# Patient Record
Sex: MALE | Race: WHITE | NOT HISPANIC OR LATINO | Employment: OTHER
[De-identification: names, ages, dates, MRNs, and addresses within clinical notes are randomized per-mention and may not be internally consistent; named-entity substitution may affect disease eponyms.]

---

## 2018-04-09 ENCOUNTER — TRANSCRIBE ORDERS (OUTPATIENT)
Dept: SCHEDULING | Age: 63
End: 2018-04-09

## 2018-04-09 DIAGNOSIS — R06.02 SHORTNESS OF BREATH: Primary | ICD-10-CM

## 2018-04-13 ENCOUNTER — HOSPITAL ENCOUNTER (OUTPATIENT)
Dept: PULMONOLOGY | Facility: HOSPITAL | Age: 63
Discharge: HOME | End: 2018-04-13
Attending: INTERNAL MEDICINE
Payer: COMMERCIAL

## 2018-04-13 VITALS — WEIGHT: 161 LBS | BODY MASS INDEX: 24.4 KG/M2 | HEIGHT: 68 IN | OXYGEN SATURATION: 99 % | HEART RATE: 50 BPM

## 2018-04-13 PROCEDURE — 94729 DIFFUSING CAPACITY: CPT

## 2018-04-13 PROCEDURE — 94726 PLETHYSMOGRAPHY LUNG VOLUMES: CPT

## 2018-04-13 PROCEDURE — 94010 BREATHING CAPACITY TEST: CPT

## 2020-12-22 ENCOUNTER — APPOINTMENT (EMERGENCY)
Dept: RADIOLOGY | Facility: HOSPITAL | Age: 65
DRG: 177 | End: 2020-12-22
Attending: EMERGENCY MEDICINE
Payer: MEDICARE

## 2020-12-22 ENCOUNTER — HOSPITAL ENCOUNTER (EMERGENCY)
Facility: HOSPITAL | Age: 65
Discharge: HOME | DRG: 177 | End: 2020-12-22
Attending: EMERGENCY MEDICINE
Payer: MEDICARE

## 2020-12-22 VITALS
HEART RATE: 74 BPM | TEMPERATURE: 100.4 F | DIASTOLIC BLOOD PRESSURE: 74 MMHG | OXYGEN SATURATION: 97 % | RESPIRATION RATE: 18 BRPM | SYSTOLIC BLOOD PRESSURE: 131 MMHG

## 2020-12-22 DIAGNOSIS — J12.82 PNEUMONIA DUE TO COVID-19 VIRUS: Primary | ICD-10-CM

## 2020-12-22 DIAGNOSIS — U07.1 PNEUMONIA DUE TO COVID-19 VIRUS: Primary | ICD-10-CM

## 2020-12-22 LAB
ALBUMIN SERPL-MCNC: 3.9 G/DL (ref 3.4–5)
ALP SERPL-CCNC: 44 IU/L (ref 35–126)
ALT SERPL-CCNC: 24 IU/L (ref 16–63)
ANION GAP SERPL CALC-SCNC: 11 MEQ/L (ref 3–15)
APTT PPP: 32 SEC (ref 23–35)
AST SERPL-CCNC: 38 IU/L (ref 15–41)
ATRIAL RATE: 60
BASOPHILS # BLD: 0 K/UL (ref 0.01–0.1)
BASOPHILS NFR BLD: 0 %
BILIRUB SERPL-MCNC: 0.8 MG/DL (ref 0.3–1.2)
BUN SERPL-MCNC: 16 MG/DL (ref 8–20)
CALCIUM SERPL-MCNC: 8.3 MG/DL (ref 8.9–10.3)
CHLORIDE SERPL-SCNC: 97 MEQ/L (ref 98–109)
CO2 SERPL-SCNC: 24 MEQ/L (ref 22–32)
CREAT SERPL-MCNC: 0.8 MG/DL (ref 0.8–1.3)
DIFFERENTIAL METHOD BLD: ABNORMAL
EOSINOPHIL # BLD: 0 K/UL (ref 0.04–0.54)
EOSINOPHIL NFR BLD: 0 %
ERYTHROCYTE [DISTWIDTH] IN BLOOD BY AUTOMATED COUNT: 12.4 % (ref 11.6–14.4)
GFR SERPL CREATININE-BSD FRML MDRD: >60 ML/MIN/1.73M*2
GLUCOSE SERPL-MCNC: 97 MG/DL (ref 70–99)
HCT VFR BLDCO AUTO: 38.7 % (ref 40.1–51)
HGB BLD-MCNC: 13.4 G/DL (ref 13.7–17.5)
INR PPP: 1.1 INR
LYMPHOCYTES # BLD: 0.69 K/UL (ref 1.2–3.5)
LYMPHOCYTES NFR BLD: 28 %
MCH RBC QN AUTO: 30.2 PG (ref 28–33.2)
MCHC RBC AUTO-ENTMCNC: 34.6 G/DL (ref 32.2–36.5)
MCV RBC AUTO: 87.4 FL (ref 83–98)
METAMYELOCYTES # BLD MANUAL: 0.02 K/UL
METAMYELOCYTES NFR BLD MANUAL: 1 %
MONOCYTES # BLD: 0.29 K/UL (ref 0.3–1)
MONOCYTES NFR BLD: 12 %
NEUTS BAND # BLD: 0.05 K/UL (ref 0–0.53)
NEUTS BAND # BLD: 1.4 K/UL (ref 1.7–7)
NEUTS BAND NFR BLD: 2 %
NEUTS SEG NFR BLD: 57 %
P AXIS: 10
PDW BLD AUTO: 11.4 FL (ref 9.4–12.4)
PLAT MORPH BLD: NORMAL
PLATELET # BLD AUTO: 104 K/UL (ref 150–350)
PLATELET # BLD EST: ABNORMAL 10*3/UL
PLATELET CLUMP BLD QL SMEAR: PRESENT
POTASSIUM SERPL-SCNC: 3.7 MEQ/L (ref 3.6–5.1)
PR INTERVAL: 172
PROT SERPL-MCNC: 7.7 G/DL (ref 6–8.2)
PROTHROMBIN TIME: 14.3 SEC (ref 12.2–14.5)
QRS DURATION: 102
QT INTERVAL: 382
QTC CALCULATION(BAZETT): 382
R AXIS: 30
RBC # BLD AUTO: 4.43 M/UL (ref 4.5–5.8)
RBC MORPH BLD: NORMAL
SODIUM SERPL-SCNC: 132 MEQ/L (ref 136–144)
T WAVE AXIS: 28
TROPONIN I SERPL-MCNC: <0.02 NG/ML
VENTRICULAR RATE: 60
WBC # BLD AUTO: 2.45 K/UL (ref 3.8–10.5)

## 2020-12-22 PROCEDURE — 25800000 HC PHARMACY IV SOLUTIONS: Performed by: PHYSICIAN ASSISTANT

## 2020-12-22 PROCEDURE — 80053 COMPREHEN METABOLIC PANEL: CPT | Performed by: PHYSICIAN ASSISTANT

## 2020-12-22 PROCEDURE — 36415 COLL VENOUS BLD VENIPUNCTURE: CPT | Performed by: PHYSICIAN ASSISTANT

## 2020-12-22 PROCEDURE — 93005 ELECTROCARDIOGRAM TRACING: CPT | Performed by: PHYSICIAN ASSISTANT

## 2020-12-22 PROCEDURE — 85730 THROMBOPLASTIN TIME PARTIAL: CPT | Performed by: PHYSICIAN ASSISTANT

## 2020-12-22 PROCEDURE — 84484 ASSAY OF TROPONIN QUANT: CPT | Performed by: PHYSICIAN ASSISTANT

## 2020-12-22 PROCEDURE — 99284 EMERGENCY DEPT VISIT MOD MDM: CPT | Mod: 59

## 2020-12-22 PROCEDURE — 71260 CT THORAX DX C+: CPT | Mod: MG

## 2020-12-22 PROCEDURE — 85610 PROTHROMBIN TIME: CPT | Performed by: PHYSICIAN ASSISTANT

## 2020-12-22 PROCEDURE — 63700000 HC SELF-ADMINISTRABLE DRUG: Performed by: PHYSICIAN ASSISTANT

## 2020-12-22 PROCEDURE — 71045 X-RAY EXAM CHEST 1 VIEW: CPT

## 2020-12-22 PROCEDURE — 85025 COMPLETE CBC W/AUTO DIFF WBC: CPT | Performed by: PHYSICIAN ASSISTANT

## 2020-12-22 PROCEDURE — 96361 HYDRATE IV INFUSION ADD-ON: CPT | Mod: 59

## 2020-12-22 PROCEDURE — 63600105 HC IODINE BASED CONTRAST: Performed by: PHYSICIAN ASSISTANT

## 2020-12-22 PROCEDURE — 96360 HYDRATION IV INFUSION INIT: CPT

## 2020-12-22 RX ORDER — AZITHROMYCIN 250 MG/1
TABLET, FILM COATED ORAL
Qty: 6 TABLET | Refills: 0 | Status: SHIPPED | OUTPATIENT
Start: 2020-12-22 | End: 2021-01-12 | Stop reason: HOSPADM

## 2020-12-22 RX ORDER — SODIUM CHLORIDE 9 MG/ML
100 INJECTION, SOLUTION INTRAVENOUS CONTINUOUS
Status: DISCONTINUED | OUTPATIENT
Start: 2020-12-22 | End: 2020-12-22 | Stop reason: HOSPADM

## 2020-12-22 RX ORDER — ACETAMINOPHEN 325 MG/1
650 TABLET ORAL ONCE
Status: COMPLETED | OUTPATIENT
Start: 2020-12-22 | End: 2020-12-22

## 2020-12-22 RX ADMIN — IOHEXOL 100 ML: 350 INJECTION, SOLUTION INTRAVENOUS at 14:31

## 2020-12-22 RX ADMIN — SODIUM CHLORIDE 100 ML/HR: 9 INJECTION, SOLUTION INTRAVENOUS at 11:25

## 2020-12-22 RX ADMIN — ACETAMINOPHEN 650 MG: 325 TABLET ORAL at 13:21

## 2020-12-22 ASSESSMENT — ENCOUNTER SYMPTOMS
FATIGUE: 0
JOINT SWELLING: 0
ABDOMINAL PAIN: 0
DIZZINESS: 0
COUGH: 1
TREMORS: 0
VOMITING: 0
CHILLS: 0
EYE PAIN: 0
DIARRHEA: 0
EYE REDNESS: 0
WHEEZING: 0
CONSTIPATION: 0
PHOTOPHOBIA: 0
SORE THROAT: 0
CHEST TIGHTNESS: 0
VOICE CHANGE: 0
ABDOMINAL DISTENTION: 0
NAUSEA: 1
MYALGIAS: 0
CONFUSION: 0
SINUS PAIN: 0
NECK STIFFNESS: 0
APPETITE CHANGE: 0
BLOOD IN STOOL: 0
BRUISES/BLEEDS EASILY: 0
STRIDOR: 0
NECK PAIN: 0
NUMBNESS: 0
BACK PAIN: 0
SHORTNESS OF BREATH: 0
LIGHT-HEADEDNESS: 0
COLOR CHANGE: 0
RHINORRHEA: 0
PALPITATIONS: 0
DECREASED CONCENTRATION: 0
WEAKNESS: 0
HEADACHES: 0
DIAPHORESIS: 0
ARTHRALGIAS: 0
FEVER: 1
TROUBLE SWALLOWING: 0
SINUS PRESSURE: 0

## 2020-12-22 NOTE — DISCHARGE INSTRUCTIONS
Zithromax as prescribed   Increase fluids, rest  OTC tylenol /motrin as needed   Follow up with PCP in 1-2 days   Return to the ED for worsening of symptoms or any problems or concerns.  It is very important to follow up with your healthcare provider for re-evaluation.

## 2020-12-22 NOTE — ED PROVIDER NOTES
"HPI     Chief Complaint   Patient presents with   • COVID Related       Patient is a 66 y/o male with PMH HLD who presents c/o coughing blood and L lung pain x 1 day  Patient states he was dx with COVID 8 days ago  Patient states he has had dry cough, nausea, fever   Patient states this morning he noticed increased pain \"in my left lung\"   Notes pain was worse with coughing and blowing his nose   Patient states \"I also coughed up a few streaks of blood\" which prompted his arrival  Patient states \"I am worried I have pneumonia\"   Patient denies vomiting, diarrhea, SOB, DOMINGUEZ  Patient denies any other complaints or concerns at this time            Patient History     Past Medical History:   Diagnosis Date   • Lipid disorder        History reviewed. No pertinent surgical history.    History reviewed. No pertinent family history.    Social History     Tobacco Use   • Smoking status: Former Smoker   • Smokeless tobacco: Never Used   Substance Use Topics   • Alcohol use: Yes   • Drug use: Not on file       Systems Reviewed from Nursing Triage:          Review of Systems     Review of Systems   Constitutional: Positive for fever. Negative for appetite change, chills, diaphoresis and fatigue.   HENT: Negative for congestion, ear pain, postnasal drip, rhinorrhea, sinus pressure, sinus pain, sore throat, tinnitus, trouble swallowing and voice change.    Eyes: Negative for photophobia, pain, redness and visual disturbance.   Respiratory: Positive for cough. Negative for chest tightness, shortness of breath, wheezing and stridor.    Cardiovascular: Positive for chest pain. Negative for palpitations and leg swelling.   Gastrointestinal: Positive for nausea. Negative for abdominal distention, abdominal pain, blood in stool, constipation, diarrhea and vomiting.   Musculoskeletal: Negative for arthralgias, back pain, gait problem, joint swelling, myalgias, neck pain and neck stiffness.   Skin: Negative for color change and rash. "   Neurological: Negative for dizziness, tremors, syncope, weakness, light-headedness, numbness and headaches.   Hematological: Does not bruise/bleed easily.   Psychiatric/Behavioral: Negative for confusion and decreased concentration.        Physical Exam     ED Vitals    Date/Time Temp Pulse Resp BP SpO2 [facility]   12/22/20 1531 -- 74 20 -- 95 % PR[facility]   12/22/20 1322 38 °C (100.4 °F) 71 20 131/74 97 % PR[facility]   12/22/20 0946 37.9 °C (100.3 °F) 82 18 121/63 98 % MP          Pulse Ox %: 98 % (12/22/20 1147)  Pulse Ox Interpretation: Normal (12/22/20 1147)  Heart Rate: 60 (12/22/20 1147)  Rhythm Strip Interpretation: Normal Sinus Rhythm (12/22/20 1147)                                       Physical Exam  Vitals signs and nursing note reviewed.   Constitutional:       General: He is not in acute distress.     Appearance: Normal appearance. He is normal weight. He is not ill-appearing, toxic-appearing or diaphoretic.   HENT:      Head: Normocephalic and atraumatic.      Nose: Nose normal.      Mouth/Throat:      Mouth: Mucous membranes are moist.      Pharynx: Oropharynx is clear.   Eyes:      Extraocular Movements: Extraocular movements intact.      Conjunctiva/sclera: Conjunctivae normal.      Pupils: Pupils are equal, round, and reactive to light.   Neck:      Musculoskeletal: Normal range of motion. No neck rigidity or muscular tenderness.   Cardiovascular:      Rate and Rhythm: Normal rate and regular rhythm.      Pulses: Normal pulses.           Radial pulses are 2+ on the right side and 2+ on the left side.        Posterior tibial pulses are 2+ on the right side and 2+ on the left side.      Heart sounds: Normal heart sounds.   Pulmonary:      Effort: Pulmonary effort is normal. No respiratory distress.      Breath sounds: Normal breath sounds. No stridor. No wheezing, rhonchi or rales.   Chest:      Chest wall: No tenderness.   Abdominal:      General: Abdomen is flat. Bowel sounds are normal. There is no distension.       Tenderness: There is no abdominal tenderness. There is no guarding.   Musculoskeletal: Normal range of motion.      Right lower leg: No edema.      Left lower leg: No edema.   Lymphadenopathy:      Cervical: No cervical adenopathy.   Skin:     General: Skin is warm and dry.      Capillary Refill: Capillary refill takes less than 2 seconds.      Coloration: Skin is not pale.   Neurological:      General: No focal deficit present.      Mental Status: He is alert and oriented to person, place, and time.      Cranial Nerves: No cranial nerve deficit.      Motor: No weakness.      Gait: Gait normal.   Psychiatric:         Mood and Affect: Mood normal.         Thought Content: Thought content normal.         Judgment: Judgment normal.              Procedures    Results     Procedure Component Value Units Date/Time    CBC and differential [[id number]]  (Abnormal) Collected: 12/22/20 1124    Specimen: Blood, Venous Updated: 12/22/20 1209     WBC 2.45 K/uL      Comment: ALL RESULTS HAVE BEEN CHECKED. This result has been called to BAILEY MICHAEL by Thania Strauss on 12 22 20 at 11:47, and has been read back.         RBC 4.43 M/uL      Hemoglobin 13.4 g/dL      Hematocrit 38.7 %      MCV 87.4 fL      MCH 30.2 pg      MCHC 34.6 g/dL      RDW 12.4 %      Platelets 104 K/uL      Comment: RESULTS CHECKED. SPECIMEN QUALITY CHECKED        MPV 11.4 fL      Differential Type Manu     Neutrophils 57 %      Lymphocytes 28 %      Monocytes 12 %      Eosinophils 0 %      Basophils 0 %      Bands 2 %      Metamyelocytes 1 %      Neutrophils, Absolute 1.40 K/uL      Lymphocytes, Absolute 0.69 K/uL      Monocytes, Absolute 0.29 K/uL      Eosinophils, Absolute 0.00 K/uL      Basophils, Absolute 0.00 K/uL      Bands, Absolute 0.05 K/uL      Metamyelocytes, Absolute 0.02 K/uL      RBC Morphology Normal     PLT Morphology Normal     Platelet Estimate Decreased (51,000-149,000)     Clumped Platelets Present    Troponin I [[id number]]  (Normal)  Collected: 12/22/20 1124    Specimen: Blood, Venous Updated: 12/22/20 1209     Troponin I <0.02 ng/mL     Protime-INR [[id number]]  (Normal) Collected: 12/22/20 1124    Specimen: Blood, Venous Updated: 12/22/20 1208     PT 14.3 sec      INR 1.1 INR      Comment: INR has no defined significance when PT is within Reference Range.       APTT [[id number]]  (Normal) Collected: 12/22/20 1124    Specimen: Blood, Venous Updated: 12/22/20 1208     PTT 32 sec     Comprehensive metabolic panel [[id number]]  (Abnormal) Collected: 12/22/20 1124    Specimen: Blood, Venous Updated: 12/22/20 1204     Sodium 132 mEQ/L      Potassium 3.7 mEQ/L      Comment: Results obtained on plasma. Plasma Potassium values may be up to 0.4 mEQ/L less than serum values. The differences may be greater for patients with high platelet or white cell counts.        Chloride 97 mEQ/L      CO2 24 mEQ/L      BUN 16 mg/dL      Creatinine 0.8 mg/dL      Glucose 97 mg/dL      Calcium 8.3 mg/dL      AST (SGOT) 38 IU/L      ALT (SGPT) 24 IU/L      Alkaline Phosphatase 44 IU/L      Total Protein 7.7 g/dL      Comment: Test performed on plasma which typically contains approximately 0.4 g/dL more protein than serum.        Albumin 3.9 g/dL      Bilirubin, Total 0.8 mg/dL      eGFR >60.0 mL/min/1.73m*2      Anion Gap 11 mEQ/L           Imaging Results          CT CHEST PULMONARY EMBOLISM WITH IV CONTRAST (Final result)  Result time 12/22/20 15:13:40    Final result                 Impression:    IMPRESSION:    1.  No CT evidence of pulmonary embolus.  2.  Findings in the chest consistent with known history of Covid pneumonia.             Narrative:    CLINICAL HISTORY: Shortness of breath  COVID +, coughed blood, pain in L lung with cough   .    TECHNIQUE: CT chest with contrast.  Pulmonary embolus protocol.  100 mL  Omnipaque 350.    CT DOSE:  One or more dose reduction techniques (e.g. automated exposure  control, adjustment of the mA and/or kV according to patient  size, use of  iterative reconstruction technique) utilized for this examination.    COMPARISON: None.    COMMENT:    ---------------------------------------------------------------------------  Airway/Lungs:    Patency of the trachea and central airways.  There are groundglass opacities  noted predominantly in the left upper lobe peripherally and left lower lobe at  the costophrenic angle.  This is noted to a lesser degree in the right middle  lobe and right lower lobe.  Findings are consistent with known Covid pneumonia.  No pleural effusions on either side.    ---------------------------------------------------------------------------  Mediastinum:    No CT evidence of pulmonary embolus.    Heart size is normal.  Coronary artery calcifications.  Aorta and great vessels  are unremarkable.  Prominent reactive mediastinal lymph nodes are present.    ---------------------------------------------------------------------------  Neck, Chest wall, and Osseous structures:    Lower neck is unremarkable.  Chest wall soft tissues are unremarkable.  Osseous  structures are intact.    --------------------------------------------------------------------------  Upper abdomen:    Upper abdominal soft tissues are unremarkable.    ---------------------------------------------------------------------------                                 X-RAY CHEST 1 VIEW (Final result)  Result time 12/22/20 11:59:41    Final result                 Impression:    IMPRESSION:  Patchy bilateral space opacity predominantly in the lower lobes.                 Narrative:      CLINICAL HISTORY:  Hemoptysis.  COVID-19    COMMENT:    Views: Single frontal portable..    Comparison date: none.    The heart and mediastinal contours are intact with top normal heart size..  The  trachea is midline.  Patchy airspace opacity noted predominantly in the lower  lobes..  There  are no pleural effusions.  The osseous structures are intact  with degenerative  change.                                    ECG 12 lead                     ED Course & MDM     MDM         ED Course as of Dec 22 1537   Tue Dec 22, 2020   1523 Labs and CT reviewed     [CL]   1537 Ambulatory pulse ox 95% on RA    [CL]   1537 Patient given strict return precautions. Advised close f/u with PCP. Patient expressed understanding and agreement with tx plan. All questions answered. Patient ambulating independently, tolerating PO, pain improved. VS stable.     [CL]      ED Course User Index  [CL] Teri Saravia PA C         Clinical Impressions as of Dec 22 1537   Pneumonia due to COVID-19 virus        Teri Saravia PA C  12/22/20 1537

## 2020-12-22 NOTE — ED ATTESTATION NOTE
The patient was evaluated and managed by the physician assistant / nurse practitioner.     Yifan Conner MD  12/22/20 2017

## 2020-12-24 ENCOUNTER — APPOINTMENT (EMERGENCY)
Dept: RADIOLOGY | Facility: HOSPITAL | Age: 65
DRG: 177 | End: 2020-12-24
Attending: EMERGENCY MEDICINE
Payer: MEDICARE

## 2020-12-24 ENCOUNTER — HOSPITAL ENCOUNTER (INPATIENT)
Facility: HOSPITAL | Age: 65
LOS: 19 days | Discharge: HOME HEALTH | DRG: 177 | End: 2021-01-12
Attending: EMERGENCY MEDICINE | Admitting: HOSPITALIST
Payer: MEDICARE

## 2020-12-24 DIAGNOSIS — U07.1 PNEUMONIA DUE TO COVID-19 VIRUS: ICD-10-CM

## 2020-12-24 DIAGNOSIS — R09.02 HYPOXIA: ICD-10-CM

## 2020-12-24 DIAGNOSIS — J12.82 PNEUMONIA DUE TO COVID-19 VIRUS: ICD-10-CM

## 2020-12-24 DIAGNOSIS — R06.02 SHORTNESS OF BREATH: ICD-10-CM

## 2020-12-24 DIAGNOSIS — I26.94 MULTIPLE SUBSEGMENTAL PULMONARY EMBOLI WITHOUT ACUTE COR PULMONALE (CMS/HCC): Primary | ICD-10-CM

## 2020-12-24 PROBLEM — D61.818 PANCYTOPENIA (CMS/HCC): Status: ACTIVE | Noted: 2020-12-24

## 2020-12-24 PROBLEM — E87.1 ACUTE HYPONATREMIA: Status: ACTIVE | Noted: 2020-12-24

## 2020-12-24 PROBLEM — I25.10 CORONARY ARTERY DISEASE INVOLVING NATIVE CORONARY ARTERY OF NATIVE HEART WITHOUT ANGINA PECTORIS: Chronic | Status: ACTIVE | Noted: 2020-12-24

## 2020-12-24 LAB
ALBUMIN SERPL-MCNC: 3.5 G/DL (ref 3.4–5)
ALP SERPL-CCNC: 40 IU/L (ref 35–126)
ALT SERPL-CCNC: 53 IU/L (ref 16–63)
ANION GAP SERPL CALC-SCNC: 11 MEQ/L (ref 3–15)
AST SERPL-CCNC: 85 IU/L (ref 15–41)
BACTERIA URNS QL MICRO: ABNORMAL /HPF
BASOPHILS # BLD: 0 K/UL (ref 0.01–0.1)
BASOPHILS NFR BLD: 0 %
BILIRUB SERPL-MCNC: 1 MG/DL (ref 0.3–1.2)
BILIRUB UR QL STRIP.AUTO: NEGATIVE MG/DL
BUN SERPL-MCNC: 21 MG/DL (ref 8–20)
CALCIUM SERPL-MCNC: 8.3 MG/DL (ref 8.9–10.3)
CHLORIDE SERPL-SCNC: 91 MEQ/L (ref 98–109)
CLARITY UR REFRACT.AUTO: CLEAR
CO2 SERPL-SCNC: 24 MEQ/L (ref 22–32)
COLOR UR AUTO: YELLOW
CREAT SERPL-MCNC: 0.9 MG/DL (ref 0.8–1.3)
DIFFERENTIAL METHOD BLD: ABNORMAL
EOSINOPHIL # BLD: 0 K/UL (ref 0.04–0.54)
EOSINOPHIL NFR BLD: 0 %
ERYTHROCYTE [DISTWIDTH] IN BLOOD BY AUTOMATED COUNT: 12.5 % (ref 11.6–14.4)
GFR SERPL CREATININE-BSD FRML MDRD: >60 ML/MIN/1.73M*2
GLUCOSE SERPL-MCNC: 117 MG/DL (ref 70–99)
GLUCOSE UR STRIP.AUTO-MCNC: NEGATIVE MG/DL
HCT VFR BLDCO AUTO: 34.6 % (ref 40.1–51)
HGB BLD-MCNC: 12.2 G/DL (ref 13.7–17.5)
HGB UR QL STRIP.AUTO: NEGATIVE
HYALINE CASTS #/AREA URNS LPF: ABNORMAL /LPF
KETONES UR STRIP.AUTO-MCNC: 1 MG/DL
LACTATE SERPL-SCNC: 1.2 MMOL/L (ref 0.4–2)
LEUKOCYTE ESTERASE UR QL STRIP.AUTO: NEGATIVE
LYMPHOCYTES # BLD: 0.53 K/UL (ref 1.2–3.5)
LYMPHOCYTES NFR BLD: 20 %
MCH RBC QN AUTO: 30.6 PG (ref 28–33.2)
MCHC RBC AUTO-ENTMCNC: 35.3 G/DL (ref 32.2–36.5)
MCV RBC AUTO: 86.7 FL (ref 83–98)
MONOCYTES # BLD: 0.27 K/UL (ref 0.3–1)
MONOCYTES NFR BLD: 10 %
NEUTS BAND # BLD: 0.05 K/UL (ref 0–0.53)
NEUTS BAND # BLD: 1.78 K/UL (ref 1.7–7)
NEUTS BAND NFR BLD: 2 %
NEUTS SEG NFR BLD: 67 %
NITRITE UR QL STRIP.AUTO: NEGATIVE
PDW BLD AUTO: 11 FL (ref 9.4–12.4)
PH UR STRIP.AUTO: 6.5 [PH]
PLAT MORPH BLD: NORMAL
PLATELET # BLD AUTO: 121 K/UL (ref 150–350)
PLATELET # BLD EST: ABNORMAL 10*3/UL
POTASSIUM SERPL-SCNC: 3.5 MEQ/L (ref 3.6–5.1)
PROT SERPL-MCNC: 7.2 G/DL (ref 6–8.2)
PROT UR QL STRIP.AUTO: 2
RBC # BLD AUTO: 3.99 M/UL (ref 4.5–5.8)
RBC #/AREA URNS HPF: ABNORMAL /HPF
RBC MORPH BLD: NORMAL
SODIUM SERPL-SCNC: 126 MEQ/L (ref 136–144)
SP GR UR REFRACT.AUTO: 1.02
SQUAMOUS URNS QL MICRO: 1 /HPF
TROPONIN I SERPL-MCNC: 0.02 NG/ML
UROBILINOGEN UR STRIP-ACNC: 1 EU/DL
VARIANT LYMPHS # BLD MANUAL: 0.03 K/UL
VARIANT LYMPHS NFR BLD: 1 %
WBC # BLD AUTO: 2.65 K/UL (ref 3.8–10.5)
WBC #/AREA URNS HPF: ABNORMAL /HPF

## 2020-12-24 PROCEDURE — 84484 ASSAY OF TROPONIN QUANT: CPT | Performed by: EMERGENCY MEDICINE

## 2020-12-24 PROCEDURE — 81003 URINALYSIS AUTO W/O SCOPE: CPT | Performed by: EMERGENCY MEDICINE

## 2020-12-24 PROCEDURE — 63700000 HC SELF-ADMINISTRABLE DRUG: Performed by: EMERGENCY MEDICINE

## 2020-12-24 PROCEDURE — 87040 BLOOD CULTURE FOR BACTERIA: CPT | Performed by: EMERGENCY MEDICINE

## 2020-12-24 PROCEDURE — 85025 COMPLETE CBC W/AUTO DIFF WBC: CPT | Performed by: EMERGENCY MEDICINE

## 2020-12-24 PROCEDURE — 99223 1ST HOSP IP/OBS HIGH 75: CPT | Mod: CR | Performed by: HOSPITALIST

## 2020-12-24 PROCEDURE — 93005 ELECTROCARDIOGRAM TRACING: CPT | Performed by: EMERGENCY MEDICINE

## 2020-12-24 PROCEDURE — 63600000 HC DRUGS/DETAIL CODE: Performed by: EMERGENCY MEDICINE

## 2020-12-24 PROCEDURE — 99284 EMERGENCY DEPT VISIT MOD MDM: CPT | Mod: 25

## 2020-12-24 PROCEDURE — 80053 COMPREHEN METABOLIC PANEL: CPT | Performed by: EMERGENCY MEDICINE

## 2020-12-24 PROCEDURE — 71045 X-RAY EXAM CHEST 1 VIEW: CPT

## 2020-12-24 PROCEDURE — 12000000 HC ROOM AND CARE MED/SURG

## 2020-12-24 PROCEDURE — 36415 COLL VENOUS BLD VENIPUNCTURE: CPT | Performed by: EMERGENCY MEDICINE

## 2020-12-24 PROCEDURE — 1123F ACP DISCUSS/DSCN MKR DOCD: CPT | Mod: CR | Performed by: HOSPITALIST

## 2020-12-24 PROCEDURE — 83605 ASSAY OF LACTIC ACID: CPT | Performed by: EMERGENCY MEDICINE

## 2020-12-24 RX ORDER — IBUPROFEN 200 MG
16-32 TABLET ORAL AS NEEDED
Status: DISCONTINUED | OUTPATIENT
Start: 2020-12-24 | End: 2021-01-12 | Stop reason: HOSPADM

## 2020-12-24 RX ORDER — DEXTROSE 50 % IN WATER (D50W) INTRAVENOUS SYRINGE
25 AS NEEDED
Status: DISCONTINUED | OUTPATIENT
Start: 2020-12-24 | End: 2021-01-12 | Stop reason: HOSPADM

## 2020-12-24 RX ORDER — ACETAMINOPHEN 325 MG/1
650 TABLET ORAL ONCE
Status: COMPLETED | OUTPATIENT
Start: 2020-12-24 | End: 2020-12-24

## 2020-12-24 RX ORDER — ALBUTEROL SULFATE 90 UG/1
2 INHALANT RESPIRATORY (INHALATION) EVERY 6 HOURS PRN
Status: DISCONTINUED | OUTPATIENT
Start: 2020-12-24 | End: 2021-01-12 | Stop reason: HOSPADM

## 2020-12-24 RX ORDER — SODIUM CHLORIDE 9 MG/ML
INJECTION, SOLUTION INTRAVENOUS CONTINUOUS
Status: DISCONTINUED | OUTPATIENT
Start: 2020-12-25 | End: 2020-12-25

## 2020-12-24 RX ORDER — DEXAMETHASONE SODIUM PHOSPHATE 4 MG/ML
6 INJECTION, SOLUTION INTRA-ARTICULAR; INTRALESIONAL; INTRAMUSCULAR; INTRAVENOUS; SOFT TISSUE ONCE
Status: COMPLETED | OUTPATIENT
Start: 2020-12-24 | End: 2020-12-24

## 2020-12-24 RX ORDER — DEXTROSE 40 %
15-30 GEL (GRAM) ORAL AS NEEDED
Status: DISCONTINUED | OUTPATIENT
Start: 2020-12-24 | End: 2021-01-12 | Stop reason: HOSPADM

## 2020-12-24 RX ADMIN — DEXAMETHASONE SODIUM PHOSPHATE 6 MG: 4 INJECTION, SOLUTION INTRA-ARTICULAR; INTRALESIONAL; INTRAMUSCULAR; INTRAVENOUS; SOFT TISSUE at 20:51

## 2020-12-24 RX ADMIN — ACETAMINOPHEN 650 MG: 325 TABLET ORAL at 20:50

## 2020-12-24 ASSESSMENT — ENCOUNTER SYMPTOMS
EYE PAIN: 0
WHEEZING: 0
VOMITING: 0
CLAUDICATION: 0
BACK PAIN: 0
SEIZURES: 0
ABDOMINAL PAIN: 0
COLOR CHANGE: 0
DIAPHORESIS: 0
DIARRHEA: 1
FREQUENCY: 0
ARTHRALGIAS: 0
MYALGIAS: 1
SORE THROAT: 0
PALPITATIONS: 0
COUGH: 1
SPUTUM PRODUCTION: 0
CHILLS: 1
HEMATURIA: 0
FEVER: 1
SYNCOPE: 0
NAUSEA: 0
SHORTNESS OF BREATH: 1
DYSURIA: 0

## 2020-12-25 PROBLEM — J96.01 ACUTE RESPIRATORY FAILURE WITH HYPOXIA (CMS/HCC): Status: ACTIVE | Noted: 2020-12-25

## 2020-12-25 LAB
ALBUMIN SERPL-MCNC: 3 G/DL (ref 3.4–5)
ALP SERPL-CCNC: 36 IU/L (ref 35–126)
ALT SERPL-CCNC: 50 IU/L (ref 16–63)
ANION GAP SERPL CALC-SCNC: 10 MEQ/L (ref 3–15)
ANION GAP SERPL CALC-SCNC: 11 MEQ/L (ref 3–15)
ANION GAP SERPL CALC-SCNC: 11 MEQ/L (ref 3–15)
APTT PPP: 37 SEC (ref 23–35)
AST SERPL-CCNC: 80 IU/L (ref 15–41)
ATRIAL RATE: 76
BASOPHILS # BLD: 0 K/UL (ref 0.01–0.1)
BASOPHILS NFR BLD: 0 %
BILIRUB SERPL-MCNC: 1.1 MG/DL (ref 0.3–1.2)
BUN SERPL-MCNC: 17 MG/DL (ref 8–20)
CALCIUM SERPL-MCNC: 8 MG/DL (ref 8.9–10.3)
CALCIUM SERPL-MCNC: 8 MG/DL (ref 8.9–10.3)
CALCIUM SERPL-MCNC: 8.1 MG/DL (ref 8.9–10.3)
CHLORIDE SERPL-SCNC: 93 MEQ/L (ref 98–109)
CHLORIDE SERPL-SCNC: 98 MEQ/L (ref 98–109)
CHLORIDE SERPL-SCNC: 98 MEQ/L (ref 98–109)
CK SERPL-CCNC: 326 U/L (ref 16–300)
CO2 SERPL-SCNC: 21 MEQ/L (ref 22–32)
CO2 SERPL-SCNC: 21 MEQ/L (ref 22–32)
CO2 SERPL-SCNC: 23 MEQ/L (ref 22–32)
CREAT SERPL-MCNC: 0.7 MG/DL (ref 0.8–1.3)
CREAT SERPL-MCNC: 0.7 MG/DL (ref 0.8–1.3)
CREAT SERPL-MCNC: 0.8 MG/DL (ref 0.8–1.3)
CRP SERPL-MCNC: 103.9 MG/L
D DIMER PPP IA.FEU-MCNC: 2.06 UG/ML FEU (ref 0–0.5)
DIFFERENTIAL METHOD BLD: ABNORMAL
EOSINOPHIL # BLD: 0 K/UL (ref 0.04–0.54)
EOSINOPHIL NFR BLD: 0 %
ERYTHROCYTE [DISTWIDTH] IN BLOOD BY AUTOMATED COUNT: 12.5 % (ref 11.6–14.4)
FERRITIN SERPL-MCNC: 2323 NG/ML (ref 24–250)
GFR SERPL CREATININE-BSD FRML MDRD: >60 ML/MIN/1.73M*2
GIANT PLATELETS BLD QL SMEAR: ABNORMAL
GLUCOSE SERPL-MCNC: 122 MG/DL (ref 70–99)
GLUCOSE SERPL-MCNC: 126 MG/DL (ref 70–99)
GLUCOSE SERPL-MCNC: 126 MG/DL (ref 70–99)
HCT VFR BLDCO AUTO: 32.9 % (ref 40.1–51)
HGB BLD-MCNC: 11.6 G/DL (ref 13.7–17.5)
INR PPP: 1.1 INR
LDH SERPL L TO P-CCNC: 414 IU/L (ref 98–192)
LYMPHOCYTES # BLD: 0.43 K/UL (ref 1.2–3.5)
LYMPHOCYTES NFR BLD: 30 %
MAGNESIUM SERPL-MCNC: 2 MG/DL (ref 1.8–2.5)
MCH RBC QN AUTO: 30.4 PG (ref 28–33.2)
MCHC RBC AUTO-ENTMCNC: 35.3 G/DL (ref 32.2–36.5)
MCV RBC AUTO: 86.1 FL (ref 83–98)
MONOCYTES # BLD: 0.09 K/UL (ref 0.3–1)
MONOCYTES NFR BLD: 6 %
NEUTS BAND # BLD: 0.09 K/UL (ref 0–0.53)
NEUTS BAND # BLD: 0.79 K/UL (ref 1.7–7)
NEUTS BAND NFR BLD: 6 %
NEUTS SEG NFR BLD: 55 %
OVALOCYTES BLD QL SMEAR: ABNORMAL
P AXIS: -19
PATH REV BLD -IMP: NORMAL
PDW BLD AUTO: 12.1 FL (ref 9.4–12.4)
PHOSPHATE SERPL-MCNC: 2.6 MG/DL (ref 2.4–4.7)
PLATELET # BLD AUTO: 88 K/UL (ref 150–350)
PLATELET # BLD EST: ABNORMAL 10*3/UL
PLATELET CLUMP BLD QL SMEAR: PRESENT
POTASSIUM SERPL-SCNC: 3.4 MEQ/L (ref 3.6–5.1)
POTASSIUM SERPL-SCNC: 3.6 MEQ/L (ref 3.6–5.1)
POTASSIUM SERPL-SCNC: 3.6 MEQ/L (ref 3.6–5.1)
PR INTERVAL: 152
PROT SERPL-MCNC: 6.1 G/DL (ref 6–8.2)
PROTHROMBIN TIME: 14 SEC (ref 12.2–14.5)
QRS DURATION: 90
QT INTERVAL: 374
QTC CALCULATION(BAZETT): 420
R AXIS: 46
RBC # BLD AUTO: 3.82 M/UL (ref 4.5–5.8)
SODIUM SERPL-SCNC: 126 MEQ/L (ref 136–144)
SODIUM SERPL-SCNC: 130 MEQ/L (ref 136–144)
SODIUM SERPL-SCNC: 130 MEQ/L (ref 136–144)
T WAVE AXIS: 8
VARIANT LYMPHS # BLD MANUAL: 0.04 K/UL
VARIANT LYMPHS NFR BLD: 3 %
VENTRICULAR RATE: 76
WBC # BLD AUTO: 1.44 K/UL (ref 3.8–10.5)

## 2020-12-25 PROCEDURE — 80048 BASIC METABOLIC PNL TOTAL CA: CPT | Performed by: HOSPITALIST

## 2020-12-25 PROCEDURE — 85379 FIBRIN DEGRADATION QUANT: CPT | Performed by: HOSPITALIST

## 2020-12-25 PROCEDURE — 99233 SBSQ HOSP IP/OBS HIGH 50: CPT | Mod: CR | Performed by: INTERNAL MEDICINE

## 2020-12-25 PROCEDURE — 84100 ASSAY OF PHOSPHORUS: CPT | Performed by: HOSPITALIST

## 2020-12-25 PROCEDURE — 12000000 HC ROOM AND CARE MED/SURG

## 2020-12-25 PROCEDURE — 63700000 HC SELF-ADMINISTRABLE DRUG: Performed by: HOSPITALIST

## 2020-12-25 PROCEDURE — 36415 COLL VENOUS BLD VENIPUNCTURE: CPT | Performed by: HOSPITALIST

## 2020-12-25 PROCEDURE — 82728 ASSAY OF FERRITIN: CPT | Performed by: HOSPITALIST

## 2020-12-25 PROCEDURE — 86140 C-REACTIVE PROTEIN: CPT | Performed by: HOSPITALIST

## 2020-12-25 PROCEDURE — 82040 ASSAY OF SERUM ALBUMIN: CPT | Performed by: HOSPITALIST

## 2020-12-25 PROCEDURE — 85730 THROMBOPLASTIN TIME PARTIAL: CPT | Performed by: HOSPITALIST

## 2020-12-25 PROCEDURE — 82550 ASSAY OF CK (CPK): CPT | Performed by: HOSPITALIST

## 2020-12-25 PROCEDURE — 63600000 HC DRUGS/DETAIL CODE: Performed by: HOSPITALIST

## 2020-12-25 PROCEDURE — 83615 LACTATE (LD) (LDH) ENZYME: CPT | Performed by: HOSPITALIST

## 2020-12-25 PROCEDURE — 85025 COMPLETE CBC W/AUTO DIFF WBC: CPT | Performed by: HOSPITALIST

## 2020-12-25 PROCEDURE — 25800000 HC PHARMACY IV SOLUTIONS: Performed by: HOSPITALIST

## 2020-12-25 PROCEDURE — 85610 PROTHROMBIN TIME: CPT | Performed by: HOSPITALIST

## 2020-12-25 PROCEDURE — 83735 ASSAY OF MAGNESIUM: CPT | Performed by: HOSPITALIST

## 2020-12-25 RX ORDER — ASPIRIN 81 MG/1
81 TABLET ORAL DAILY
Status: DISCONTINUED | OUTPATIENT
Start: 2020-12-25 | End: 2021-01-12 | Stop reason: HOSPADM

## 2020-12-25 RX ORDER — ENOXAPARIN SODIUM 100 MG/ML
40 INJECTION SUBCUTANEOUS
Status: DISCONTINUED | OUTPATIENT
Start: 2020-12-25 | End: 2020-12-27

## 2020-12-25 RX ORDER — DEXTROSE 50 % IN WATER (D50W) INTRAVENOUS SYRINGE
25 AS NEEDED
Status: DISCONTINUED | OUTPATIENT
Start: 2020-12-25 | End: 2020-12-30 | Stop reason: SDUPTHER

## 2020-12-25 RX ORDER — ONDANSETRON HYDROCHLORIDE 2 MG/ML
4 INJECTION, SOLUTION INTRAVENOUS EVERY 8 HOURS PRN
Status: DISCONTINUED | OUTPATIENT
Start: 2020-12-25 | End: 2021-01-12 | Stop reason: HOSPADM

## 2020-12-25 RX ORDER — DEXTROSE 40 %
15-30 GEL (GRAM) ORAL AS NEEDED
Status: DISCONTINUED | OUTPATIENT
Start: 2020-12-25 | End: 2020-12-30 | Stop reason: SDUPTHER

## 2020-12-25 RX ORDER — ALBUTEROL SULFATE 90 UG/1
2 INHALANT RESPIRATORY (INHALATION) EVERY 6 HOURS PRN
Status: DISCONTINUED | OUTPATIENT
Start: 2020-12-25 | End: 2020-12-27

## 2020-12-25 RX ORDER — MELATONIN 5 MG
5 CAPSULE ORAL NIGHTLY PRN
Status: DISCONTINUED | OUTPATIENT
Start: 2020-12-25 | End: 2021-01-12 | Stop reason: HOSPADM

## 2020-12-25 RX ORDER — ATORVASTATIN CALCIUM 10 MG/1
10 TABLET, FILM COATED ORAL
Status: DISCONTINUED | OUTPATIENT
Start: 2020-12-25 | End: 2021-01-12 | Stop reason: HOSPADM

## 2020-12-25 RX ORDER — IBUPROFEN 200 MG
16-32 TABLET ORAL AS NEEDED
Status: DISCONTINUED | OUTPATIENT
Start: 2020-12-25 | End: 2020-12-30 | Stop reason: SDUPTHER

## 2020-12-25 RX ORDER — DEXAMETHASONE SODIUM PHOSPHATE 4 MG/ML
6 INJECTION, SOLUTION INTRA-ARTICULAR; INTRALESIONAL; INTRAMUSCULAR; INTRAVENOUS; SOFT TISSUE DAILY
Status: DISCONTINUED | OUTPATIENT
Start: 2020-12-25 | End: 2020-12-30

## 2020-12-25 RX ORDER — ACETAMINOPHEN 325 MG/1
650 TABLET ORAL EVERY 4 HOURS PRN
Status: DISCONTINUED | OUTPATIENT
Start: 2020-12-25 | End: 2021-01-12 | Stop reason: HOSPADM

## 2020-12-25 RX ORDER — ACETAMINOPHEN 650 MG/1
650 SUPPOSITORY RECTAL EVERY 4 HOURS PRN
Status: DISCONTINUED | OUTPATIENT
Start: 2020-12-25 | End: 2021-01-12 | Stop reason: HOSPADM

## 2020-12-25 RX ORDER — ACETAMINOPHEN 650 MG/20.3ML
650 LIQUID ORAL EVERY 4 HOURS PRN
Status: DISCONTINUED | OUTPATIENT
Start: 2020-12-25 | End: 2021-01-12 | Stop reason: HOSPADM

## 2020-12-25 RX ADMIN — SODIUM CHLORIDE: 9 INJECTION, SOLUTION INTRAVENOUS at 00:39

## 2020-12-25 RX ADMIN — ASPIRIN 81 MG: 81 TABLET, COATED ORAL at 08:54

## 2020-12-25 RX ADMIN — ENOXAPARIN SODIUM 40 MG: 40 INJECTION SUBCUTANEOUS at 05:16

## 2020-12-25 RX ADMIN — ATORVASTATIN CALCIUM 10 MG: 10 TABLET, FILM COATED ORAL at 17:06

## 2020-12-25 RX ADMIN — Medication 5 MG: at 23:19

## 2020-12-25 RX ADMIN — ACETAMINOPHEN 650 MG: 325 TABLET ORAL at 05:16

## 2020-12-25 RX ADMIN — THERA TABS 1 TABLET: TAB at 08:54

## 2020-12-25 RX ADMIN — ACETAMINOPHEN 650 MG: 325 TABLET ORAL at 21:30

## 2020-12-25 RX ADMIN — SODIUM CHLORIDE: 9 INJECTION, SOLUTION INTRAVENOUS at 05:17

## 2020-12-25 RX ADMIN — DEXAMETHASONE SODIUM PHOSPHATE 6 MG: 4 INJECTION, SOLUTION INTRA-ARTICULAR; INTRALESIONAL; INTRAMUSCULAR; INTRAVENOUS; SOFT TISSUE at 08:55

## 2020-12-25 NOTE — ED PROVIDER NOTES
HPI     Chief Complaint   Patient presents with   • Shortness of Breath   • COVID Related       64yo M w/recent history of COVID pneumonia, +COVID confirmed 12/22 in ED, discharged home, c/o worsening SOB, decreasing pulse ox at home to 80s%. Pt reports persistent fevers. Pt had CT chest during ED eval 12/22 that was negative for PE.      History provided by:  Patient and EMS personnel  Shortness of Breath  Severity:  Severe  Onset quality:  Gradual  Duration:  10 days  Timing:  Constant  Progression:  Worsening  Chronicity:  New  Relieved by:  Nothing  Worsened by:  Nothing  Ineffective treatments:  None tried  Associated symptoms: cough (Mild) and fever    Associated symptoms: no abdominal pain, no chest pain, no claudication, no diaphoresis, no ear pain, no rash, no sore throat, no sputum production, no syncope, no vomiting and no wheezing         Patient History     Past Medical History:   Diagnosis Date   • Lipid disorder        No past surgical history on file.    No family history on file.    Social History     Tobacco Use   • Smoking status: Former Smoker   • Smokeless tobacco: Never Used   Substance Use Topics   • Alcohol use: Yes   • Drug use: Not on file       Systems Reviewed from Nursing Triage:          Review of Systems     Review of Systems   Constitutional: Positive for chills and fever. Negative for diaphoresis.   HENT: Negative for ear pain and sore throat.    Eyes: Negative for pain and visual disturbance.   Respiratory: Positive for cough (Mild) and shortness of breath. Negative for sputum production and wheezing.    Cardiovascular: Negative for chest pain, palpitations, claudication, leg swelling and syncope.   Gastrointestinal: Positive for diarrhea (x 1 episode today). Negative for abdominal pain, nausea and vomiting.   Genitourinary: Negative for dysuria, frequency, hematuria and urgency.   Musculoskeletal: Positive for myalgias. Negative for arthralgias and back pain.   Skin: Negative for  color change and rash.   Neurological: Negative for seizures and syncope.   All other systems reviewed and are negative.       Physical Exam     ED Vitals    Date/Time Temp Pulse Resp BP SpO2 [facility]   12/24/20 2032 39.7 °C (103.5 °F) 78 29 113/77 95 % ALD          Pulse Ox %: 95 % (12/24/20 2108)  Pulse Ox Interpretation: Normal (12/24/20 2108)  Heart Rate: 73 (12/24/20 2108)  Rhythm Strip Interpretation: Normal Sinus Rhythm (12/24/20 2108)                                       Physical Exam  Vitals signs and nursing note reviewed.   Constitutional:       General: He is not in acute distress.     Appearance: He is well-developed. He is ill-appearing. He is not toxic-appearing.   HENT:      Head: Normocephalic and atraumatic.      Mouth/Throat:      Mouth: Mucous membranes are dry.   Eyes:      Conjunctiva/sclera: Conjunctivae normal.      Pupils: Pupils are equal, round, and reactive to light.   Neck:      Musculoskeletal: Neck supple.   Cardiovascular:      Rate and Rhythm: Normal rate and regular rhythm.      Pulses: Normal pulses.      Heart sounds: Normal heart sounds.   Pulmonary:      Effort: Pulmonary effort is normal. No respiratory distress.      Breath sounds: Rhonchi (Mild) present. No decreased breath sounds, wheezing or rales.   Abdominal:      General: Bowel sounds are normal.      Palpations: Abdomen is soft.      Tenderness: There is no abdominal tenderness.   Musculoskeletal: Normal range of motion.         General: No swelling or tenderness.      Right lower leg: No edema.      Left lower leg: No edema.   Skin:     General: Skin is warm and dry.      Coloration: Skin is not pale.      Findings: No rash.   Neurological:      General: No focal deficit present.      Mental Status: He is alert and oriented to person, place, and time.      Cranial Nerves: No cranial nerve deficit.      Sensory: No sensory deficit.      Motor: No weakness.              Procedures    Results     Procedure Component Value  Units Date/Time    CBC and differential [[id number]]  (Abnormal) Collected: 12/24/20 2042    Specimen: Blood, Venous Updated: 12/24/20 2150     WBC 2.65 K/uL      Comment: ALL RESULTS HAVE BEEN CHECKED. CONSISTENT WITH PREVIOUS RESULTS        RBC 3.99 M/uL      Hemoglobin 12.2 g/dL      Hematocrit 34.6 %      MCV 86.7 fL      MCH 30.6 pg      MCHC 35.3 g/dL      RDW 12.5 %      Platelets 121 K/uL      Comment: SPECIMEN QUALITY CHECKED        MPV 11.0 fL      Differential Type Manu     Neutrophils 67 %      Lymphocytes 20 %      Monocytes 10 %      Eosinophils 0 %      Basophils 0 %      Bands 2 %      Atypical Lymphocytes 1 %      Neutrophils, Absolute 1.78 K/uL      Lymphocytes, Absolute 0.53 K/uL      Monocytes, Absolute 0.27 K/uL      Eosinophils, Absolute 0.00 K/uL      Basophils, Absolute 0.00 K/uL      Bands, Absolute 0.05 K/uL      Atypical Lymphs, Absolute 0.03 K/uL      RBC Morphology Normal     PLT Morphology Normal     Platelet Estimate Decreased (51,000-149,000)    Troponin I [[id number]]  (Normal) Collected: 12/24/20 2042    Specimen: Blood, Venous Updated: 12/24/20 2119     Troponin I 0.02 ng/mL     Comprehensive metabolic panel [[id number]]  (Abnormal) Collected: 12/24/20 2042    Specimen: Blood, Venous Updated: 12/24/20 2119     Sodium 126 mEQ/L      Potassium 3.5 mEQ/L      Comment: Results obtained on plasma. Plasma Potassium values may be up to 0.4 mEQ/L less than serum values. The differences may be greater for patients with high platelet or white cell counts.        Chloride 91 mEQ/L      CO2 24 mEQ/L      BUN 21 mg/dL      Creatinine 0.9 mg/dL      Glucose 117 mg/dL      Calcium 8.3 mg/dL      AST (SGOT) 85 IU/L      ALT (SGPT) 53 IU/L      Alkaline Phosphatase 40 IU/L      Total Protein 7.2 g/dL      Comment: Test performed on plasma which typically contains approximately 0.4 g/dL more protein than serum.        Albumin 3.5 g/dL      Bilirubin, Total 1.0 mg/dL      eGFR >60.0 mL/min/1.73m*2       Anion Gap 11 mEQ/L     Lactate, w/ reflex repeat if > 2.0 [[id number]]  (Normal) Collected: 12/24/20 2042    Specimen: Blood, Venous Updated: 12/24/20 2057     Lactate 1.2 mmol/L     Blood Culture Blood, Venous [[id number]] Collected: 12/24/20 2042    Specimen: Blood, Venous Updated: 12/24/20 2050    Blood Culture Blood, Venous [[id number]] Collected: 12/24/20 2042    Specimen: Blood, Venous Updated: 12/24/20 2050          Imaging Results          X-RAY CHEST 1 VIEW (Final result)  Result time 12/24/20 21:33:57    Final result                 Impression:    IMPRESSION:  Bilateral groundglass densities left greater than right with interval increase  since prior study.             Narrative:      CLINICAL HISTORY: SOB    COMMENT: A single AP radiograph of the chest is obtained . Comparison is made to  prior exam of 12/22/2020.  Heart size is top normal.  There are patchy  groundglass densities noted more so in the left lung.  Patchy small right lower  lobe infiltrate.  No elba pleural effusions.  No pneumothorax.  Hilar and  mediastinal structures are normal.  Mild degenerative changes in the thoracic  spine.                                ECG 12 lead   ED Interpretation   Rhythm: [NSR]  Rate: 76  P waves: [normal interval]  QRS: [normal QRS]  Axis: [normal]  ST Segments: [no obvious ST elevation or ischemia]  Normal ECG                        ED Course & MDM     MDM         Clinical Impressions as of Dec 24 2233   Shortness of breath   Pneumonia due to COVID-19 virus   Hypoxia        Codie Flores DO  12/24/20 2233

## 2020-12-25 NOTE — CONSULTS
Consult Note    Subjective     Reason for Consult: Pancytopenia     Consulting Physician:  Graciela Narayan MD    History of Present Illness:  Nadeem Morales is a 65 y.o. male who was evaluated on consultation for pancytopenia.    Nadeem is admitted to the hospital for acute respiratory failure from COVID pneumonia. On admission, he was noted to have decreasing WBC which has now decreased to 1.44 and platelet count is 88K. He is on oxygen. No prior labs available to be as outpt     He is on supportive care and is afebrile     Outside records reviewed. Pertinent radiology results reviewed. Pertinent lab results reviewed.    Medical History:   Past Medical History:   Diagnosis Date   • Coronary artery disease     Hx if acute MI, s/p stent placement   • Lipid disorder        Surgical History:   Past Surgical History:   Procedure Laterality Date   • HERNIA REPAIR     • TONSILLECTOMY         Allergies: Patient has no known allergies.    Current Facility-Administered Medications   Medication Dose Route Frequency Provider Last Rate Last Admin   • acetaminophen (TYLENOL) tablet 650 mg  650 mg oral q4h PRN Debbie Whitfield MD   650 mg at 12/25/20 0516    Or   • acetaminophen (TYLENOL) suppository 650 mg  650 mg rectal q4h PRN Debbie Whitfield MD        Or   • acetaminophen (TYLENOL) 650 mg/20.3 mL solution 650 mg  650 mg oral q4h PRN Debbie Whitfield MD       • albuterol HFA (VENTOLIN HFA) 90 mcg/actuation inhaler 2 puff  2 puff inhalation q6h PRN Debbie Whitfield MD       • albuterol HFA (VENTOLIN HFA) 90 mcg/actuation inhaler 2 puff  2 puff inhalation q6h PRN Debbie Whitfield MD       • aspirin enteric coated tablet 81 mg  81 mg oral Daily Debbie Whitfield MD   81 mg at 12/25/20 0854   • atorvastatin (LIPITOR) tablet 10 mg  10 mg oral Daily (6p) Debbie Whitfield MD       • dexamethasone (DECADRON) injection 6 mg  6 mg intravenous Daily Debbie Whitfield MD   6 mg at 12/25/20 0855   • glucose chewable tablet 16-32 g of dextrose   16-32 g of dextrose oral PRN Debbie Whitfield MD        Or   • dextrose 40 % oral gel 15-30 g of dextrose  15-30 g of dextrose oral PRN Debbie Whitfield MD        Or   • glucagon (GLUCAGEN) injection 1 mg  1 mg intramuscular PRN Debbie Whitfield MD        Or   • dextrose in water injection 12.5 g  25 mL intravenous PRN Debbie Whitfield MD       • glucose chewable tablet 16-32 g of dextrose  16-32 g of dextrose oral PRN Debbie Whitfield MD        Or   • dextrose 40 % oral gel 15-30 g of dextrose  15-30 g of dextrose oral PRN Debbie Whitfield MD        Or   • glucagon (GLUCAGEN) injection 1 mg  1 mg intramuscular PRN Debbie Whitfield MD        Or   • dextrose in water injection 12.5 g  25 mL intravenous PRN Debbie Whitfield MD       • enoxaparin (LOVENOX) syringe 40 mg  40 mg subcutaneous Daily (6a) Debbie Whitfield MD   40 mg at 12/25/20 0516   • multivitamin tablet 1 tablet  1 tablet oral Daily Debbie Whitfield MD   1 tablet at 12/25/20 0854   • ondansetron (ZOFRAN) injection 4 mg  4 mg intravenous q8h PRN Debbie Whitfield MD            Social History:   Social History     Socioeconomic History   • Marital status:      Spouse name: None   • Number of children: None   • Years of education: None   • Highest education level: None   Occupational History   • None   Social Needs   • Financial resource strain: None   • Food insecurity     Worry: None     Inability: None   • Transportation needs     Medical: None     Non-medical: None   Tobacco Use   • Smoking status: Former Smoker   • Smokeless tobacco: Never Used   Substance and Sexual Activity   • Alcohol use: Yes   • Drug use: Defer   • Sexual activity: Yes   Lifestyle   • Physical activity     Days per week: None     Minutes per session: None   • Stress: None   Relationships   • Social connections     Talks on phone: None     Gets together: None     Attends Congregation service: None     Active member of club or organization: None     Attends meetings of clubs or  organizations: None     Relationship status: None   • Intimate partner violence     Fear of current or ex partner: None     Emotionally abused: None     Physically abused: None     Forced sexual activity: None   Other Topics Concern   • None   Social History Narrative   • None       Family History:   Family History   Problem Relation Age of Onset   • Lymphoma Biological Mother    • Colon cancer Biological Father          ROS    As above.  Other ros was deferred due to COVID       Vital signs in last 24 hours:  Temp:  [36.9 °C (98.4 °F)-39.7 °C (103.5 °F)] 36.9 °C (98.4 °F)  Heart Rate:  [59-78] 59  Resp:  [18-29] 18  BP: (110-123)/(61-77) 110/61    Physical Exam    Patient was not examined due to COVID and to prevent infection and reduce PPE use       Labs    Lab Results   Component Value Date    WBC 1.44 (LL) 12/25/2020    HGB 11.6 (L) 12/25/2020    HCT 32.9 (L) 12/25/2020    MCV 86.1 12/25/2020    PLT 88 (L) 12/25/2020     ANC - 0.79      X-ray Chest 1 View    Result Date: 12/24/2020  Narrative: CLINICAL HISTORY: SOB COMMENT: A single AP radiograph of the chest is obtained . Comparison is made to prior exam of 12/22/2020.  Heart size is top normal.  There are patchy groundglass densities noted more so in the left lung.  Patchy small right lower lobe infiltrate.  No elba pleural effusions.  No pneumothorax.  Hilar and mediastinal structures are normal.  Mild degenerative changes in the thoracic spine.     Impression: IMPRESSION: Bilateral groundglass densities left greater than right with interval increase since prior study.    X-ray Chest 1 View    Result Date: 12/22/2020  Narrative: CLINICAL HISTORY:  Hemoptysis.  COVID-19 COMMENT: Views: Single frontal portable.. Comparison date: none. The heart and mediastinal contours are intact with top normal heart size..  The trachea is midline.  Patchy airspace opacity noted predominantly in the lower lobes..  There  are no pleural effusions.  The osseous structures are  intact with degenerative change.     Impression: IMPRESSION: Patchy bilateral space opacity predominantly in the lower lobes.     Ct Chest Pulmonary Embolism With Iv Contrast    Result Date: 12/22/2020  Narrative: CLINICAL HISTORY: Shortness of breath COVID +, coughed blood, pain in L lung with cough   . TECHNIQUE: CT chest with contrast.  Pulmonary embolus protocol.  100 mL Omnipaque 350. CT DOSE:  One or more dose reduction techniques (e.g. automated exposure control, adjustment of the mA and/or kV according to patient size, use of iterative reconstruction technique) utilized for this examination. COMPARISON: None. COMMENT: --------------------------------------------------------------------------- Airway/Lungs: Patency of the trachea and central airways.  There are groundglass opacities noted predominantly in the left upper lobe peripherally and left lower lobe at the costophrenic angle.  This is noted to a lesser degree in the right middle lobe and right lower lobe.  Findings are consistent with known Covid pneumonia. No pleural effusions on either side. --------------------------------------------------------------------------- Mediastinum: No CT evidence of pulmonary embolus. Heart size is normal.  Coronary artery calcifications.  Aorta and great vessels are unremarkable.  Prominent reactive mediastinal lymph nodes are present. --------------------------------------------------------------------------- Neck, Chest wall, and Osseous structures: Lower neck is unremarkable.  Chest wall soft tissues are unremarkable.  Osseous structures are intact. -------------------------------------------------------------------------- Upper abdomen: Upper abdominal soft tissues are unremarkable. ---------------------------------------------------------------------------     Impression: IMPRESSION: 1.  No CT evidence of pulmonary embolus. 2.  Findings in the chest consistent with known history of Covid  pneumonia.      Assessment/Plan     1. Leukopenia and thrombocytopenia   Likely from acute infection from COVID     2. Anemia from acute illness     3. COVID Pneumonia     I discussed with Dr. Villalta     PLAN:  1. Monitor counts closely  2. There is no indication currently for growth factors  3. No indication for empiric antibiotics either at this time   4. Neutropenic precautions     Will continue to follow with you

## 2020-12-25 NOTE — ASSESSMENT & PLAN NOTE
Covid pneumonia with acute hypoxic respiratory failure  Chest x-ray with progressive patchy airspace opacities noted bilaterally  Not a candidate for remdesivir as symptoms started 13 days prior to admission  Decadron transitioned to the high dose protocol, completed 20 mg x5 days, currently getting 10 mg x5 days  Cultures no growth to 72 hours  Empiric antibiotics for suspected secondary bacterial infection  Rocephin transitioned to Ceftin to complete the course  Continue prone protocol  Stop trending inflammatory markers  Notified by infection control that it would be OK to remove isolation precautions on hospital day 14

## 2020-12-25 NOTE — ASSESSMENT & PLAN NOTE
Likely related to acute infection  Monitor CBC w/daily labs  Consult heme/onc, signed off now that numbers are improving  Improving daily

## 2020-12-25 NOTE — ASSESSMENT & PLAN NOTE
Due to COVID-19  Prone protocol  Chest x-ray with progressive patchy airspace opacities  Pulmonary, infectious disease following  Minimal oxygen requirements at rest but drops down to mid 80's with activity even with supplemental oxygen  Continue to supplement as needed  Continue PT/OT

## 2020-12-25 NOTE — CONSULTS
Infectious Disease Consult Note    Patient Name: Nadeem Morales  MR#: [id number]  : 1955  Admission Date: 2020  Consult Date: 20 3:01 PM   Consultant: Jean Hammond MD    Reason for Consult: COVID pneumonia  Referring Provider: dr taylor      History of Present Illness     Nadeem Morales is a 65 y.o. male who was admitted on 2020.for evaluation of dyspnea of 13 days duration, accompanied by  anosmia, dysgeusia Pt  prescribed a five day course of azithromycin IN ED pt observed to exhibit hypoxia and a positive sars cov2 pcr   Pt alert no rigors HA visual changes sore tthroat chest pain abdominal discomfort persistent diarrhea urinary symptoms joint swelling or acute skin changes      Outside records were reviewed.    Allergies: No Known Allergies    Medical History:   Past Medical History:   Diagnosis Date   • Coronary artery disease     Hx if acute MI, s/p stent placement   • Lipid disorder        Surgical History:   Past Surgical History:   Procedure Laterality Date   • HERNIA REPAIR     • TONSILLECTOMY         Social History:   Social History     Socioeconomic History   • Marital status:      Spouse name: None   • Number of children: None   • Years of education: None   • Highest education level: None   Occupational History   • None   Social Needs   • Financial resource strain: None   • Food insecurity     Worry: None     Inability: None   • Transportation needs     Medical: None     Non-medical: None   Tobacco Use   • Smoking status: Former Smoker   • Smokeless tobacco: Never Used   Substance and Sexual Activity   • Alcohol use: Yes   • Drug use: Defer   • Sexual activity: Yes   Lifestyle   • Physical activity     Days per week: None     Minutes per session: None   • Stress: None   Relationships   • Social connections     Talks on phone: None     Gets together: None     Attends [employer] service: None     Active member of club or organization: None     Attends meetings of clubs  or organizations: None     Relationship status: None   • Intimate partner violence     Fear of current or ex partner: None     Emotionally abused: None     Physically abused: None     Forced sexual activity: None   Other Topics Concern   • None   Social History Narrative   • None          Travel Exposure:   Travel and Exposure Screening      Most Recent Value   Travel Screening   Overnight hospitalization outside the [place] in the last year?  No Filed On: 12/24/2020 2039   Exposure Screening   Symptoms          Animal Exposure: none    Other Exposure: unkn    Family History:   Family History   Problem Relation Age of Onset   • Lymphoma Biological Mother    • Colon cancer Biological Father        Review of Systems    Pertinent items are noted in HPI.    Medications:    Current IP Meds (From admission, onward)        Frequency     atorvastatin (LIPITOR) tablet 10 mg      Daily (6p)     dexamethasone (DECADRON) injection 6 mg      Daily     aspirin enteric coated tablet 81 mg      Daily     multivitamin tablet 1 tablet      Daily     enoxaparin (LOVENOX) syringe 40 mg  (Non-Critically Ill COVID-19 Patients)      Daily (6a)     glucose chewable tablet 16-32 g of dextrose  (Hypoglycemia Treatment Protocol and Hyperglycemia Validation Protocol)      As needed     dextrose 40 % oral gel 15-30 g of dextrose  (Hypoglycemia Treatment Protocol and Hyperglycemia Validation Protocol)      As needed     glucagon (GLUCAGEN) injection 1 mg  (Hypoglycemia Treatment Protocol and Hyperglycemia Validation Protocol)      As needed     dextrose in water injection 12.5 g  (Hypoglycemia Treatment Protocol and Hyperglycemia Validation Protocol)      As needed     albuterol HFA (VENTOLIN HFA) 90 mcg/actuation inhaler 2 puff  (albuterol sulfate (Ventolin HFA) inhaler)      Every 6 hours PRN     acetaminophen (TYLENOL) tablet 650 mg  (Analgesics - acetaminophen pain or fever)      Every 4 hours PRN     acetaminophen (TYLENOL) suppository 650 mg   "(Analgesics - acetaminophen pain or fever)      Every 4 hours PRN     acetaminophen (TYLENOL) 650 mg/20.3 mL solution 650 mg  (Analgesics - acetaminophen pain or fever)      Every 4 hours PRN     ondansetron (ZOFRAN) injection 4 mg  (Nausea/Vomiting)      Every 8 hours PRN     sodium chloride 0.9 % infusion  Status:  Discontinued      Continuous     glucose chewable tablet 16-32 g of dextrose  (Hypoglycemia Treatment Protocol and Hyperglycemia Validation Protocol)      As needed     dextrose 40 % oral gel 15-30 g of dextrose  (Hypoglycemia Treatment Protocol and Hyperglycemia Validation Protocol)      As needed     glucagon (GLUCAGEN) injection 1 mg  (Hypoglycemia Treatment Protocol and Hyperglycemia Validation Protocol)      As needed     dextrose in water injection 12.5 g  (Hypoglycemia Treatment Protocol and Hyperglycemia Validation Protocol)      As needed     albuterol HFA (VENTOLIN HFA) 90 mcg/actuation inhaler 2 puff  (albuterol sulfate (Ventolin HFA) inhaler)      Every 6 hours PRN     acetaminophen (TYLENOL) tablet 650 mg      Once     dexamethasone (DECADRON) injection 6 mg      Once                Objective     Vital Signs:    Patient Vitals for the past 72 hrs:   BP Temp Temp src Pulse Resp SpO2 Height Weight   20 1200 107/64 37 °C (98.6 °F) Oral 65 18 94 % -- --   20 0800 110/61 36.9 °C (98.4 °F) Oral (!) 59 18 95 % -- --   20 0516 -- (!) 38.7 °C (101.6 °F) -- -- -- -- -- --   20 0500 113/62 (!) 38.7 °C (101.6 °F) Oral 65 18 94 % -- --   20 0400 -- -- -- 65 -- -- -- --   20 0100 -- -- -- 65 -- -- -- --   20 0040 123/73 37.7 °C (99.9 °F) Oral -- 20 94 % 1.753 m (5' 9.02\") 76.7 kg (169 lb 1.5 oz)   200 120/70 (!) 38.1 °C (100.6 °F) Oral 73 (!) 21 93 % -- --   20 113/77 (!) 39.7 °C (103.5 °F) Oral 78 (!) 29 95 % 1.753 m (5' 9\") 76.7 kg (169 lb 3.2 oz)       Temp (72hrs), Av.1 °C (100.6 °F), Min:36.9 °C (98.4 °F), Max:39.7 °C (103.5 " °F)      Physical Exam:    Alert no otorhinorrhea  Anicteric pupils reactive eomi  No stridor or meningismus  Lung moderate respiratory distress  Sinus rhythm  abd active bs no distension guarding or pulsation  slr intact  No joint effusions appreciated  No acute skin changes appreciated  Neuro cn intact ms 5/5 upper and lower plantars down    Lines, Drains, Airways, Wounds:       Labs:    CBC Results       12/25/20 12/24/20 12/22/20                    0501 2042 1124         WBC 1.44 2.65 2.45         RBC 3.82 3.99 4.43         HGB 11.6 12.2 13.4         HCT 32.9 34.6 38.7         MCV 86.1 86.7 87.4         MCH 30.4 30.6 30.2         MCHC 35.3 35.3 34.6         PLT 88 121 104         Comment for WBC at 0501 on 12/25/20: RESULTS CHECKED. This result has been called to KARO CHAN by Brianna Boyd on 12 25 20 at 07:09, and has been read back.     Comment for WBC at 2042 on 12/24/20: ALL RESULTS HAVE BEEN CHECKED. CONSISTENT WITH PREVIOUS RESULTS    Comment for WBC at 1124 on 12/22/20: ALL RESULTS HAVE BEEN CHECKED. This result has been called to BAILEY MICHAEL by Thania Strauss on 12 22 20 at 11:47, and has been read back.     Comment for PLT at 2042 on 12/24/20: SPECIMEN QUALITY CHECKED    Comment for PLT at 1124 on 12/22/20: RESULTS CHECKED. SPECIMEN QUALITY CHECKED      BMP Results       12/25/20 12/25/20 12/24/20                    0501 0100 2042          126 126          130           K 3.6 3.4 3.5          3.6           Cl 98 93 91          98           CO2 21 23 24          21           Glucose 126 122 117          126           BUN 17 17 21          17           Creatinine 0.7 0.8 0.9          0.7           Calcium 8.0 8.1 8.3          8.0           Anion Gap 11 10 11          11           EGFR >60.0 >60.0 >60.0          >60.0           Comment for K at 2042 on 12/24/20: Results obtained on plasma. Plasma Potassium values may be up to 0.4 mEQ/L less than serum values. The differences may be greater for  patients with high platelet or white cell counts.      PT/PTT Results       12/25/20 12/22/20                       0001 1124          PT 14.0 14.3          INR 1.1 1.1          PTT 37 32          Comment for INR at 0001 on 12/25/20: INR has no defined significance when PT is within Reference Range.    Comment for INR at 1124 on 12/22/20: INR has no defined significance when PT is within Reference Range.    Comment for PTT at 0001 on 12/25/20: The Standard Therapeutic Range for Heparin is 68 to 101 seconds.      UA Results       12/24/20                          2300           Color Yellow           Clarity Clear           Glucose Negative           Bilirubin Negative           Ketones +1           Sp Grav 1.023           Blood Negative           Ph 6.5           Protein +2           Urobilinogen 1.0           Nitrite Negative           Leuk Est Negative           WBC 0 TO 3           RBC 0 TO 4           Bacteria None Seen           Comment for Ketones at 2300 on 12/24/20: Free sulfhydryl drugs such as Mesna, Capoten, and Acetylcysteine (Mucomyst) may cause false positive ketonuria.    Comment for Blood at 2300 on 12/24/20: The sensitivity of the occult blood test is equivalent to approximately 4 intact RBC/HPF.    Comment for Leuk Est at 2300 on 12/24/20: Results can be falsely negative due to high specific gravity, some antibiotics, glucose >3 g/dl, or WBC other than neutrophils.      Lactate Results       12/24/20                          2042           Lactate 1.2                           Microbiology Results     ** No results found for the last 720 hours. **          Pathology Results     ** No results found for the last 720 hours. **          Echo:          Imaging:    Radiology Imaging   XR CHEST 1 VW    Narrative CLINICAL HISTORY: SOB    COMMENT: A single AP radiograph of the chest is obtained . Comparison is made to  prior exam of 12/22/2020.  Heart size is top normal.  There are patchy  groundglass  densities noted more so in the left lung.  Patchy small right lower  lobe infiltrate.  No elba pleural effusions.  No pneumothorax.  Hilar and  mediastinal structures are normal.  Mild degenerative changes in the thoracic  spine.      Impression IMPRESSION:  Bilateral groundglass densities left greater than right with interval increase  since prior study.       Assessment     covid-19  pneumonia     Pancytopenia  Secondary to acute viral infection  Follow response to intervention            Plan     abx mgmt  abx therapy not required    Discussed clinical presentation and therapeutic approach with pt including risks and M/M of dexamethasone    Recommend dexamethasone  Follow oxygen requirements

## 2020-12-25 NOTE — PROGRESS NOTES
Hospital Medicine Service -  Daily Progress Note       SUBJECTIVE   Interval History: Shortness of breath significantly improved since starting oxygen. He reports a remote history of smoking with mild emphysema not requiring any treatment. He feels better today.     OBJECTIVE      Vital signs in last 24 hours:  Temp:  [36.9 °C (98.4 °F)-39.7 °C (103.5 °F)] 36.9 °C (98.4 °F)  Heart Rate:  [59-78] 59  Resp:  [18-29] 18  BP: (110-123)/(61-77) 110/61  No intake or output data in the 24 hours ending 12/25/20 1102    PHYSICAL EXAMINATION      Physical Exam  Vitals signs and nursing note reviewed.   Constitutional:       Appearance: Normal appearance. He is well-developed.   HENT:      Head: Normocephalic and atraumatic.      Mouth/Throat:      Mouth: Mucous membranes are moist.      Pharynx: Oropharynx is clear.   Eyes:      Extraocular Movements: Extraocular movements intact.      Pupils: Pupils are equal, round, and reactive to light.   Neck:      Musculoskeletal: Normal range of motion and neck supple.   Cardiovascular:      Rate and Rhythm: Normal rate and regular rhythm.      Heart sounds: Normal heart sounds.   Pulmonary:      Effort: Respiratory distress present.      Breath sounds: Normal breath sounds.   Abdominal:      General: Abdomen is flat. Bowel sounds are normal.      Palpations: Abdomen is soft.   Musculoskeletal: Normal range of motion.   Skin:     General: Skin is warm and dry.   Neurological:      General: No focal deficit present.      Mental Status: He is alert and oriented to person, place, and time. Mental status is at baseline.   Psychiatric:         Mood and Affect: Mood normal.         Behavior: Behavior normal.         Thought Content: Thought content normal.         Judgment: Judgment normal.        LINES, CATHETERS, DRAINS, AIRWAYS, AND WOUNDS   Lines, Drains, Airways, Wounds:       Comments:      LABS / IMAGING / TELE      Labs  I have reviewed the patient's pertinent labs.  Significant  abnormals are Na 130.  WBC 1.44, Hb 11.6, Hb 88,     Imaging  I have independently reviewed the patient's pertinent imaging for this hospital visit. Significant abnormals are CXR shows bilateral infiltrates.    ECG/Telemetry  I have independently reviewed the telemetry. No events for the last 24 hours.    ASSESSMENT AND PLAN      Acute respiratory failure with hypoxia (CMS/HCC)  Assessment & Plan  Due to COVID-19  Prone protocol  Maintain saturations above 92% via NC      Pancytopenia (CMS/HCC)  Assessment & Plan  Likely related to acute infection  Monitor CBC w/daily labs   this AM  Consult heme/onc    Acute hyponatremia  Assessment & Plan  Likely hopovlemia  Serum sodium=126  IVF initiated in ER, will continue  Trend sodium level  Improving appropriately on IVF  Consider nephrology consult if no improvement overnight  Encourage PO intake    Coronary artery disease involving native coronary artery of native heart without angina pectoris  Assessment & Plan  Chronic, asymptomatic  Continue Lipitor, ASA    * Pneumonia due to COVID-19 virus  Assessment & Plan  Out patient COVID-19 test positive on 12/16, he had symptoms for a few days before that but he did not realize it was COVID  Continues to have limited taste/smell  Tried to stay home but O2 sats on home monitor were 84 prompting return to ER  CXR result as above  Continue contact/droplet isolation  Supplemental O2 (PRN) + Ventolin HFA (PRN)  Continue decadron for 10 days   Given how long symptoms have been going on for, unclear if he is still in the window for Remdesivir, defer to ID  ID consulted       VTE Assessment: Padua VTE Score: 1  VTE Prophylaxis Plan: Lovenox SQ  Code Status: Full Code  Estimated Discharge Date: 12/29/2020  Disposition Planning: Pending response to treatment     Salinas Villalta,   12/25/2020

## 2020-12-25 NOTE — PLAN OF CARE
Problem: Adult Inpatient Plan of Care  Goal: Readiness for Transition of Care  Intervention: Mutually Develop Transition Plan  Flowsheets (Taken 12/25/2020 1241)  Anticipated Discharge Disposition: home without services  Equipment Needed After Discharge: none  Discharge Coordination/Progress: See CM Note  Assistive Device/Animal Currently Used at Home: none  Transportation Concerns: car, none  Readmission Within the Last 30 Days: no previous admission in last 30 days  Patient/Family Anticipated Services at Transition: none  Patient/Family Anticipates Transition to: home  Transportation Anticipated: family or friend will provide  Concerns to be Addressed: no discharge needs identified    Case Management Note: Unable to meet with patient in the room due to Covid 19 precautions.  Spoke with patient via phone;  introduced self, verified ID, demographic info, and PCP.  PCP: Dr. Jeimy Pacheco  Pharmacy: [facility] [place]     Housing: Patient lives with spouse in a 2 story home with 2 steps to enter; 1/2 bath on first floor.      PLOF: Patient is independent with ADL's.  Works full time; self employed in plumbing and heating     DME: uses none.      Anticipated Disposition: Anticipate discharge to home without services. No skilled needs identified at this time.   Patient denies HC in past.       Will continue to follow    Plan of care discussed with patient; he will have a ride home and will be able to quarantine at home.  He has family close by to assist as needed.

## 2020-12-25 NOTE — H&P
Hospital Medicine Service -  History & Physical        CHIEF COMPLAINT   Shortness of breath     HISTORY OF PRESENT ILLNESS      Nadeem Morales is a 65 y.o. male with a past medical history of CAD (s/p acute MI and stent placement) who presents with SOB.  He is a former smoker (quit >20 years ago) and has no known history of asthma, COPD or other chronic lung disease. He was diagnosed with COVID 8 days ago and reports worsening SOB, fever, body aches and decreased O2 readings(SpO2 in 80s) on home pulse oximeter since that time. No associated nausea or vomiting, but there has been 1 episode of diarrhea. He endorses anosmia and ageusia, returning to the ER tonight with concerns he is not improving.       ER workup notable for hyponatremia, pancytopenia and no findings of PE on chest CT.  Today's CXR shows progression when compared to previous study done on 12/22/20.    His wife (Aneta) is his surrogate decision maker.     PAST MEDICAL AND SURGICAL HISTORY      Past Medical History:   Diagnosis Date   • Coronary artery disease     Hx if acute MI, s/p stent placement   • Lipid disorder        Past Surgical History:   Procedure Laterality Date   • HERNIA REPAIR     • TONSILLECTOMY         PCP: Stanley Valdes MD    MEDICATIONS      Prior to Admission medications    Medication Sig Start Date End Date Taking? Authorizing Provider   aspirin 81 mg enteric coated tablet Take 81 mg by mouth daily.    Milad Ahn MD   atorvastatin (LIPITOR) 10 mg tablet Take 10 mg by mouth daily.    Milad Ahn MD   azithromycin (ZITHROMAX) 250 mg tablet Take 2 tablets the first day, then 1 tablet daily for 4 days. 12/22/20 12/27/20  Yifan Conner MD   multivitamin tablet Take by mouth daily.    Milad Ahn MD       ALLERGIES      Patient has no known allergies.    FAMILY HISTORY      Family History   Problem Relation Age of Onset   • Lymphoma Biological Mother    • Colon cancer Biological Father         SOCIAL HISTORY      Social History     Socioeconomic History   • Marital status:      Spouse name: None   • Number of children: None   • Years of education: None   • Highest education level: None   Occupational History   • None   Social Needs   • Financial resource strain: None   • Food insecurity     Worry: None     Inability: None   • Transportation needs     Medical: None     Non-medical: None   Tobacco Use   • Smoking status: Former Smoker   • Smokeless tobacco: Never Used   Substance and Sexual Activity   • Alcohol use: Yes   • Drug use: Defer   • Sexual activity: Yes   Lifestyle   • Physical activity     Days per week: None     Minutes per session: None   • Stress: None   Relationships   • Social connections     Talks on phone: None     Gets together: None     Attends Anabaptism service: None     Active member of club or organization: None     Attends meetings of clubs or organizations: None     Relationship status: None   • Intimate partner violence     Fear of current or ex partner: None     Emotionally abused: None     Physically abused: None     Forced sexual activity: None   Other Topics Concern   • None   Social History Narrative   • None       REVIEW OF SYSTEMS      All other systems reviewed and negative except as noted in HPI    PHYSICAL EXAMINATION      Temp:  [38.1 °C (100.6 °F)-39.7 °C (103.5 °F)] 38.1 °C (100.6 °F)  Heart Rate:  [73-78] 73  Resp:  [21-29] 21  BP: (113-120)/(70-77) 120/70  Body mass index is 24.99 kg/m².    Physical Exam  Vitals signs reviewed.   Constitutional:       General: He is not in acute distress.     Appearance: He is not ill-appearing.   HENT:      Head: Normocephalic.      Nose: Nose normal.      Mouth/Throat:      Mouth: Mucous membranes are moist.      Pharynx: Oropharynx is clear.   Eyes:      Extraocular Movements: Extraocular movements intact.      Conjunctiva/sclera: Conjunctivae normal.      Pupils: Pupils are equal, round, and reactive to light.    Neck:      Musculoskeletal: Neck supple.   Cardiovascular:      Rate and Rhythm: Normal rate and regular rhythm.      Pulses: Normal pulses.   Pulmonary:      Effort: Tachypnea present.      Breath sounds: Decreased air movement present. No wheezing.   Abdominal:      General: Bowel sounds are normal.      Palpations: Abdomen is soft.      Tenderness: There is no abdominal tenderness. There is no guarding.   Musculoskeletal: Normal range of motion.      Right lower leg: No edema.      Left lower leg: No edema.   Skin:     General: Skin is warm and dry.      Capillary Refill: Capillary refill takes less than 2 seconds.   Neurological:      General: No focal deficit present.      Mental Status: He is alert and oriented to person, place, and time.   Psychiatric:         Mood and Affect: Mood is anxious.         LABS / IMAGING / EKG        Labs  I have reviewed the patient's pertinent labs.  Significant abnormals are sodium=126, WBC=2.65, H&H=12.2/34.6, Wen=850.    Imaging  Chest CT:  IMPRESSION:   1.  No CT evidence of pulmonary embolus.  2.  Findings in the chest consistent with known history of Covid pneumonia.    CXR:  IMPRESSION:  Bilateral groundglass densities left greater than right with interval increase  since prior study.    ECG/Telemetry  I have independently reviewed the ECG. No significant findings.    ASSESSMENT AND PLAN           * Pneumonia due to COVID-19 virus  Assessment & Plan  CXR result as above  Continue contact/droplet isolation  Supplemental O2 (PRN) + Ventolin HFA (PRN)  Decadron initiated in ER, will continue  ID consult in AM    Acute hyponatremia  Assessment & Plan  Serum sodium=126  IVF initiated in ER, will continue  Trend sodium level  Consider nephrology consult if no improvement overnight    Pancytopenia (CMS/HCC)  Assessment & Plan  Likely related to acute infection  Monitor CBC w/daily labs    Coronary artery disease involving native coronary artery of native heart without angina  pectoris  Assessment & Plan  Chronic, asymptomatic  Continue Lipitor, ASA         VTE Assessment: Padua VTE Score: 1  VTE Prophylaxis Plan: Lovenox SQ  Code Status: Full Code      Estimated Discharge Date: 12/29/2020  Disposition Planning: pending clinical response     Debbie Whitfield MD  12/24/2020

## 2020-12-26 LAB
ALBUMIN SERPL-MCNC: 2.9 G/DL (ref 3.4–5)
ALP SERPL-CCNC: 38 IU/L (ref 35–126)
ALT SERPL-CCNC: 63 IU/L (ref 16–63)
ANION GAP SERPL CALC-SCNC: 10 MEQ/L (ref 3–15)
AST SERPL-CCNC: 89 IU/L (ref 15–41)
BASOPHILS # BLD: 0 K/UL (ref 0.01–0.1)
BASOPHILS NFR BLD: 0 %
BILIRUB SERPL-MCNC: 1 MG/DL (ref 0.3–1.2)
BUN SERPL-MCNC: 21 MG/DL (ref 8–20)
CALCIUM SERPL-MCNC: 8.3 MG/DL (ref 8.9–10.3)
CHLORIDE SERPL-SCNC: 98 MEQ/L (ref 98–109)
CK SERPL-CCNC: 209 U/L (ref 16–300)
CK SERPL-CCNC: 303 U/L (ref 16–300)
CO2 SERPL-SCNC: 24 MEQ/L (ref 22–32)
CREAT SERPL-MCNC: 0.7 MG/DL (ref 0.8–1.3)
CRP SERPL-MCNC: 108.6 MG/L
CRP SERPL-MCNC: 70.2 MG/L
DIFFERENTIAL METHOD BLD: ABNORMAL
EOSINOPHIL # BLD: 0 K/UL (ref 0.04–0.54)
EOSINOPHIL NFR BLD: 0 %
ERYTHROCYTE [DISTWIDTH] IN BLOOD BY AUTOMATED COUNT: 12.9 % (ref 11.6–14.4)
FERRITIN SERPL-MCNC: >1500 NG/ML (ref 24–250)
GFR SERPL CREATININE-BSD FRML MDRD: >60 ML/MIN/1.73M*2
GLUCOSE SERPL-MCNC: 137 MG/DL (ref 70–99)
HCT VFR BLDCO AUTO: 34.4 % (ref 40.1–51)
HGB BLD-MCNC: 12 G/DL (ref 13.7–17.5)
LDH SERPL L TO P-CCNC: 473 IU/L (ref 98–192)
LDH SERPL L TO P-CCNC: 535 IU/L (ref 98–192)
LYMPHOCYTES # BLD: 0.52 K/UL (ref 1.2–3.5)
LYMPHOCYTES NFR BLD: 15 %
MCH RBC QN AUTO: 30.4 PG (ref 28–33.2)
MCHC RBC AUTO-ENTMCNC: 34.9 G/DL (ref 32.2–36.5)
MCV RBC AUTO: 87.1 FL (ref 83–98)
MONOCYTES # BLD: 0.21 K/UL (ref 0.3–1)
MONOCYTES NFR BLD: 6 %
NEUTS BAND # BLD: 0.1 K/UL (ref 0–0.53)
NEUTS BAND # BLD: 2.56 K/UL (ref 1.7–7)
NEUTS BAND NFR BLD: 3 %
NEUTS SEG NFR BLD: 74 %
OVALOCYTES BLD QL SMEAR: ABNORMAL
PDW BLD AUTO: 11.4 FL (ref 9.4–12.4)
PLAT MORPH BLD: NORMAL
PLATELET # BLD AUTO: 212 K/UL (ref 150–350)
PLATELET # BLD EST: ABNORMAL 10*3/UL
POTASSIUM SERPL-SCNC: 3.8 MEQ/L (ref 3.6–5.1)
PROT SERPL-MCNC: 6.2 G/DL (ref 6–8.2)
RBC # BLD AUTO: 3.95 M/UL (ref 4.5–5.8)
SODIUM SERPL-SCNC: 132 MEQ/L (ref 136–144)
VARIANT LYMPHS # BLD MANUAL: 0.07 K/UL
VARIANT LYMPHS NFR BLD: 2 %
WBC # BLD AUTO: 3.46 K/UL (ref 3.8–10.5)

## 2020-12-26 PROCEDURE — 99233 SBSQ HOSP IP/OBS HIGH 50: CPT | Mod: CR | Performed by: INTERNAL MEDICINE

## 2020-12-26 PROCEDURE — 86140 C-REACTIVE PROTEIN: CPT | Performed by: HOSPITALIST

## 2020-12-26 PROCEDURE — 63600000 HC DRUGS/DETAIL CODE: Performed by: HOSPITALIST

## 2020-12-26 PROCEDURE — 85025 COMPLETE CBC W/AUTO DIFF WBC: CPT | Performed by: HOSPITALIST

## 2020-12-26 PROCEDURE — 80053 COMPREHEN METABOLIC PANEL: CPT | Performed by: HOSPITALIST

## 2020-12-26 PROCEDURE — 63700000 HC SELF-ADMINISTRABLE DRUG: Performed by: HOSPITALIST

## 2020-12-26 PROCEDURE — 12000000 HC ROOM AND CARE MED/SURG

## 2020-12-26 PROCEDURE — 82728 ASSAY OF FERRITIN: CPT | Performed by: HOSPITALIST

## 2020-12-26 PROCEDURE — 83615 LACTATE (LD) (LDH) ENZYME: CPT | Performed by: HOSPITALIST

## 2020-12-26 PROCEDURE — 82550 ASSAY OF CK (CPK): CPT | Performed by: HOSPITALIST

## 2020-12-26 RX ADMIN — DEXAMETHASONE SODIUM PHOSPHATE 6 MG: 4 INJECTION, SOLUTION INTRA-ARTICULAR; INTRALESIONAL; INTRAMUSCULAR; INTRAVENOUS; SOFT TISSUE at 09:20

## 2020-12-26 RX ADMIN — ENOXAPARIN SODIUM 40 MG: 40 INJECTION SUBCUTANEOUS at 06:04

## 2020-12-26 RX ADMIN — THERA TABS 1 TABLET: TAB at 09:20

## 2020-12-26 RX ADMIN — ASPIRIN 81 MG: 81 TABLET, COATED ORAL at 09:20

## 2020-12-26 RX ADMIN — ATORVASTATIN CALCIUM 10 MG: 10 TABLET, FILM COATED ORAL at 17:24

## 2020-12-26 NOTE — PROGRESS NOTES
Daily Progress Note    Subjective    Interval History: Events of the last day noted     Objective    Review of Systems   As above     Vital signs in last 24 hours:  Temp:  [36.7 °C (98.1 °F)-38.3 °C (101 °F)] 36.9 °C (98.5 °F)  Heart Rate:  [65-73] 70  Resp:  [18-20] 18  BP: (101-113)/(61-68) 109/63    Physical Exam  Deferred     Labs  Lab Results   Component Value Date    WBC 3.46 (L) 12/26/2020    HGB 12.0 (L) 12/26/2020    HCT 34.4 (L) 12/26/2020    MCV 87.1 12/26/2020     12/26/2020     ANC - 2.56    Assessment & Plan    1. Leukopenia and thrombocytopenia   Likely from acute infection from COVID   Improved today      2. Anemia from acute illness improved      3. COVID Pneumonia         PLAN:  1. Monitor counts closely  2. There is no indication currently for growth factors  3. No indication for empiric antibiotics either at this time   4. Will sign off as counts have improved. Reconsult if needed            Graciela Narayan MD  12/26/20

## 2020-12-26 NOTE — PROGRESS NOTES
Hospital Medicine Service -  Daily Progress Note       SUBJECTIVE   Interval History: Pleasant, understands ongoing need for supplemental oxygen and is agreeable to stay. Shortness of breath about the same as yesterday.     OBJECTIVE      Vital signs in last 24 hours:  Temp:  [36.7 °C (98.1 °F)-38.3 °C (101 °F)] 36.9 °C (98.5 °F)  Heart Rate:  [70-73] 70  Resp:  [18-20] 18  BP: (101-113)/(61-68) 109/63    Intake/Output Summary (Last 24 hours) at 12/26/2020 1246  Last data filed at 12/26/2020 0500  Gross per 24 hour   Intake 200 ml   Output --   Net 200 ml       PHYSICAL EXAMINATION      Physical Exam  Vitals signs and nursing note reviewed.   Constitutional:       Appearance: Normal appearance. He is well-developed.   HENT:      Head: Normocephalic and atraumatic.      Mouth/Throat:      Mouth: Mucous membranes are moist.      Pharynx: Oropharynx is clear.   Eyes:      Extraocular Movements: Extraocular movements intact.      Pupils: Pupils are equal, round, and reactive to light.   Neck:      Musculoskeletal: Normal range of motion and neck supple.   Cardiovascular:      Rate and Rhythm: Normal rate and regular rhythm.      Heart sounds: Normal heart sounds.   Pulmonary:      Effort: Pulmonary effort is normal.      Breath sounds: Normal breath sounds.   Abdominal:      General: Abdomen is flat. Bowel sounds are normal.      Palpations: Abdomen is soft.   Musculoskeletal: Normal range of motion.   Skin:     General: Skin is warm and dry.   Neurological:      General: No focal deficit present.      Mental Status: He is alert and oriented to person, place, and time. Mental status is at baseline.   Psychiatric:         Mood and Affect: Mood normal.         Behavior: Behavior normal.         Thought Content: Thought content normal.         Judgment: Judgment normal.        LINES, CATHETERS, DRAINS, AIRWAYS, AND WOUNDS   Lines, Drains, Airways, Wounds:       Comments:      LABS / IMAGING / TELE      Labs  I have  reviewed the patient's pertinent labs.  Significant abnormals are Na 132, AST 89, ALT 63. WBC 3.46, Hb 12, plt 212    Imaging  Not applicable    ECG/Telemetry  I have independently reviewed the telemetry. No events for the last 24 hours.    ASSESSMENT AND PLAN      Acute respiratory failure with hypoxia (CMS/HCC)  Assessment & Plan  Due to COVID-19  Prone protocol  Maintain saturations above 92% via NC  Sats holding with 4-5 L NC    Pancytopenia (CMS/HCC)  Assessment & Plan  Likely related to acute infection  Monitor CBC w/daily labs  Consult heme/onc, signed off now that numbers are improving  Improving       Acute hyponatremia  Assessment & Plan  Likely hopovlemia  Serum sodium=126  IVF initiated in ER, will continue  Trend sodium level  Improving appropriately on IVF  Consider nephrology consult if no improvement overnight  Encourage PO intake  Continues to slowly improve    Coronary artery disease involving native coronary artery of native heart without angina pectoris  Assessment & Plan  Chronic, asymptomatic  Continue Lipitor, ASA    * Pneumonia due to COVID-19 virus  Assessment & Plan  Out patient COVID-19 test positive on 12/16, he had symptoms for a few days before that but he did not realize it was COVID  Continues to have limited taste/smell  Tried to stay home but O2 sats on home monitor were 84 prompting return to ER  CXR result as above  Continue contact/droplet isolation  Supplemental O2 (PRN) + Ventolin HFA (PRN)  Continue decadron 6 mg for 10 days   Not a candidate for Remdesivir  ID consulted       VTE Assessment: Padua VTE Score: 1  VTE Prophylaxis Plan: Lovenox SQ  Code Status: Full Code  Estimated Discharge Date: 12/29/2020  Disposition Planning: Pending response to treatment     Salinas Villalta,   12/26/2020

## 2020-12-26 NOTE — PROGRESS NOTES
"Infectious Disease Progress Note    Patient Name: Nadeem Morales  MR#: [id number]  : 1955  Admission Date: 2020  Date: 20   Time: 4:12 PM   Author: Jean Hammond MD    Major Events: Pt alert, dyspnea appreciated, no fever rigors no chest pain no increased stool formation    Antibiotics:        Subjective     Review of Systems    Pertinent items are noted in HPI.    Objective     Vital Signs:    Patient Vitals for the past 72 hrs:   BP Temp Temp src Pulse Resp SpO2 Height Weight   20 1200 110/61 37.8 °C (100 °F) Oral 69 17 92 % -- --   20 0800 109/63 36.9 °C (98.5 °F) Oral 70 18 92 % -- --   20 0500 -- -- -- -- -- 93 % -- --   20 0400 104/63 37.2 °C (99 °F) Oral 70 20 -- -- --   20 0000 101/61 37.1 °C (98.7 °F) Oral 71 20 94 % -- --   20 2129 -- -- -- -- -- 93 % -- --   20 2100 113/62 (!) 38.3 °C (101 °F) Oral 73 20 (!) 89 % -- --   20 1600 113/68 36.7 °C (98.1 °F) Oral 72 18 94 % -- --   20 1200 107/64 37 °C (98.6 °F) Oral 65 18 94 % -- --   20 0800 110/61 36.9 °C (98.4 °F) Oral (!) 59 18 95 % -- --   20 0516 -- (!) 38.7 °C (101.6 °F) -- -- -- -- -- --   20 0500 113/62 (!) 38.7 °C (101.6 °F) Oral 65 18 94 % -- --   20 0400 -- -- -- 65 -- -- -- --   20 0100 -- -- -- 65 -- -- -- --   20 0040 123/73 37.7 °C (99.9 °F) Oral -- 20 94 % 1.753 m (5' 9.02\") 76.7 kg (169 lb 1.5 oz)   20 120/70 (!) 38.1 °C (100.6 °F) Oral 73 (!) 21 93 % -- --   20 113/77 (!) 39.7 °C (103.5 °F) Oral 78 (!) 29 95 % 1.753 m (5' 9\") 76.7 kg (169 lb 3.2 oz)       Temp (72hrs), Av.8 °C (100 °F), Min:36.7 °C (98.1 °F), Max:39.7 °C (103.5 °F)      Physical Exam:    Anicteric  No stridor or meningismus  Lung moderate respiratory distress  Iv access intact  abd no distension  No joint effusions  Neuro cn intact ms 5/5 upper and lower    Lines, Drains, Airways, Wounds:       Labs:    CBC Results       20 " 12/25/20 12/24/20                    0410 0501 2042         WBC 3.46 1.44 2.65         RBC 3.95 3.82 3.99         HGB 12.0 11.6 12.2         HCT 34.4 32.9 34.6         MCV 87.1 86.1 86.7         MCH 30.4 30.4 30.6         MCHC 34.9 35.3 35.3          88 121         Comment for WBC at 0410 on 12/26/20: RESULTS CHECKED    Comment for WBC at 0501 on 12/25/20: RESULTS CHECKED. This result has been called to KARO CHAN by Brianna Boyd on 12 25 20 at 07:09, and has been read back.     Comment for WBC at 2042 on 12/24/20: ALL RESULTS HAVE BEEN CHECKED. CONSISTENT WITH PREVIOUS RESULTS    Comment for PLT at 0410 on 12/26/20: RESULTS CHECKED    Comment for PLT at 2042 on 12/24/20: SPECIMEN QUALITY CHECKED      BMP Results       12/26/20 12/25/20 12/25/20                    0411 0501 0100          130 126           130          K 3.8 3.6 3.4           3.6          Cl 98 98 93           98          CO2 24 21 23           21          Glucose 137 126 122           126          BUN 21 17 17           17          Creatinine 0.7 0.7 0.8           0.7          Calcium 8.3 8.0 8.1           8.0          Anion Gap 10 11 10           11          EGFR >60.0 >60.0 >60.0           >60.0                    PT/PTT Results       12/25/20 12/22/20                       0001 1124          PT 14.0 14.3          INR 1.1 1.1          PTT 37 32          Comment for INR at 0001 on 12/25/20: INR has no defined significance when PT is within Reference Range.    Comment for INR at 1124 on 12/22/20: INR has no defined significance when PT is within Reference Range.    Comment for PTT at 0001 on 12/25/20: The Standard Therapeutic Range for Heparin is 68 to 101 seconds.      UA Results       12/24/20                          2300           Color Yellow           Clarity Clear           Glucose Negative           Bilirubin Negative           Ketones +1           Sp Grav 1.023           Blood Negative           Ph 6.5           Protein  +2           Urobilinogen 1.0           Nitrite Negative           Leuk Est Negative           WBC 0 TO 3           RBC 0 TO 4           Bacteria None Seen           Comment for Ketones at 2300 on 12/24/20: Free sulfhydryl drugs such as Mesna, Capoten, and Acetylcysteine (Mucomyst) may cause false positive ketonuria.    Comment for Blood at 2300 on 12/24/20: The sensitivity of the occult blood test is equivalent to approximately 4 intact RBC/HPF.    Comment for Leuk Est at 2300 on 12/24/20: Results can be falsely negative due to high specific gravity, some antibiotics, glucose >3 g/dl, or WBC other than neutrophils.      Lactate Results       12/24/20 2042           Lactate 1.2                           Microbiology Results     Procedure Component Value Units Date/Time    Blood Culture Blood, Venous [[id number]] Collected: 12/24/20 2042    Specimen: Blood, Venous Updated: 12/25/20 2301     Culture No growth at 18-24 hours    Blood Culture Blood, Venous [[id number]] Collected: 12/24/20 2042    Specimen: Blood, Venous Updated: 12/25/20 2301     Culture No growth at 18-24 hours          Pathology Results     ** No results found for the last 720 hours. **          Echo:          Imaging:    Radiology Imaging   XR CHEST 1 VW    Narrative CLINICAL HISTORY: SOB    COMMENT: A single AP radiograph of the chest is obtained . Comparison is made to  prior exam of 12/22/2020.  Heart size is top normal.  There are patchy  groundglass densities noted more so in the left lung.  Patchy small right lower  lobe infiltrate.  No elba pleural effusions.  No pneumothorax.  Hilar and  mediastinal structures are normal.  Mild degenerative changes in the thoracic  spine.      Impression IMPRESSION:  Bilateral groundglass densities left greater than right with interval increase  since prior study.       Assessment         covid-19  pneumonia      Pancytopenia  Secondary to acute viral infection  Follow response to  intervention        Plan     Recommend dexamethasone    Discussed clinical presentation with pt

## 2020-12-27 ENCOUNTER — APPOINTMENT (INPATIENT)
Dept: RADIOLOGY | Facility: HOSPITAL | Age: 65
DRG: 177 | End: 2020-12-27
Attending: INTERNAL MEDICINE
Payer: MEDICARE

## 2020-12-27 LAB
ALBUMIN SERPL-MCNC: 2.8 G/DL (ref 3.4–5)
ALP SERPL-CCNC: 37 IU/L (ref 35–126)
ALT SERPL-CCNC: 132 IU/L (ref 16–63)
ANION GAP SERPL CALC-SCNC: 8 MEQ/L (ref 3–15)
AST SERPL-CCNC: 141 IU/L (ref 15–41)
BASOPHILS # BLD: 0 K/UL (ref 0.01–0.1)
BASOPHILS NFR BLD: 0 %
BILIRUB SERPL-MCNC: 1 MG/DL (ref 0.3–1.2)
BUN SERPL-MCNC: 23 MG/DL (ref 8–20)
CALCIUM SERPL-MCNC: 8.1 MG/DL (ref 8.9–10.3)
CHLORIDE SERPL-SCNC: 99 MEQ/L (ref 98–109)
CO2 SERPL-SCNC: 25 MEQ/L (ref 22–32)
CREAT SERPL-MCNC: 0.7 MG/DL (ref 0.8–1.3)
DIFFERENTIAL METHOD BLD: ABNORMAL
EOSINOPHIL # BLD: 0 K/UL (ref 0.04–0.54)
EOSINOPHIL NFR BLD: 0 %
ERYTHROCYTE [DISTWIDTH] IN BLOOD BY AUTOMATED COUNT: 12.9 % (ref 11.6–14.4)
GFR SERPL CREATININE-BSD FRML MDRD: >60 ML/MIN/1.73M*2
GLUCOSE SERPL-MCNC: 136 MG/DL (ref 70–99)
HCT VFR BLDCO AUTO: 31.9 % (ref 40.1–51)
HGB BLD-MCNC: 11.3 G/DL (ref 13.7–17.5)
LYMPHOCYTES # BLD: 0.19 K/UL (ref 1.2–3.5)
LYMPHOCYTES NFR BLD: 5 %
MCH RBC QN AUTO: 30.6 PG (ref 28–33.2)
MCHC RBC AUTO-ENTMCNC: 35.4 G/DL (ref 32.2–36.5)
MCV RBC AUTO: 86.4 FL (ref 83–98)
MONOCYTES # BLD: 0.27 K/UL (ref 0.3–1)
MONOCYTES NFR BLD: 7 %
NEUTS BAND # BLD: 0.12 K/UL (ref 0–0.53)
NEUTS BAND # BLD: 3.3 K/UL (ref 1.7–7)
NEUTS BAND NFR BLD: 3 %
NEUTS SEG NFR BLD: 85 %
PDW BLD AUTO: 12.3 FL (ref 9.4–12.4)
PLATELET # BLD AUTO: ABNORMAL 10*3/UL
PLATELET # BLD EST: ABNORMAL 10*3/UL
POTASSIUM SERPL-SCNC: 4 MEQ/L (ref 3.6–5.1)
PROT SERPL-MCNC: 6.1 G/DL (ref 6–8.2)
RBC # BLD AUTO: 3.69 M/UL (ref 4.5–5.8)
RBC MORPH BLD: NORMAL
SODIUM SERPL-SCNC: 132 MEQ/L (ref 136–144)
WBC # BLD AUTO: 3.88 K/UL (ref 3.8–10.5)

## 2020-12-27 PROCEDURE — 85025 COMPLETE CBC W/AUTO DIFF WBC: CPT | Performed by: HOSPITALIST

## 2020-12-27 PROCEDURE — 99233 SBSQ HOSP IP/OBS HIGH 50: CPT | Mod: CR | Performed by: INTERNAL MEDICINE

## 2020-12-27 PROCEDURE — 27900059 OXYGEN DAILY

## 2020-12-27 PROCEDURE — 63600000 HC DRUGS/DETAIL CODE: Performed by: HOSPITALIST

## 2020-12-27 PROCEDURE — 63700000 HC SELF-ADMINISTRABLE DRUG: Performed by: HOSPITALIST

## 2020-12-27 PROCEDURE — 12000000 HC ROOM AND CARE MED/SURG

## 2020-12-27 PROCEDURE — 63600000 HC DRUGS/DETAIL CODE: Performed by: INTERNAL MEDICINE

## 2020-12-27 PROCEDURE — 71045 X-RAY EXAM CHEST 1 VIEW: CPT

## 2020-12-27 PROCEDURE — 80053 COMPREHEN METABOLIC PANEL: CPT | Performed by: HOSPITALIST

## 2020-12-27 RX ORDER — ENOXAPARIN SODIUM 100 MG/ML
0.5 INJECTION SUBCUTANEOUS
Status: DISCONTINUED | OUTPATIENT
Start: 2020-12-27 | End: 2021-01-08

## 2020-12-27 RX ADMIN — ATORVASTATIN CALCIUM 10 MG: 10 TABLET, FILM COATED ORAL at 16:58

## 2020-12-27 RX ADMIN — THERA TABS 1 TABLET: TAB at 08:43

## 2020-12-27 RX ADMIN — ENOXAPARIN SODIUM 40 MG: 40 INJECTION SUBCUTANEOUS at 17:00

## 2020-12-27 RX ADMIN — ASPIRIN 81 MG: 81 TABLET, COATED ORAL at 08:42

## 2020-12-27 RX ADMIN — DEXAMETHASONE SODIUM PHOSPHATE 6 MG: 4 INJECTION, SOLUTION INTRA-ARTICULAR; INTRALESIONAL; INTRAMUSCULAR; INTRAVENOUS; SOFT TISSUE at 08:43

## 2020-12-27 RX ADMIN — ALBUTEROL SULFATE 2 PUFF: 90 AEROSOL, METERED RESPIRATORY (INHALATION) at 05:21

## 2020-12-27 RX ADMIN — ENOXAPARIN SODIUM 40 MG: 40 INJECTION SUBCUTANEOUS at 05:21

## 2020-12-27 NOTE — CONSULTS
Pulmonology Consult Note    Reason For Consult: COVID pneumonia    HPI:   65yoM w/ pmh of CAD and HLD who was admitted 12/24 with COVID pneumonia with escalating oxygen requirements, for which we were consulted. CXR on admission w/ bilateral infiltrates, CT chest negative for PE. Seen by ID and treated w/ IV steroids. Patient reports he is a former 30-pack year smoker, quit 26 years ago. Works as  and with known asbestosis exposure. Never seen pulmonologist. He initially required low flow O2 on arrival, he was transitioned to HHFNC this AM given presistent oxygen saturations in the 80's. Patient denies chest pain, sob, cough or sputum production. Adequate saturation currently on 40L w/ 70% FiO2.    Past Medical History:   Diagnosis Date   • Coronary artery disease     Hx if acute MI, s/p stent placement   • Lipid disorder      Past Surgical History:   Procedure Laterality Date   • HERNIA REPAIR     • TONSILLECTOMY       Social History     Socioeconomic History   • Marital status:      Spouse name: None   • Number of children: None   • Years of education: None   • Highest education level: None   Occupational History   • None   Social Needs   • Financial resource strain: None   • Food insecurity     Worry: None     Inability: None   • Transportation needs     Medical: None     Non-medical: None   Tobacco Use   • Smoking status: Former Smoker   • Smokeless tobacco: Never Used   Substance and Sexual Activity   • Alcohol use: Yes   • Drug use: Defer   • Sexual activity: Yes   Lifestyle   • Physical activity     Days per week: None     Minutes per session: None   • Stress: None   Relationships   • Social connections     Talks on phone: None     Gets together: None     Attends Hinduism service: None     Active member of club or organization: None     Attends meetings of clubs or organizations: None     Relationship status: None   • Intimate partner violence     Fear of current or ex partner: None      Emotionally abused: None     Physically abused: None     Forced sexual activity: None   Other Topics Concern   • None   Social History Narrative   • None     Family History   Problem Relation Age of Onset   • Lymphoma Biological Mother    • Colon cancer Biological Father      Patient has no known allergies.  Current Facility-Administered Medications   Medication Dose Route Frequency Provider Last Rate Last Admin   • acetaminophen (TYLENOL) tablet 650 mg  650 mg oral q4h PRN Debbie Whitfield MD   650 mg at 12/25/20 2130    Or   • acetaminophen (TYLENOL) suppository 650 mg  650 mg rectal q4h PRN Debbie Whitfield MD        Or   • acetaminophen (TYLENOL) 650 mg/20.3 mL solution 650 mg  650 mg oral q4h PRN Debbie Whitfield MD       • albuterol HFA (VENTOLIN HFA) 90 mcg/actuation inhaler 2 puff  2 puff inhalation q6h PRN Debbie Whitfield MD       • albuterol HFA (VENTOLIN HFA) 90 mcg/actuation inhaler 2 puff  2 puff inhalation q6h PRN Debbie Whitfield MD   2 puff at 12/27/20 0521   • aspirin enteric coated tablet 81 mg  81 mg oral Daily Debbie Whitfield MD   81 mg at 12/27/20 0842   • atorvastatin (LIPITOR) tablet 10 mg  10 mg oral Daily (6p) Debbie Whitfield MD   10 mg at 12/26/20 1724   • dexamethasone (DECADRON) injection 6 mg  6 mg intravenous Daily Debbie Whitfield MD   6 mg at 12/27/20 0843   • glucose chewable tablet 16-32 g of dextrose  16-32 g of dextrose oral PRN Debbie Whitifeld MD        Or   • dextrose 40 % oral gel 15-30 g of dextrose  15-30 g of dextrose oral PRN Debbie Whitfield MD        Or   • glucagon (GLUCAGEN) injection 1 mg  1 mg intramuscular PRN Debbie Whitfield MD        Or   • dextrose in water injection 12.5 g  25 mL intravenous PRN Debbie Whitfield MD       • glucose chewable tablet 16-32 g of dextrose  16-32 g of dextrose oral PRN Debbie Whitfield MD        Or   • dextrose 40 % oral gel 15-30 g of dextrose  15-30 g of dextrose oral PRN Debbie Whitfield MD        Or   • glucagon (GLUCAGEN) injection 1 mg  1  mg intramuscular PRN Debbie Whitfield MD        Or   • dextrose in water injection 12.5 g  25 mL intravenous PRN Debbie Whitfield MD       • enoxaparin (LOVENOX) syringe 40 mg  0.5 mg/kg subcutaneous q12h (6a, 6p) Salinas Villalta DO       • melatonin capsule 5 mg  5 mg oral Nightly PRN Debbie Whitfield MD   5 mg at 12/25/20 2319   • multivitamin tablet 1 tablet  1 tablet oral Daily Debbie Whitfield MD   1 tablet at 12/27/20 0843   • ondansetron (ZOFRAN) injection 4 mg  4 mg intravenous q8h PRN Debbie Whitfield MD           Review of Systems  Negative unless discussed above.    OBJECTIVE:    Patient Vitals for the past 24 hrs:   BP Temp Temp src Pulse Resp SpO2   12/27/20 1200 118/67 36.6 °C (97.9 °F) Oral (!) 56 18 97 %   12/27/20 1125 -- -- -- -- 20 99 %   12/27/20 0834 -- -- -- -- -- 95 %   12/27/20 0800 (!) 109/59 -- -- 61 19 (!) 89 %   12/27/20 0427 102/63 36.8 °C (98.2 °F) Oral 61 18 95 %   12/27/20 0401 -- -- -- -- -- 95 %   12/27/20 0356 -- -- -- (!) 55 -- --   12/27/20 0352 -- -- -- -- -- 93 %   12/27/20 0300 -- -- -- -- -- 92 %   12/27/20 0255 -- -- -- -- -- (!) 91 %   12/27/20 0249 -- -- -- -- -- (!) 90 %   12/27/20 0247 -- -- -- -- -- (!) 88 %   12/27/20 0245 -- -- -- -- -- (!) 88 %   12/27/20 0057 -- -- -- -- -- 94 %   12/27/20 0000 -- -- -- (!) 58 -- --   12/26/20 2313 (!) 99/50 36.8 °C (98.2 °F) Oral (!) 57 16 93 %   12/26/20 2242 -- -- -- -- -- 93 %   12/26/20 2230 -- -- -- -- -- (!) 90 %   12/26/20 2120 -- -- -- -- -- 93 %   12/26/20 2003 113/67 37.1 °C (98.7 °F) Oral 65 18 (!) 91 %   12/26/20 1938 -- -- -- 63 -- --   12/26/20 1630 -- -- -- -- -- 92 %   12/26/20 1600 106/67 37.4 °C (99.3 °F) Oral 67 19 (!) 89 %     Oxygen Requirement: HHFNC 70% w/ 40L    Physical Exam:  Nursing note and vitals reviewed.   General: NAD, comfortable on HHFNC  HEENT: Moist membranes, PERRL   Neck: Supple, no JVD, no tug or stridor, trach midline  Cardiac: RRR, +S1/S2, no murmur appreciated  Lungs: Clear lungs bilaterally  with BLL rales. Respirations not labored  Abdomen: Soft, NT/ND, +BS, no rebound/guarding   : Deferred  Extremities: No edema, clubbing or cyanosis  Musculoskeletal: No joint deformity  Vascular: No ischemia noted  Neuro: AAOx3, nonfocal  Skin: Warm, limited exam, defer to attending/nursing  Psych: Cooperative, appropriate      Tele/EKG:   SR at 60bpm    Labs:  CMP Results       12/27/20 12/26/20 12/25/20                    0432 0411 0501          132 130            130         K 4.0 3.8 3.6            3.6         Cl 99 98 98            98         CO2 25 24 21            21         Glucose 136 137 126            126         BUN 23 21 17            17         Creatinine 0.7 0.7 0.7            0.7         Calcium 8.1 8.3 8.0            8.0         Anion Gap 8 10 11            11          89 80          63 50         Albumin 2.8 2.9 3.0         EGFR >60.0 >60.0 >60.0            >60.0                     CBC Results       12/27/20 12/26/20 12/25/20                    0432 0410 0501         WBC 3.88 3.46 1.44         RBC 3.69 3.95 3.82         HGB 11.3 12.0 11.6         HCT 31.9 34.4 32.9         MCV 86.4 87.1 86.1         MCH 30.6 30.4 30.4         MCHC 35.4 34.9 35.3         PLT  212 88         Comment for WBC at 0410 on 12/26/20: RESULTS CHECKED    Comment for WBC at 0501 on 12/25/20: RESULTS CHECKED. This result has been called to KARO CHAN by Brianna Boyd on 12 25 20 at 07:09, and has been read back.     Comment for PLT at 0432 on 12/27/20: Platelet clumps still present after vortexing to eliminate platelet clumps. Unable to accurately estimate. Order PLATELET(CITRATE TUBE) and redraw if needed.    Comment for PLT at 0410 on 12/26/20: RESULTS CHECKED        PT PTT Results       12/25/20 12/22/20                       0001 1124          PT 14.0 14.3          INR 1.1 1.1          PTT 37 32          Comment for INR at 0001 on 12/25/20: INR has no defined significance when PT is within  Reference Range.    Comment for INR at 1124 on 12/22/20: INR has no defined significance when PT is within Reference Range.    Comment for PTT at 0001 on 12/25/20: The Standard Therapeutic Range for Heparin is 68 to 101 seconds.        Lab Results   Component Value Date    CKTOTAL 209 12/26/2020    TROPONINI 0.02 12/24/2020     Lab Results   Component Value Date    MG 2.0 12/25/2020    CALCIUM 8.1 (L) 12/27/2020    PHOS 2.6 12/25/2020       Imaging/Radiology:  I have reviewed pertinent radiological studies.     Ct Chest w/ PE protocol (12/26):   1.  No CT evidence of pulmonary embolus.  2.  Findings in the chest consistent with known history of Covid pneumonia.    Chest X-Ray (12/27):   Progressive patchy airspace opacities are noted bilaterally.       IMPRESSION AND PLAN :    Acute Hypoxic Respiratory Failure  - Not O2 dependent at baseline.  Oxygen requirements since secondary to COVID infection   - Continue supplemental oxygen w/ HHFNC to maintain SpO2 90-96%  - Titrate FiO2 to maintain SpO2 90-96% and/or PaO2 >=60mmHg.  Will tolerate sats in the mid-80s or better  -- Mortality of intubation incredibly high. Therefore high threshold for intubation.   - Repeat ABG and CXR prn.   - Encourage modified proning and incentive spirometry encouraged    Coronavirus (COVID-19) Infection  - PCR positive on 12/14.   - Maintain contact and droplet precautions  - Monitor inflammatory markers intermittently  -  Given oxygen requirements, continue 6mg IV decadron daily for 10 days total or until discharge.   - ID following. Supportive care  - DVT ppx w/ LMWH 0.5mg/kg BID   - Albuterol HFA PRN   - Outpatient follow up imaging in 6-8 weeks to ensure resolution of infiltrates.    Former Smoker  - 30 pack year history. Quit 26 years ago    -- Additional history per EMR/attending      Code status: Full code  Case d/w: patient, RN, Dr. Villalta, Dr. Anthony

## 2020-12-27 NOTE — PROGRESS NOTES
Infectious Disease Progress Note    Patient Name: Nadeem Morales  MR#: [id number]  : 1955  Admission Date: 2020  Date: 20   Time: 12:51 PM   Author: QI Srinivasan    Major Events:     Antibiotics:        Subjective   Patient states he is not having any pain, states he is feeling a less shortness of breath with the high flow NC, admits to mild cough, tolerating diet and reports his appetite is good    Review of Systems    Pertinent items are noted in HPI.    Objective     Vital Signs:    Patient Vitals for the past 72 hrs:   BP Temp Temp src Pulse Resp SpO2 Height Weight   20 1200 118/67 36.6 °C (97.9 °F) Oral (!) 56 18 97 % -- --   20 1125 -- -- -- -- 20 99 % -- --   20 0834 -- -- -- -- -- 95 % -- --   20 0800 (!) 109/59 -- -- 61 19 (!) 89 % -- --   20 0427 102/63 36.8 °C (98.2 °F) Oral 61 18 95 % -- --   20 0401 -- -- -- -- -- 95 % -- --   20 0356 -- -- -- (!) 55 -- -- -- --   20 0352 -- -- -- -- -- 93 % -- --   20 0300 -- -- -- -- -- 92 % -- --   20 0255 -- -- -- -- -- (!) 91 % -- --   20 0249 -- -- -- -- -- (!) 90 % -- --   20 0247 -- -- -- -- -- (!) 88 % -- --   20 0245 -- -- -- -- -- (!) 88 % -- --   20 0057 -- -- -- -- -- 94 % -- --   20 0000 -- -- -- (!) 58 -- -- -- --   20 2313 (!) 99/50 36.8 °C (98.2 °F) Oral (!) 57 16 93 % -- --   20 -- -- -- -- -- 93 % -- --   20 -- -- -- -- -- (!) 90 % -- --   20 -- -- -- -- -- 93 % -- --   20 113/67 37.1 °C (98.7 °F) Oral 65 18 (!) 91 % -- --   20 1938 -- -- -- 63 -- -- -- --   20 1630 -- -- -- -- -- 92 % -- --   20 106/67 37.4 °C (99.3 °F) Oral 67 19 (!) 89 % -- --   20 1200 110/61 37.8 °C (100 °F) Oral 69 17 92 % -- --   20 0800 109/63 36.9 °C (98.5 °F) Oral 70 18 92 % -- --   20 0500 -- -- -- -- -- 93 % -- --   20 0400 104/63 37.2 °C (99 °F) Oral 70  "20 -- -- --   20 0000 101/61 37.1 °C (98.7 °F) Oral 71 20 94 % -- --   209 -- -- -- -- -- 93 % -- --   20 2100 113/62 (!) 38.3 °C (101 °F) Oral 73 20 (!) 89 % -- --   20 1600 113/68 36.7 °C (98.1 °F) Oral 72 18 94 % -- --   20 1200 107/64 37 °C (98.6 °F) Oral 65 18 94 % -- --   20 0800 110/61 36.9 °C (98.4 °F) Oral (!) 59 18 95 % -- --   20 0516 -- (!) 38.7 °C (101.6 °F) -- -- -- -- -- --   20 0500 113/62 (!) 38.7 °C (101.6 °F) Oral 65 18 94 % -- --   20 0400 -- -- -- 65 -- -- -- --   20 0100 -- -- -- 65 -- -- -- --   20 0040 123/73 37.7 °C (99.9 °F) Oral -- 20 94 % 1.753 m (5' 9.02\") 76.7 kg (169 lb 1.5 oz)   20 120/70 (!) 38.1 °C (100.6 °F) Oral 73 (!) 21 93 % -- --   20 113/77 (!) 39.7 °C (103.5 °F) Oral 78 (!) 29 95 % 1.753 m (5' 9\") 76.7 kg (169 lb 3.2 oz)       Temp (72hrs), Av.5 °C (99.5 °F), Min:36.6 °C (97.9 °F), Max:39.7 °C (103.5 °F)      Physical Exam:    Visit Vitals  /67 (BP Location: Right upper arm, Patient Position: Lying)   Pulse (!) 56   Temp 36.6 °C (97.9 °F) (Oral)   Resp 18   Ht 1.753 m (5' 9.02\")   Wt 76.7 kg (169 lb 1.5 oz)   SpO2 97%   BMI 24.96 kg/m²     General appearance: alert, appears stated age, cooperative and no distress  Eyes: anicteric  Lungs: diminished breath sounds bilateral, nonlabored, no cough observed  Abdomen: soft, ND, NT  Neurologic: Mental status: Alert, oriented, thought content appropriate    Lines, Drains, Airways, Wounds:  Peripheral IV 20 Left Antecubital (Active)   Number of days: 3       Labs:    CBC Results       20                    0432 0410 0501         WBC 3.88 3.46 1.44         RBC 3.69 3.95 3.82         HGB 11.3 12.0 11.6         HCT 31.9 34.4 32.9         MCV 86.4 87.1 86.1         MCH 30.6 30.4 30.4         MCHC 35.4 34.9 35.3         PLT  212 88         Comment for WBC at 0410 on 20: RESULTS CHECKED    Comment for WBC " at 0501 on 12/25/20: RESULTS CHECKED. This result has been called to KARO CHAN by Brianna Boyd on 12 25 20 at 07:09, and has been read back.     Comment for PLT at 0432 on 12/27/20: Platelet clumps still present after vortexing to eliminate platelet clumps. Unable to accurately estimate. Order PLATELET(CITRATE TUBE) and redraw if needed.    Comment for PLT at 0410 on 12/26/20: RESULTS CHECKED      BMP Results       12/27/20 12/26/20 12/25/20                    0432 0411 0501          132 130            130         K 4.0 3.8 3.6            3.6         Cl 99 98 98            98         CO2 25 24 21            21         Glucose 136 137 126            126         BUN 23 21 17            17         Creatinine 0.7 0.7 0.7            0.7         Calcium 8.1 8.3 8.0            8.0         Anion Gap 8 10 11            11         EGFR >60.0 >60.0 >60.0            >60.0                   PT/PTT Results       12/25/20 12/22/20                       0001 1124          PT 14.0 14.3          INR 1.1 1.1          PTT 37 32          Comment for INR at 0001 on 12/25/20: INR has no defined significance when PT is within Reference Range.    Comment for INR at 1124 on 12/22/20: INR has no defined significance when PT is within Reference Range.    Comment for PTT at 0001 on 12/25/20: The Standard Therapeutic Range for Heparin is 68 to 101 seconds.      UA Results       12/24/20                          2300           Color Yellow           Clarity Clear           Glucose Negative           Bilirubin Negative           Ketones +1           Sp Grav 1.023           Blood Negative           Ph 6.5           Protein +2           Urobilinogen 1.0           Nitrite Negative           Leuk Est Negative           WBC 0 TO 3           RBC 0 TO 4           Bacteria None Seen           Comment for Ketones at 2300 on 12/24/20: Free sulfhydryl drugs such as Mesna, Capoten, and Acetylcysteine (Mucomyst) may cause false positive  ketonuria.    Comment for Blood at 2300 on 12/24/20: The sensitivity of the occult blood test is equivalent to approximately 4 intact RBC/HPF.    Comment for Leuk Est at 2300 on 12/24/20: Results can be falsely negative due to high specific gravity, some antibiotics, glucose >3 g/dl, or WBC other than neutrophils.      Lactate Results       12/24/20 2042           Lactate 1.2                           Microbiology Results     Procedure Component Value Units Date/Time    Blood Culture Blood, Venous [[id number]] Collected: 12/24/20 2042    Specimen: Blood, Venous Updated: 12/26/20 2301     Culture No growth at 48 hours    Blood Culture Blood, Venous [[id number]] Collected: 12/24/20 2042    Specimen: Blood, Venous Updated: 12/26/20 2301     Culture No growth at 48 hours          Pathology Results     ** No results found for the last 720 hours. **          Echo:          Imaging:    Radiology Imaging   XR CHEST 1 VW    Narrative CLINICAL HISTORY: SOB    COMMENT: A single AP radiograph of the chest is obtained . Comparison is made to  prior exam of 12/22/2020.  Heart size is top normal.  There are patchy  groundglass densities noted more so in the left lung.  Patchy small right lower  lobe infiltrate.  No elba pleural effusions.  No pneumothorax.  Hilar and  mediastinal structures are normal.  Mild degenerative changes in the thoracic  spine.      Impression IMPRESSION:  Bilateral groundglass densities left greater than right with interval increase  since prior study.       Assessment     COVID 19 PNA with acute hypoxic respiratory failure  - on HHF NC  - afebrile  - no leukocytosis, does have leukopenia which is improving, no neutropenia  - on steroids  - symptoms began > 14 days ago so no perceived benefit from remdesivir at this time  - blood sterile    - new CXR today is pending           Plan     Continue steroids and supportive care, I reviewed awake modified proning with the patient and he  verbalizes understanding, will discuss with Dr Hammond

## 2020-12-27 NOTE — PROGRESS NOTES
Hospital Medicine Service -  Daily Progress Note       SUBJECTIVE   Interval History: he was doing poorly this morning so a HHFNC was placed, by the time I saw him he reports that he was breathing more easily with the new oxygen. Continues to have shortness of breath. No fevers, chills, nausea vomiting.     OBJECTIVE      Vital signs in last 24 hours:  Temp:  [36.8 °C (98.2 °F)-37.8 °C (100 °F)] 36.8 °C (98.2 °F)  Heart Rate:  [55-69] 61  Resp:  [16-19] 19  BP: ()/(50-67) 109/59  FiO2 (%) (Set):  [90 %] 90 %    Intake/Output Summary (Last 24 hours) at 12/27/2020 1150  Last data filed at 12/26/2020 2200  Gross per 24 hour   Intake --   Output 650 ml   Net -650 ml       PHYSICAL EXAMINATION      Physical Exam  Vitals signs and nursing note reviewed.   Constitutional:       Appearance: Normal appearance. He is well-developed.   HENT:      Head: Normocephalic and atraumatic.      Mouth/Throat:      Mouth: Mucous membranes are moist.      Pharynx: Oropharynx is clear.   Eyes:      Extraocular Movements: Extraocular movements intact.      Pupils: Pupils are equal, round, and reactive to light.   Neck:      Musculoskeletal: Normal range of motion and neck supple.   Cardiovascular:      Rate and Rhythm: Normal rate and regular rhythm.      Heart sounds: Normal heart sounds.   Pulmonary:      Effort: Respiratory distress present.      Breath sounds: Normal breath sounds.   Abdominal:      General: Abdomen is flat. Bowel sounds are normal.      Palpations: Abdomen is soft.   Musculoskeletal: Normal range of motion.   Skin:     General: Skin is warm and dry.   Neurological:      General: No focal deficit present.      Mental Status: He is alert and oriented to person, place, and time. Mental status is at baseline.   Psychiatric:         Mood and Affect: Mood normal.         Behavior: Behavior normal.         Thought Content: Thought content normal.         Judgment: Judgment normal.        LINES, CATHETERS, DRAINS,  AIRWAYS, AND WOUNDS   Lines, Drains, Airways, Wounds:  Peripheral IV 12/24/20 Left Antecubital (Active)   Number of days: 3       Comments:      LABS / IMAGING / TELE      Labs  I have reviewed the patient's pertinent labs.  Significant abnormals are Na 132, AST//132.    Imaging  Not applicable    ECG/Telemetry  I have independently reviewed the telemetry. No events for the last 24 hours.    ASSESSMENT AND PLAN      Acute respiratory failure with hypoxia (CMS/HCC)  Assessment & Plan  Due to COVID-19  Prone protocol  Acutely worsening over the last 24h, from 5 L NC to HHFNC  Pulm consulted  Will check CXR    Pancytopenia (CMS/HCC)  Assessment & Plan  Likely related to acute infection  Monitor CBC w/daily labs  Consult heme/onc, signed off now that numbers are improving  Improving daily    Acute hyponatremia  Assessment & Plan  Likely hopovlemia  Serum sodium=126  IVF initiated in ER, will continue  Trend sodium level  Improving appropriately on IVF  Consider nephrology consult if no improvement overnight  Encourage PO intake  Continues to slowly improve    Coronary artery disease involving native coronary artery of native heart without angina pectoris  Assessment & Plan  Chronic, asymptomatic  Continue Lipitor, ASA    * Pneumonia due to COVID-19 virus  Assessment & Plan  Out patient COVID-19 test positive on 12/16, he had symptoms for a few days before that but he did not realize it was COVID  Continues to have limited taste/smell  Tried to stay home but O2 sats on home monitor were 84 prompting return to ER  CXR result as above  Continue contact/droplet isolation  Supplemental O2 (PRN) + Ventolin HFA (PRN)  Continue decadron 6 mg for 10 days   Not a candidate for Remdesivir due to how long ago symptoms started  ID consulted  Increase lovenox to BID dosing  Defer to ID regarding other potential treatment options  AST/ALT getting worse       VTE Assessment: Padua VTE Score: 1  VTE Prophylaxis Plan: Lovenox 0.5  mg/kg sq BID  Code Status: Full Code  Estimated Discharge Date: 12/29/2020  Disposition Planning: Pending response to treatment     Salinas Villalta DO  12/27/2020

## 2020-12-28 LAB
ALBUMIN SERPL-MCNC: 2.8 G/DL (ref 3.4–5)
ALP SERPL-CCNC: 42 IU/L (ref 35–126)
ALT SERPL-CCNC: 120 IU/L (ref 16–63)
ANION GAP SERPL CALC-SCNC: 9 MEQ/L (ref 3–15)
AST SERPL-CCNC: 87 IU/L (ref 15–41)
BASOPHILS # BLD: 0 K/UL (ref 0.01–0.1)
BASOPHILS NFR BLD: 0 %
BILIRUB SERPL-MCNC: 0.9 MG/DL (ref 0.3–1.2)
BUN SERPL-MCNC: 21 MG/DL (ref 8–20)
CALCIUM SERPL-MCNC: 8.1 MG/DL (ref 8.9–10.3)
CHLORIDE SERPL-SCNC: 100 MEQ/L (ref 98–109)
CK SERPL-CCNC: 83 U/L (ref 16–300)
CO2 SERPL-SCNC: 25 MEQ/L (ref 22–32)
CREAT SERPL-MCNC: 0.7 MG/DL (ref 0.8–1.3)
CRP SERPL-MCNC: 28.1 MG/L
DIFFERENTIAL METHOD BLD: ABNORMAL
EOSINOPHIL # BLD: 0 K/UL (ref 0.04–0.54)
EOSINOPHIL NFR BLD: 0 %
ERYTHROCYTE [DISTWIDTH] IN BLOOD BY AUTOMATED COUNT: 13 % (ref 11.6–14.4)
FERRITIN SERPL-MCNC: 2127 NG/ML (ref 24–250)
GFR SERPL CREATININE-BSD FRML MDRD: >60 ML/MIN/1.73M*2
GLUCOSE SERPL-MCNC: 128 MG/DL (ref 70–99)
HCT VFR BLDCO AUTO: 33.3 % (ref 40.1–51)
HGB BLD-MCNC: 11.1 G/DL (ref 13.7–17.5)
LDH SERPL L TO P-CCNC: 541 IU/L (ref 98–192)
LYMPHOCYTES # BLD: 0.89 K/UL (ref 1.2–3.5)
LYMPHOCYTES NFR BLD: 23 %
MCH RBC QN AUTO: 29.5 PG (ref 28–33.2)
MCHC RBC AUTO-ENTMCNC: 33.3 G/DL (ref 32.2–36.5)
MCV RBC AUTO: 88.6 FL (ref 83–98)
MONOCYTES # BLD: 0.19 K/UL (ref 0.3–1)
MONOCYTES NFR BLD: 5 %
NEUTS BAND # BLD: 0.04 K/UL (ref 0–0.53)
NEUTS BAND # BLD: 2.73 K/UL (ref 1.7–7)
NEUTS BAND NFR BLD: 1 %
NEUTS SEG NFR BLD: 71 %
PDW BLD AUTO: 12.2 FL (ref 9.4–12.4)
PLATELET # BLD AUTO: 129 K/UL (ref 150–350)
PLATELET # BLD EST: ABNORMAL 10*3/UL
POTASSIUM SERPL-SCNC: 4.2 MEQ/L (ref 3.6–5.1)
PROT SERPL-MCNC: 5.9 G/DL (ref 6–8.2)
RBC # BLD AUTO: 3.76 M/UL (ref 4.5–5.8)
RBC MORPH BLD: NORMAL
SODIUM SERPL-SCNC: 134 MEQ/L (ref 136–144)
WBC # BLD AUTO: 3.85 K/UL (ref 3.8–10.5)

## 2020-12-28 PROCEDURE — 82550 ASSAY OF CK (CPK): CPT | Performed by: HOSPITALIST

## 2020-12-28 PROCEDURE — 85025 COMPLETE CBC W/AUTO DIFF WBC: CPT | Performed by: HOSPITALIST

## 2020-12-28 PROCEDURE — 83615 LACTATE (LD) (LDH) ENZYME: CPT | Performed by: HOSPITALIST

## 2020-12-28 PROCEDURE — 27900059 OXYGEN DAILY

## 2020-12-28 PROCEDURE — 36415 COLL VENOUS BLD VENIPUNCTURE: CPT | Performed by: HOSPITALIST

## 2020-12-28 PROCEDURE — 80053 COMPREHEN METABOLIC PANEL: CPT | Performed by: HOSPITALIST

## 2020-12-28 PROCEDURE — 63600000 HC DRUGS/DETAIL CODE: Performed by: INTERNAL MEDICINE

## 2020-12-28 PROCEDURE — 63700000 HC SELF-ADMINISTRABLE DRUG: Performed by: HOSPITALIST

## 2020-12-28 PROCEDURE — 82728 ASSAY OF FERRITIN: CPT | Performed by: HOSPITALIST

## 2020-12-28 PROCEDURE — 99233 SBSQ HOSP IP/OBS HIGH 50: CPT | Mod: CR | Performed by: INTERNAL MEDICINE

## 2020-12-28 PROCEDURE — 63600000 HC DRUGS/DETAIL CODE: Performed by: HOSPITALIST

## 2020-12-28 PROCEDURE — 12000000 HC ROOM AND CARE MED/SURG

## 2020-12-28 PROCEDURE — 86140 C-REACTIVE PROTEIN: CPT | Performed by: HOSPITALIST

## 2020-12-28 RX ADMIN — THERA TABS 1 TABLET: TAB at 08:13

## 2020-12-28 RX ADMIN — ENOXAPARIN SODIUM 40 MG: 40 INJECTION SUBCUTANEOUS at 17:04

## 2020-12-28 RX ADMIN — DEXAMETHASONE SODIUM PHOSPHATE: 4 INJECTION, SOLUTION INTRA-ARTICULAR; INTRALESIONAL; INTRAMUSCULAR; INTRAVENOUS; SOFT TISSUE at 08:13

## 2020-12-28 RX ADMIN — ASPIRIN 81 MG: 81 TABLET, COATED ORAL at 08:13

## 2020-12-28 RX ADMIN — ENOXAPARIN SODIUM 40 MG: 40 INJECTION SUBCUTANEOUS at 05:03

## 2020-12-28 RX ADMIN — ATORVASTATIN CALCIUM 10 MG: 10 TABLET, FILM COATED ORAL at 17:04

## 2020-12-28 ASSESSMENT — ENCOUNTER SYMPTOMS
FATIGUE: 1
COUGH: 1
SHORTNESS OF BREATH: 1

## 2020-12-28 NOTE — PATIENT CARE CONFERENCE
Care Progression Rounds Note  Date: 12/28/2020  Time: 10:18 AM     Patient Name: Nadeem Morales     Medical Record Number: [id number]   YOB: 1955  Sex: Male      Room/Bed: 4507W    Admitting Diagnosis: Shortness of breath [R06.02]  Hypoxia [R09.02]  COVID-19 virus infection [U07.1]  Pneumonia due to COVID-19 virus [U07.1, J12.89]   Admit Date/Time: 12/24/2020  8:28 PM    Primary Diagnosis: Pneumonia due to COVID-19 virus  Principal Problem: Pneumonia due to COVID-19 virus    GMLOS: 5.4  Anticipated Discharge Date: 12/29/2020    AM-PAC  Mobility Score:      Discharge Planning:  Living Arrangements: house  Anticipated Discharge Disposition: home without services    Barriers to Discharge:  Barriers to Discharge: Medical issues not resolved  [facility]    Participants:  nursing, , social work/services

## 2020-12-28 NOTE — PROGRESS NOTES
Hospital Medicine Service -  Daily Progress Note       SUBJECTIVE   Interval History: Patient complains of shortness of breath.  States that he has improved significantly over the last 4 days since admission but still has dyspnea on exertion     OBJECTIVE      Vital signs in last 24 hours:  Temp:  [36.6 °C (97.8 °F)-36.9 °C (98.4 °F)] 36.6 °C (97.8 °F)  Heart Rate:  [44-73] 73  Resp:  [18-22] 18  BP: (105-118)/(58-67) 112/60  FiO2 (%) (Set):  [65 %-70 %] 65 %  No intake or output data in the 24 hours ending 12/28/20 1000    PHYSICAL EXAMINATION      Constitutional : appears in mild acute distress  Eyes : Extraocular movements are intact, pupils are equal in size and reactive to light bilaterally.    ENMT: JVD is not enlarged.  No lesions on oral mucosa.  Head is atraumatic  Respiratory : Few bilateral rhonchi to auscultation bilaterally  Cardiovascular : Normal first and second heart sounds.  No heart murmurs  GI : Soft, nontender.  There is no organomegaly.  Bowel sounds are normal  Musculoskeletal : Normal range of motion  Extremity: There is no cyanosis, clubbing or edema.  Distal pulses are 2+ bilateral dorsalis pedis  Skin: Warm to touch.  No rashes, no ulcers  Psychiatry: Patient is cooperative, follows commands  Neurological: Awake alert and oriented x 3.  Motor strength is equal bilateral upper and lower extremities.  Sensations are intact bilaterally. CN 2-12 grossly intact     LABS / IMAGING / TELE      Labs  Lab Results   Component Value Date    WBC 3.85 12/28/2020    HGB 11.1 (L) 12/28/2020    HCT 33.3 (L) 12/28/2020    MCV 88.6 12/28/2020     (L) 12/28/2020     Lab Results   Component Value Date    GLUCOSE 128 (H) 12/28/2020    CALCIUM 8.1 (L) 12/28/2020     (L) 12/28/2020    K 4.2 12/28/2020    CO2 25 12/28/2020     12/28/2020    BUN 21 (H) 12/28/2020    CREATININE 0.7 (L) 12/28/2020         Results from last 7 days   Lab Units 12/25/20  0001   MAGNESIUM mg/dL 2.0         Results  from last 7 days   Lab Units 12/28/20  0456 12/27/20  0432 12/26/20  0411   ALBUMIN g/dL 2.8* 2.8* 2.9*                 Results from last 7 days   Lab Units 12/28/20  0456 12/27/20  0432 12/26/20  0411   SODIUM mEQ/L 134* 132* 132*   POTASSIUM mEQ/L 4.2 4.0 3.8   CHLORIDE mEQ/L 100 99 98   CO2 mEQ/L 25 25 24   BUN mg/dL 21* 23* 21*   CREATININE mg/dL 0.7* 0.7* 0.7*   CALCIUM mg/dL 8.1* 8.1* 8.3*   ALBUMIN g/dL 2.8* 2.8* 2.9*   BILIRUBIN TOTAL mg/dL 0.9 1.0 1.0   ALK PHOS IU/L 42 37 38   ALT IU/L 120* 132* 63   AST IU/L 87* 141* 89*   GLUCOSE mg/dL 128* 136* 137*             Results from last 7 days   Lab Units 12/25/20  0001 12/22/20  1124   INR INR 1.1 1.1   PTT sec 37* 32     Results from last 7 days   Lab Units 12/28/20  0456 12/27/20  0432 12/26/20  0411   ALK PHOS IU/L 42 37 38   BILIRUBIN TOTAL mg/dL 0.9 1.0 1.0   ALBUMIN g/dL 2.8* 2.8* 2.9*   ALT IU/L 120* 132* 63   AST IU/L 87* 141* 89*     No results found for: TROPONINT          Results from last 7 days   Lab Units 12/26/20  0411 12/25/20  0001   FERRITIN ng/mL >1,500* 2,323*       Imaging  Significant findings include: Chest x-ray progressive patchy airspace opacities noted bilaterally      ECG/Telemetry  I have independently reviewed the telemetry. Significant findings include sinus haydee rate 57.  LINES, CATHETERS, DRAINS, AIRWAYS, AND WOUNDS   Lines, Drains, Airways, Wounds:  Peripheral IV 12/24/20 Left Antecubital (Active)   Number of days: 4       Comments:          ASSESSMENT AND PLAN      * Pneumonia due to COVID-19 virus  Assessment & Plan  Covid pneumonia with acute hypoxic respiratory failure  Chest x-ray with progressive patchy airspace opacities noted bilaterally  Continue Decadron  Not a candidate for remdesivir as symptoms started 13 days prior to admission  Infectious disease following  Monitor oxygen requirements       Acute respiratory failure with hypoxia (CMS/HCC)  Assessment & Plan  Due to COVID-19  Prone protocol  Chest x-ray with  progressive patchy airspace opacities  Pulmonary, infectious disease following    Pancytopenia (CMS/HCC)  Assessment & Plan  Likely related to acute infection  Monitor CBC w/daily labs  Consult heme/onc, signed off now that numbers are improving  Improving daily    Acute hyponatremia  Assessment & Plan  Hypovolemic hyponatremia resolved  Monitor sodium levels  Encourage p.o. intake    Coronary artery disease involving native coronary artery of native heart without angina pectoris  Assessment & Plan  Chronic, asymptomatic  Continue Lipitor, ASA       VTE Assessment: Padua VTE Score: 1  VTE Prophylaxis Plan: lovenox  Code Status: Full Code  Estimated Discharge Date: 12/29/2020  Disposition Planning: Monitor oxygen requirements.     Reagan Haque MD  12/28/2020

## 2020-12-28 NOTE — CONSULTS
Wound Care Consult     Subjective lola Morales is a 65 y.o. male who was admitted for Shortness of breath [R06.02]  Hypoxia [R09.02]  COVID-19 virus infection [U07.1]  Pneumonia due to COVID-19 virus [U07.1, J12.89]. Patient was seen in consultation at the request of referring physician for management recommendations. Patient is alert he is wearing high flow oxygen and mask, ears at risk for pressure injuries    Medical History:   Past Medical History:   Diagnosis Date   • Coronary artery disease     Hx if acute MI, s/p stent placement   • Lipid disorder        Surgical History:   Past Surgical History:   Procedure Laterality Date   • HERNIA REPAIR     • TONSILLECTOMY         Social History:   Social History     Social History Narrative   • Not on file       Family History:   Family History   Problem Relation Age of Onset   • Lymphoma Biological Mother    • Colon cancer Biological Father        Allergies: Patient has no known allergies.    Home Medications:  •  aspirin, Take 81 mg by mouth daily.  •  atorvastatin, Take 10 mg by mouth daily.  •  [] azithromycin, Take 2 tablets the first day, then 1 tablet daily for 4 days.  •  multivitamin, Take by mouth daily.    Current Medications:  •  acetaminophen, 650 mg, oral, q4h PRN **OR** acetaminophen, 650 mg, rectal, q4h PRN **OR** acetaminophen, 650 mg, oral, q4h PRN  •  albuterol HFA, 2 puff, inhalation, q6h PRN  •  aspirin, 81 mg, oral, Daily  •  atorvastatin, 10 mg, oral, Daily (6p)  •  dexamethasone, 6 mg, intravenous, Daily  •  glucose, 16-32 g of dextrose, oral, PRN **OR** dextrose, 15-30 g of dextrose, oral, PRN **OR** glucagon, 1 mg, intramuscular, PRN **OR** dextrose in water, 25 mL, intravenous, PRN  •  glucose, 16-32 g of dextrose, oral, PRN **OR** dextrose, 15-30 g of dextrose, oral, PRN **OR** glucagon, 1 mg, intramuscular, PRN **OR** dextrose in water, 25 mL, intravenous, PRN  •  enoxaparin, 0.5 mg/kg, subcutaneous, q12h (6a, 6p)  •   "melatonin, 5 mg, oral, Nightly PRN  •  multivitamin, 1 tablet, oral, Daily  •  ondansetron, 4 mg, intravenous, q8h PRN    Review of Systems  Pertinent items are noted in HPI.    Objective     Physicial Exam  Visit Vitals  BP (!) 114/59 (BP Location: Right upper arm, Patient Position: Lying)   Pulse 66   Temp 36.4 °C (97.6 °F)   Resp 20   Ht 1.753 m (5' 9.02\")   Wt 76.7 kg (169 lb 1.5 oz)   SpO2 (!) 90%   BMI 24.96 kg/m²       General appearance: alert, appears stated age and cooperative  Head: normocephalic, without obvious abnormality, atraumatic    Ears: at risk with elastic band and tubing on mask    Lungs: no evidence of labored breathing    Abdomen: continent  Genitalia:continent  Rectal: deferred  Extremities: extremities normal, warm and well-perfused; no cyanosis, clubbing, or edema    Skin: Skin color, texture, turgor normal. No rashes or lesions       Neurologic: Mental status: Alert, oriented, thought content appropriate    Labs  I have reviewed the patient's labs.  Significant abnormals are crp trending down 28.10.    Imaging  Not applicable    Padding to ears to protect from tubing, discussed with RN and applied padding today      QI Reyna  12/28/2020  "

## 2020-12-28 NOTE — PROGRESS NOTES
" Pulmonary Daily Progress Note    SUBJECTIVE: Patient seen and examined at the bedside this morning.  Patient has improvement in oxygenation and is currently on 40 L/min FiO2 60%.  He continues to be on Decadron IV 6 mg.  Reports some dry cough and shortness of breath    Allergies:   Patient has no known allergies.    Review of Systems:  Review of Systems   Constitutional: Positive for fatigue.   Respiratory: Positive for cough and shortness of breath.    All other systems reviewed and are negative.      Input/Ouput in Last 24 hours:  No intake or output data in the 24 hours ending 12/28/20 1117    Objective     Vital Signs for the last 24 hours:  Visit Vitals  /60 (BP Location: Right upper arm, Patient Position: Lying)   Pulse 73   Temp 36.6 °C (97.8 °F)   Resp 18   Ht 1.753 m (5' 9.02\")   Wt 76.7 kg (169 lb 1.5 oz)   SpO2 92%   BMI 24.96 kg/m²     Oxygen Therapy: Supplemental oxygen    Physical Exam:  Physical Exam  Vitals signs reviewed.   Constitutional:       Appearance: Normal appearance.   HENT:      Nose:      Comments: On high flow nasal cannula     Mouth/Throat:      Mouth: Mucous membranes are moist.   Eyes:      Pupils: Pupils are equal, round, and reactive to light.   Neck:      Musculoskeletal: Normal range of motion and neck supple. No neck rigidity.   Cardiovascular:      Rate and Rhythm: Normal rate and regular rhythm.      Pulses: Normal pulses.      Heart sounds: No murmur. No gallop.    Pulmonary:      Effort: Pulmonary effort is normal.      Breath sounds: Normal breath sounds. No wheezing or rales.   Abdominal:      General: Abdomen is flat. Bowel sounds are normal.      Palpations: Abdomen is soft.   Musculoskeletal:      Right lower leg: No edema.      Left lower leg: No edema.   Lymphadenopathy:      Cervical: No cervical adenopathy.   Skin:     General: Skin is warm.      Capillary Refill: Capillary refill takes less than 2 seconds.   Neurological:      General: No focal deficit " present.      Mental Status: He is alert and oriented to person, place, and time. Mental status is at baseline.          LABS:  Results from last 7 days   Lab Units 12/28/20 0456 12/27/20 0432 12/26/20 0411 12/25/20  0001   SODIUM mEQ/L 134* 132* 132*   < >  --    CO2 mEQ/L 25 25 24   < >  --    BUN mg/dL 21* 23* 21*   < >  --    CALCIUM mg/dL 8.1* 8.1* 8.3*   < >  --    ALBUMIN g/dL 2.8* 2.8* 2.9*   < >  --    PHOSPHORUS mg/dL  --   --   --   --  2.6    < > = values in this interval not displayed.     Results from last 7 days   Lab Units 12/28/20  0456 12/27/20  0432 12/26/20  0410 12/25/20  0501   WBC K/uL 3.85 3.88 3.46* 1.44*   PLATELETS K/uL 129*  --  212 88*     Lab Results   Component Value Date    WBC 3.85 12/28/2020    HGB 11.1 (L) 12/28/2020    HCT 33.3 (L) 12/28/2020     (L) 12/28/2020     (H) 12/28/2020    AST 87 (H) 12/28/2020     (L) 12/28/2020    K 4.2 12/28/2020     12/28/2020    CREATININE 0.7 (L) 12/28/2020    BUN 21 (H) 12/28/2020    CO2 25 12/28/2020    INR 1.1 12/25/2020         Current Facility-Administered Medications:   •  acetaminophen (TYLENOL) tablet 650 mg, 650 mg, oral, q4h PRN, 650 mg at 12/25/20 2130 **OR** acetaminophen (TYLENOL) suppository 650 mg, 650 mg, rectal, q4h PRN **OR** acetaminophen (TYLENOL) 650 mg/20.3 mL solution 650 mg, 650 mg, oral, q4h PRN, Debbie Whitfield MD  •  albuterol HFA (VENTOLIN HFA) 90 mcg/actuation inhaler 2 puff, 2 puff, inhalation, q6h PRN, Debbie Whitfield MD  •  aspirin enteric coated tablet 81 mg, 81 mg, oral, Daily, Debbie Whitfield MD, 81 mg at 12/28/20 0813  •  atorvastatin (LIPITOR) tablet 10 mg, 10 mg, oral, Daily (6p), Debbie Whitfield MD, 10 mg at 12/27/20 1658  •  dexamethasone (DECADRON) injection 6 mg, 6 mg, intravenous, Daily, Debbie Whitfield MD, Given at 12/28/20 0813  •  glucose chewable tablet 16-32 g of dextrose, 16-32 g of dextrose, oral, PRN **OR** dextrose 40 % oral gel 15-30 g of dextrose, 15-30 g of  dextrose, oral, PRN **OR** glucagon (GLUCAGEN) injection 1 mg, 1 mg, intramuscular, PRN **OR** dextrose in water injection 12.5 g, 25 mL, intravenous, PRN, Debbie Whitfield MD  •  glucose chewable tablet 16-32 g of dextrose, 16-32 g of dextrose, oral, PRN **OR** dextrose 40 % oral gel 15-30 g of dextrose, 15-30 g of dextrose, oral, PRN **OR** glucagon (GLUCAGEN) injection 1 mg, 1 mg, intramuscular, PRN **OR** dextrose in water injection 12.5 g, 25 mL, intravenous, PRN, Debbie Whitfield MD  •  enoxaparin (LOVENOX) syringe 40 mg, 0.5 mg/kg, subcutaneous, q12h (6a, 6p), Pawan, [place], DO, 40 mg at 12/28/20 0503  •  melatonin capsule 5 mg, 5 mg, oral, Nightly PRN, Debbie Whitfield MD, 5 mg at 12/25/20 2319  •  multivitamin tablet 1 tablet, 1 tablet, oral, Daily, Debbie Whitfield MD, 1 tablet at 12/28/20 0813  •  ondansetron (ZOFRAN) injection 4 mg, 4 mg, intravenous, q8h PRN, Debbie Whitfield MD      Imaging/Radiology:  No radiological imaging available today    IMPRESSION AND PLAN :  65-year-old male admitted with hypoxic respiratory failure due to COVID-19 pneumonia currently being treated with dexamethasone and high flow nasal cannula at 40 L/min, FiO2 decreased from 70% to 60% currently saturating 92% not a candidate for remdesivir based on symptoms more than 14 days.    Problem list  COVID-19 pneumonia  Acute hypoxic respiratory failure  ARDS  Former smoker    Recommendations:  -Continue Decadron 6 mg IV day 5 of total of 10-day course.  -Continue high flow nasal cannula to keep saturation between 92 to 94% and alternate with noninvasive ventilation if needed  -Intermediate level of DVT prophylaxis  -Modified tresing advised  -We will hold off starting on LAMA at this point of time    Ron Anthony MD  Pulmonary and Critical Care

## 2020-12-28 NOTE — PLAN OF CARE
Problem: Adult Inpatient Plan of Care  Goal: Plan of Care Review  Flowsheets (Taken 12/28/2020 1202)  Outcome Summary:   Patient discussed in care progression rounds; requiring 40L HHFO2. IV decadron ordered. CC will continue to follow and assist w/ d/c planning.

## 2020-12-29 LAB
ALBUMIN SERPL-MCNC: 2.7 G/DL (ref 3.4–5)
ALP SERPL-CCNC: 39 IU/L (ref 35–126)
ALT SERPL-CCNC: 108 IU/L (ref 16–63)
ANION GAP SERPL CALC-SCNC: 9 MEQ/L (ref 3–15)
AST SERPL-CCNC: 61 IU/L (ref 15–41)
BACTERIA BLD CULT: NORMAL
BACTERIA BLD CULT: NORMAL
BASOPHILS # BLD: 0 K/UL (ref 0.01–0.1)
BASOPHILS NFR BLD: 0 %
BILIRUB SERPL-MCNC: 1.3 MG/DL (ref 0.3–1.2)
BUN SERPL-MCNC: 21 MG/DL (ref 8–20)
CALCIUM SERPL-MCNC: 8.1 MG/DL (ref 8.9–10.3)
CHLORIDE SERPL-SCNC: 103 MEQ/L (ref 98–109)
CO2 SERPL-SCNC: 23 MEQ/L (ref 22–32)
CREAT SERPL-MCNC: 0.7 MG/DL (ref 0.8–1.3)
DIFFERENTIAL METHOD BLD: ABNORMAL
EOSINOPHIL # BLD: 0 K/UL (ref 0.04–0.54)
EOSINOPHIL NFR BLD: 0 %
ERYTHROCYTE [DISTWIDTH] IN BLOOD BY AUTOMATED COUNT: 12.9 % (ref 11.6–14.4)
GFR SERPL CREATININE-BSD FRML MDRD: >60 ML/MIN/1.73M*2
GLUCOSE SERPL-MCNC: 100 MG/DL (ref 70–99)
HCT VFR BLDCO AUTO: 33.1 % (ref 40.1–51)
HGB BLD-MCNC: 11.3 G/DL (ref 13.7–17.5)
LYMPHOCYTES # BLD: 0.45 K/UL (ref 1.2–3.5)
LYMPHOCYTES NFR BLD: 9 %
MCH RBC QN AUTO: 30.1 PG (ref 28–33.2)
MCHC RBC AUTO-ENTMCNC: 34.1 G/DL (ref 32.2–36.5)
MCV RBC AUTO: 88.3 FL (ref 83–98)
MONOCYTES # BLD: 0.15 K/UL (ref 0.3–1)
MONOCYTES NFR BLD: 3 %
NEUTS BAND # BLD: 4.35 K/UL (ref 1.7–7)
NEUTS SEG NFR BLD: 87 %
PDW BLD AUTO: 11.5 FL (ref 9.4–12.4)
PLATELET # BLD AUTO: 150 K/UL (ref 150–350)
PLATELET # BLD EST: ABNORMAL 10*3/UL
PLATELET CLUMP BLD QL SMEAR: PRESENT
POTASSIUM SERPL-SCNC: 4.4 MEQ/L (ref 3.6–5.1)
PROT SERPL-MCNC: 6 G/DL (ref 6–8.2)
RBC # BLD AUTO: 3.75 M/UL (ref 4.5–5.8)
RBC MORPH BLD: NORMAL
SODIUM SERPL-SCNC: 135 MEQ/L (ref 136–144)
VARIANT LYMPHS # BLD MANUAL: 0.05 K/UL
VARIANT LYMPHS NFR BLD: 1 %
WBC # BLD AUTO: 5 K/UL (ref 3.8–10.5)

## 2020-12-29 PROCEDURE — 99233 SBSQ HOSP IP/OBS HIGH 50: CPT | Mod: CR | Performed by: INTERNAL MEDICINE

## 2020-12-29 PROCEDURE — 85025 COMPLETE CBC W/AUTO DIFF WBC: CPT | Performed by: HOSPITALIST

## 2020-12-29 PROCEDURE — 20600000 HC ROOM AND CARE INTERMEDIATE/TELEMETRY

## 2020-12-29 PROCEDURE — 63700000 HC SELF-ADMINISTRABLE DRUG: Performed by: HOSPITALIST

## 2020-12-29 PROCEDURE — 27900059 OXYGEN DAILY

## 2020-12-29 PROCEDURE — 80053 COMPREHEN METABOLIC PANEL: CPT | Performed by: HOSPITALIST

## 2020-12-29 PROCEDURE — 63600000 HC DRUGS/DETAIL CODE: Performed by: HOSPITALIST

## 2020-12-29 PROCEDURE — 63600000 HC DRUGS/DETAIL CODE: Performed by: INTERNAL MEDICINE

## 2020-12-29 RX ADMIN — ENOXAPARIN SODIUM 40 MG: 40 INJECTION SUBCUTANEOUS at 17:27

## 2020-12-29 RX ADMIN — THERA TABS 1 TABLET: TAB at 08:36

## 2020-12-29 RX ADMIN — DEXAMETHASONE SODIUM PHOSPHATE 6 MG: 4 INJECTION, SOLUTION INTRA-ARTICULAR; INTRALESIONAL; INTRAMUSCULAR; INTRAVENOUS; SOFT TISSUE at 08:36

## 2020-12-29 RX ADMIN — ASPIRIN 81 MG: 81 TABLET, COATED ORAL at 08:36

## 2020-12-29 RX ADMIN — ATORVASTATIN CALCIUM 10 MG: 10 TABLET, FILM COATED ORAL at 17:27

## 2020-12-29 RX ADMIN — ENOXAPARIN SODIUM 40 MG: 40 INJECTION SUBCUTANEOUS at 06:24

## 2020-12-29 ASSESSMENT — ENCOUNTER SYMPTOMS
COUGH: 0
FATIGUE: 1
SHORTNESS OF BREATH: 1

## 2020-12-29 NOTE — PROGRESS NOTES
Hospital Medicine Service -  Daily Progress Note       SUBJECTIVE   Interval History: Complaining of shortness of breath and generalized weakness.  Patient wants to know if he is a candidate for remdesivir and understands that because of his delayed presentation he is out of the window to get this medicine as discussed with infectious disease as well     OBJECTIVE      Vital signs in last 24 hours:  Temp:  [36.5 °C (97.7 °F)-37.1 °C (98.8 °F)] 36.8 °C (98.3 °F)  Heart Rate:  [41-79] 56  Resp:  [18-20] 20  BP: ()/(54-74) 95/56  FiO2 (%) (Set):  [65 %-70 %] 65 %    Intake/Output Summary (Last 24 hours) at 12/29/2020 1319  Last data filed at 12/28/2020 1400  Gross per 24 hour   Intake --   Output 600 ml   Net -600 ml       PHYSICAL EXAMINATION      Constitutional : appears in mild to moderate acute distress  Eyes : Extraocular movements are intact, pupils are equal in size and reactive to light bilaterally.    ENMT: JVD is not enlarged.  No lesions on oral mucosa.  Head is atraumatic  Respiratory : Few bilateral expiratory wheezes and distant breath sounds to auscultation bilaterally  Cardiovascular : Normal first and second heart sounds.  No heart murmurs  GI : Soft, nontender.  There is no organomegaly.  Bowel sounds are normal  Musculoskeletal : Normal range of motion  Extremity: There is no cyanosis, clubbing or edema.  Distal pulses are 2+ bilateral dorsalis pedis  Skin: Warm to touch.  No rashes, no ulcers  Psychiatry: Patient is cooperative, appropriate.  No delusions or hallucinations  Neurological: Awake alert and oriented x 3.  Motor strength is equal bilateral upper and lower extremities.  Sensations are intact bilaterally. CN 2-12 grossly intact     LABS / IMAGING / TELE      Labs  Lab Results   Component Value Date    WBC 5.00 12/29/2020    HGB 11.3 (L) 12/29/2020    HCT 33.1 (L) 12/29/2020    MCV 88.3 12/29/2020     12/29/2020     Lab Results   Component Value Date    GLUCOSE 100 (H)  12/29/2020    CALCIUM 8.1 (L) 12/29/2020     (L) 12/29/2020    K 4.4 12/29/2020    CO2 23 12/29/2020     12/29/2020    BUN 21 (H) 12/29/2020    CREATININE 0.7 (L) 12/29/2020         Results from last 7 days   Lab Units 12/25/20  0001   MAGNESIUM mg/dL 2.0         Results from last 7 days   Lab Units 12/29/20  0534 12/28/20  0456 12/27/20  0432   ALBUMIN g/dL 2.7* 2.8* 2.8*                 Results from last 7 days   Lab Units 12/29/20  0534 12/28/20  0456 12/27/20  0432   SODIUM mEQ/L 135* 134* 132*   POTASSIUM mEQ/L 4.4 4.2 4.0   CHLORIDE mEQ/L 103 100 99   CO2 mEQ/L 23 25 25   BUN mg/dL 21* 21* 23*   CREATININE mg/dL 0.7* 0.7* 0.7*   CALCIUM mg/dL 8.1* 8.1* 8.1*   ALBUMIN g/dL 2.7* 2.8* 2.8*   BILIRUBIN TOTAL mg/dL 1.3* 0.9 1.0   ALK PHOS IU/L 39 42 37   ALT IU/L 108* 120* 132*   AST IU/L 61* 87* 141*   GLUCOSE mg/dL 100* 128* 136*             Results from last 7 days   Lab Units 12/25/20  0001   INR INR 1.1   PTT sec 37*     Results from last 7 days   Lab Units 12/29/20  0534 12/28/20  0456 12/27/20  0432   ALK PHOS IU/L 39 42 37   BILIRUBIN TOTAL mg/dL 1.3* 0.9 1.0   ALBUMIN g/dL 2.7* 2.8* 2.8*   ALT IU/L 108* 120* 132*   AST IU/L 61* 87* 141*     No results found for: TROPONINT          Results from last 7 days   Lab Units 12/28/20  0456 12/26/20  0411 12/25/20  0001   FERRITIN ng/mL 2,127* >1,500* 2,323*            ECG/Telemetry  I have independently reviewed the telemetry. Significant findings include sinus haydee 58.  LINES, CATHETERS, DRAINS, AIRWAYS, AND WOUNDS   Lines, Drains, Airways, Wounds:  Peripheral IV 12/24/20 Left Antecubital (Active)   Number of days: 5       Comments:          ASSESSMENT AND PLAN      * Pneumonia due to COVID-19 virus  Assessment & Plan  Covid pneumonia with acute hypoxic respiratory failure  Chest x-ray with progressive patchy airspace opacities noted bilaterally  Continue Decadron  Not a candidate for remdesivir as symptoms started 13 days prior to  admission  Infectious disease following  Monitor oxygen requirements       Acute respiratory failure with hypoxia (CMS/HCC)  Assessment & Plan  Due to COVID-19  Prone protocol  Chest x-ray with progressive patchy airspace opacities  Pulmonary, infectious disease following    Pancytopenia (CMS/HCC)  Assessment & Plan  Likely related to acute infection  Monitor CBC w/daily labs  Consult heme/onc, signed off now that numbers are improving  Improving daily    Acute hyponatremia  Assessment & Plan  Hypovolemic hyponatremia resolved  Monitor sodium levels  Encourage p.o. intake    Coronary artery disease involving native coronary artery of native heart without angina pectoris  Assessment & Plan  Chronic, asymptomatic  Continue Lipitor, ASA       VTE Assessment: Padua VTE Score: 1  VTE Prophylaxis Plan: Lovenox  Code Status: Full Code  Estimated Discharge Date: 12/29/2020  Disposition Planning: Monitor oxygen requirements.  Continue steroids, dosing per pulmonary.  Currently on heated high flow 70% FiO2     Reagan Haque MD  12/29/2020

## 2020-12-29 NOTE — PATIENT CARE CONFERENCE
Care Progression Rounds Note  Date: 12/29/2020  Time: 10:15 AM     Patient Name: Nadeem Morales     Medical Record Number: [id number]   YOB: 1955  Sex: Male      Room/Bed: 4507W    Admitting Diagnosis: Shortness of breath [R06.02]  Hypoxia [R09.02]  COVID-19 virus infection [U07.1]  Pneumonia due to COVID-19 virus [U07.1, J12.89]   Admit Date/Time: 12/24/2020  8:28 PM    Primary Diagnosis: Pneumonia due to COVID-19 virus  Principal Problem: Pneumonia due to COVID-19 virus    GMLOS: 5.4  Anticipated Discharge Date: 12/29/2020    AM-PAC  Mobility Score:      Discharge Planning:  Living Arrangements: house  Anticipated Discharge Disposition: home without services    Barriers to Discharge:  Barriers to Discharge: Medical issues not resolved  Comment: [id number]@70%; IV decadron; no remedesivir;    Participants:  nursing, , dietitian/nutrition services, social work/services

## 2020-12-29 NOTE — PROGRESS NOTES
Infectious Disease Progress Note    Patient Name: Nadeem Morales  MR#: [id number]  : 1955  Admission Date: 2020  Date: 20   Time: 2:54 PM   Author: QI Srinivasan    Major Events:     Antibiotics:        Subjective   Patient states he is not having any pain, states he feels good at rest but realizes he has some shortness of breath when talking a lot or when moving around, tolerating diet, d/w nursing     Review of Systems    Pertinent items are noted in HPI.    Objective     Vital Signs:    Patient Vitals for the past 72 hrs:   BP Temp Temp src Pulse Resp SpO2   20 1100 -- 36.8 °C (98.3 °F) Oral -- -- --   20 0900 -- -- -- -- 20 (!) 90 %   20 0800 (!) 95/56 36.6 °C (97.8 °F) Oral (!) 56 18 --   20 0428 98/74 36.8 °C (98.2 °F) Oral 79 18 --   20 0400 -- -- -- (!) 41 -- --   20 0358 -- -- -- -- -- 92 %   20 0000 -- -- -- (!) 46 -- --   20 (!) 97/54 37.1 °C (98.8 °F) Oral 60 18 93 %   20 -- -- -- -- -- 93 %   20 (!) 119/56 36.7 °C (98.1 °F) Oral (!) 58 18 92 %   20 -- -- -- -- -- 92 %   20 -- -- -- (!) 52 -- --   20 -- -- -- -- 18 (!) 91 %   20 154 (!) 148/58 36.5 °C (97.7 °F) -- -- 18 97 %   20 1140 -- 36.4 °C (97.6 °F) -- -- 20 (!) 90 %   20 1136 (!) 114/59 36.6 °C (97.8 °F) -- 66 18 (!) 91 %   20 0900 -- -- -- -- -- 92 %   20 0824 112/60 36.6 °C (97.8 °F) -- 73 18 (!) 91 %   20 0800 -- -- -- -- 20 (!) 91 %   20 0400 105/61 36.8 °C (98.2 °F) Oral (!) 49 (!) 22 94 %   20 0341 -- -- -- -- (!) 22 94 %   20 0030 -- -- -- -- 20 97 %   20 0000 (!) 105/59 36.8 °C (98.3 °F) Oral (!) 44 20 96 %   20 (!) 113/58 36.8 °C (98.2 °F) Oral (!) 55 20 95 %   20 -- -- -- -- 20 95 %   20 -- -- -- (!) 52 -- --   20 1610 -- -- -- -- (!) 22 93 %   20 1600 114/62 36.9 °C (98.4 °F) Oral (!) 52 18  "(!) 91 %   20 1200 118/67 36.6 °C (97.9 °F) Oral (!) 56 18 97 %   20 1125 -- -- -- -- 20 99 %   20 0834 -- -- -- -- -- 95 %   20 0800 (!) 109/59 -- -- 61 19 (!) 89 %   20 0427 102/63 36.8 °C (98.2 °F) Oral 61 18 95 %   20 0401 -- -- -- -- -- 95 %   20 0356 -- -- -- (!) 55 -- --   20 0352 -- -- -- -- -- 93 %   20 0300 -- -- -- -- -- 92 %   20 0255 -- -- -- -- -- (!) 91 %   20 0249 -- -- -- -- -- (!) 90 %   20 0247 -- -- -- -- -- (!) 88 %   20 0245 -- -- -- -- -- (!) 88 %   20 0057 -- -- -- -- -- 94 %   20 0000 -- -- -- (!) 58 -- --   20 2313 (!) 99/50 36.8 °C (98.2 °F) Oral (!) 57 16 93 %   20 -- -- -- -- -- 93 %   20 -- -- -- -- -- (!) 90 %   20 -- -- -- -- -- 93 %   20 113/67 37.1 °C (98.7 °F) Oral 65 18 (!) 91 %   20 1938 -- -- -- 63 -- --   20 1630 -- -- -- -- -- 92 %   20 1600 106/67 37.4 °C (99.3 °F) Oral 67 19 (!) 89 %       Temp (72hrs), Av.8 °C (98.2 °F), Min:36.4 °C (97.6 °F), Max:37.4 °C (99.3 °F)      Physical Exam:    Visit Vitals  BP (!) 95/56 (BP Location: Left upper arm, Patient Position: Lying)   Pulse (!) 56   Temp 36.8 °C (98.3 °F) (Oral)   Resp 20   Ht 1.753 m (5' 9.02\")   Wt 76.7 kg (169 lb 1.5 oz)   SpO2 (!) 90%   BMI 24.96 kg/m²     General appearance: alert, appears stated age, cooperative and no distress  Eyes: anicteric  Lungs: diminished with crackles bilaterally, nonlabored, no cough observed  Abdomen: soft, ND, NT  Neurologic: Mental status: Alert, oriented, thought content appropriate    Lines, Drains, Airways, Wounds:  Peripheral IV 20 Left Antecubital (Active)   Number of days: 5       Labs:    CBC Results       20                    0534 0456 0432         WBC 5.00 3.85 3.88         RBC 3.75 3.76 3.69         HGB 11.3 11.1 11.3         HCT 33.1 33.3 31.9         MCV 88.3 88.6 86.4         MCH 30.1 " 29.5 30.6         MCHC 34.1 33.3 35.4          129          Comment for PLT at 0432 on 12/27/20: Platelet clumps still present after vortexing to eliminate platelet clumps. Unable to accurately estimate. Order PLATELET(CITRATE TUBE) and redraw if needed.      BMP Results       12/29/20 12/28/20 12/27/20                    0534 0456 0432          134 132         K 4.4 4.2 4.0         Cl 103 100 99         CO2 23 25 25         Glucose 100 128 136         BUN 21 21 23         Creatinine 0.7 0.7 0.7         Calcium 8.1 8.1 8.1         Anion Gap 9 9 8         EGFR >60.0 >60.0 >60.0                   PT/PTT Results       12/25/20 12/22/20                       0001 1124          PT 14.0 14.3          INR 1.1 1.1          PTT 37 32          Comment for INR at 0001 on 12/25/20: INR has no defined significance when PT is within Reference Range.    Comment for INR at 1124 on 12/22/20: INR has no defined significance when PT is within Reference Range.    Comment for PTT at 0001 on 12/25/20: The Standard Therapeutic Range for Heparin is 68 to 101 seconds.      UA Results       12/24/20                          2300           Color Yellow           Clarity Clear           Glucose Negative           Bilirubin Negative           Ketones +1           Sp Grav 1.023           Blood Negative           Ph 6.5           Protein +2           Urobilinogen 1.0           Nitrite Negative           Leuk Est Negative           WBC 0 TO 3           RBC 0 TO 4           Bacteria None Seen           Comment for Ketones at 2300 on 12/24/20: Free sulfhydryl drugs such as Mesna, Capoten, and Acetylcysteine (Mucomyst) may cause false positive ketonuria.    Comment for Blood at 2300 on 12/24/20: The sensitivity of the occult blood test is equivalent to approximately 4 intact RBC/HPF.    Comment for Leuk Est at 2300 on 12/24/20: Results can be falsely negative due to high specific gravity, some antibiotics, glucose >3 g/dl, or WBC other  than neutrophils.      Lactate Results       12/24/20 2042           Lactate 1.2                           Microbiology Results     Procedure Component Value Units Date/Time    Blood Culture Blood, Venous [[id number]] Collected: 12/24/20 2042    Specimen: Blood, Venous Updated: 12/28/20 2301     Culture No growth at 96 hours    Blood Culture Blood, Venous [[id number]] Collected: 12/24/20 2042    Specimen: Blood, Venous Updated: 12/28/20 2301     Culture No growth at 96 hours          Pathology Results     ** No results found for the last 720 hours. **          Echo:          Imaging:    Radiology Imaging   XR CHEST 1 VW    Narrative CLINICAL HISTORY:  COVID-19.    COMMENT:    Views: Single frontal portable..    Comparison date: 12/24/2020.    The heart and mediastinal contours are unchanged.  The trachea is midline.  Progressive patchy airspace opacities are noted bilaterally.  There  are no  pleural effusions.  The osseous structures are stable.          Impression IMPRESSION:  Progressive patchy airspace opacities are noted bilaterally.           Assessment     COVID 19 PNA with acute hypoxic respiratory failure  - on HHF NC  - afebrile  - no leukocytosis, leukopenia resolved  - on steroids  - symptoms began > 14 days ago so no perceived benefit from remdesivir at this time  - blood sterile              Plan     Continue supportive care and steroid mgmt per primary team

## 2020-12-29 NOTE — PLAN OF CARE
Plan of Care Review  Plan of Care Reviewed With: patient  Progress: improving  Outcome Summary: pt awake and alert. No c/o pain. Tolerating cardiac diet. 40L heated high flow. RRT following. Urinal at bedsid. States needs met. Call light within reach and will monitor.

## 2020-12-30 LAB
ALBUMIN SERPL-MCNC: 2.5 G/DL (ref 3.4–5)
ALP SERPL-CCNC: 40 IU/L (ref 35–126)
ALT SERPL-CCNC: 92 IU/L (ref 16–63)
ANION GAP SERPL CALC-SCNC: 8 MEQ/L (ref 3–15)
AST SERPL-CCNC: 50 IU/L (ref 15–41)
BASOPHILS # BLD: 0 K/UL (ref 0.01–0.1)
BASOPHILS NFR BLD: 0 %
BILIRUB SERPL-MCNC: 1.2 MG/DL (ref 0.3–1.2)
BUN SERPL-MCNC: 20 MG/DL (ref 8–20)
CALCIUM SERPL-MCNC: 8.1 MG/DL (ref 8.9–10.3)
CHLORIDE SERPL-SCNC: 104 MEQ/L (ref 98–109)
CK SERPL-CCNC: 28 U/L (ref 16–300)
CK SERPL-CCNC: 37 U/L (ref 16–300)
CO2 SERPL-SCNC: 22 MEQ/L (ref 22–32)
CREAT SERPL-MCNC: 0.6 MG/DL (ref 0.8–1.3)
CRP SERPL-MCNC: 23.1 MG/L
CRP SERPL-MCNC: 46.8 MG/L
DIFFERENTIAL METHOD BLD: ABNORMAL
EOSINOPHIL # BLD: 0 K/UL (ref 0.04–0.54)
EOSINOPHIL NFR BLD: 0 %
ERYTHROCYTE [DISTWIDTH] IN BLOOD BY AUTOMATED COUNT: 12.9 % (ref 11.6–14.4)
FERRITIN SERPL-MCNC: 1759 NG/ML (ref 24–250)
GFR SERPL CREATININE-BSD FRML MDRD: >60 ML/MIN/1.73M*2
GLUCOSE SERPL-MCNC: 98 MG/DL (ref 70–99)
HCT VFR BLDCO AUTO: 31.9 % (ref 40.1–51)
HGB BLD-MCNC: 10.9 G/DL (ref 13.7–17.5)
HYPOCHROMIA BLD QL SMEAR: ABNORMAL
LDH SERPL L TO P-CCNC: 538 IU/L (ref 98–192)
LDH SERPL L TO P-CCNC: 570 IU/L (ref 98–192)
LYMPHOCYTES # BLD: 0.88 K/UL (ref 1.2–3.5)
LYMPHOCYTES NFR BLD: 14 %
MCH RBC QN AUTO: 30.1 PG (ref 28–33.2)
MCHC RBC AUTO-ENTMCNC: 34.2 G/DL (ref 32.2–36.5)
MCV RBC AUTO: 88.1 FL (ref 83–98)
MONOCYTES # BLD: 0.13 K/UL (ref 0.3–1)
MONOCYTES NFR BLD: 2 %
NEUTS BAND # BLD: 0.06 K/UL (ref 0–0.53)
NEUTS BAND # BLD: 5.2 K/UL (ref 1.7–7)
NEUTS BAND NFR BLD: 1 %
NEUTS SEG NFR BLD: 83 %
OVALOCYTES BLD QL SMEAR: ABNORMAL
PDW BLD AUTO: 11.6 FL (ref 9.4–12.4)
PLAT MORPH BLD: NORMAL
PLATELET # BLD AUTO: 291 K/UL (ref 150–350)
PLATELET # BLD EST: ABNORMAL 10*3/UL
POLYCHROMASIA BLD QL SMEAR: ABNORMAL
POTASSIUM SERPL-SCNC: 4.5 MEQ/L (ref 3.6–5.1)
PROT SERPL-MCNC: 5.9 G/DL (ref 6–8.2)
RBC # BLD AUTO: 3.62 M/UL (ref 4.5–5.8)
SODIUM SERPL-SCNC: 134 MEQ/L (ref 136–144)
WBC # BLD AUTO: 6.26 K/UL (ref 3.8–10.5)

## 2020-12-30 PROCEDURE — 63600000 HC DRUGS/DETAIL CODE: Mod: JW | Performed by: HOSPITALIST

## 2020-12-30 PROCEDURE — 36415 COLL VENOUS BLD VENIPUNCTURE: CPT | Performed by: HOSPITALIST

## 2020-12-30 PROCEDURE — 82728 ASSAY OF FERRITIN: CPT | Performed by: HOSPITALIST

## 2020-12-30 PROCEDURE — 86140 C-REACTIVE PROTEIN: CPT | Performed by: HOSPITALIST

## 2020-12-30 PROCEDURE — 85025 COMPLETE CBC W/AUTO DIFF WBC: CPT | Performed by: HOSPITALIST

## 2020-12-30 PROCEDURE — 83615 LACTATE (LD) (LDH) ENZYME: CPT | Performed by: HOSPITALIST

## 2020-12-30 PROCEDURE — 82550 ASSAY OF CK (CPK): CPT | Performed by: HOSPITALIST

## 2020-12-30 PROCEDURE — 27900059 OXYGEN DAILY

## 2020-12-30 PROCEDURE — 63600000 HC DRUGS/DETAIL CODE: Performed by: INTERNAL MEDICINE

## 2020-12-30 PROCEDURE — 63700000 HC SELF-ADMINISTRABLE DRUG: Performed by: HOSPITALIST

## 2020-12-30 PROCEDURE — 25800000 HC PHARMACY IV SOLUTIONS: Performed by: INTERNAL MEDICINE

## 2020-12-30 PROCEDURE — 87040 BLOOD CULTURE FOR BACTERIA: CPT | Performed by: INTERNAL MEDICINE

## 2020-12-30 PROCEDURE — 20600000 HC ROOM AND CARE INTERMEDIATE/TELEMETRY

## 2020-12-30 PROCEDURE — 99233 SBSQ HOSP IP/OBS HIGH 50: CPT | Mod: CR | Performed by: INTERNAL MEDICINE

## 2020-12-30 PROCEDURE — 80053 COMPREHEN METABOLIC PANEL: CPT | Performed by: HOSPITALIST

## 2020-12-30 RX ADMIN — THERA TABS 1 TABLET: TAB at 08:09

## 2020-12-30 RX ADMIN — CEFTRIAXONE SODIUM 1 G: 1 INJECTION, POWDER, FOR SOLUTION INTRAMUSCULAR; INTRAVENOUS at 19:30

## 2020-12-30 RX ADMIN — ATORVASTATIN CALCIUM 10 MG: 10 TABLET, FILM COATED ORAL at 18:04

## 2020-12-30 RX ADMIN — DEXAMETHASONE SODIUM PHOSPHATE 6 MG: 4 INJECTION, SOLUTION INTRA-ARTICULAR; INTRALESIONAL; INTRAMUSCULAR; INTRAVENOUS; SOFT TISSUE at 08:09

## 2020-12-30 RX ADMIN — ASPIRIN 81 MG: 81 TABLET, COATED ORAL at 08:09

## 2020-12-30 RX ADMIN — ENOXAPARIN SODIUM 40 MG: 40 INJECTION SUBCUTANEOUS at 05:04

## 2020-12-30 RX ADMIN — DEXAMETHASONE SODIUM PHOSPHATE 14 MG: 4 INJECTION, SOLUTION INTRA-ARTICULAR; INTRALESIONAL; INTRAMUSCULAR; INTRAVENOUS; SOFT TISSUE at 19:10

## 2020-12-30 RX ADMIN — ENOXAPARIN SODIUM 40 MG: 40 INJECTION SUBCUTANEOUS at 18:04

## 2020-12-30 ASSESSMENT — ENCOUNTER SYMPTOMS
SHORTNESS OF BREATH: 1
FATIGUE: 1
COUGH: 0
WEAKNESS: 1

## 2020-12-30 NOTE — PLAN OF CARE
Problem: Adult Inpatient Plan of Care  Goal: Plan of Care Review  Flowsheets (Taken 12/30/2020 1207)  Progress: no change  Outcome Summary: Pt discussed in Care Progression Rounds. Pt continues on HHF O2 at 50 liters. Continues on IV Decadron. Pt proning.CC will continue to follow.

## 2020-12-30 NOTE — PROGRESS NOTES
" Pulmonary Daily Progress Note    SUBJECTIVE: Patient seen and examined at the bedside.  Patient's oxygen requirements have increased and is currently on 50 L/min and FiO2 80% saturating 92%.  Patient reports exhaustion while just turning his position from left lateral to right lateral position.      Allergies:   Patient has no known allergies.    Review of Systems:  Review of Systems   Constitutional: Positive for fatigue.   Respiratory: Positive for shortness of breath. Negative for cough.    Neurological: Positive for weakness.   All other systems reviewed and are negative.      Input/Ouput in Last 24 hours:  No intake or output data in the 24 hours ending 12/30/20 1475    Objective     Vital Signs for the last 24 hours:  Visit Vitals  /60 (BP Location: Left upper arm, Patient Position: Lying)   Pulse 77   Temp 36.9 °C (98.4 °F) (Oral)   Resp 19   Ht 1.753 m (5' 9.02\")   Wt 76.7 kg (169 lb 1.5 oz)   SpO2 92%   BMI 24.96 kg/m²     Oxygen Therapy: Supplemental oxygen    Physical Exam:  Physical Exam  Vitals signs reviewed.   Constitutional:       General: He is not in acute distress.     Appearance: Normal appearance. He is not toxic-appearing.      Comments: Looks exhaused, on high flow nasal canula   HENT:      Head: Normocephalic and atraumatic.      Nose: No rhinorrhea.      Mouth/Throat:      Mouth: Mucous membranes are moist.   Eyes:      Pupils: Pupils are equal, round, and reactive to light.   Neck:      Musculoskeletal: Normal range of motion and neck supple. No neck rigidity.   Cardiovascular:      Rate and Rhythm: Normal rate and regular rhythm.      Pulses: Normal pulses.      Heart sounds: No murmur. No gallop.    Pulmonary:      Effort: Pulmonary effort is normal.      Breath sounds: Normal breath sounds.   Abdominal:      General: Abdomen is flat. Bowel sounds are normal.      Palpations: Abdomen is soft.   Musculoskeletal:      Right lower leg: No edema.      Left lower leg: No edema. "   Lymphadenopathy:      Cervical: No cervical adenopathy.   Skin:     General: Skin is warm.      Capillary Refill: Capillary refill takes less than 2 seconds.   Neurological:      General: No focal deficit present.      Mental Status: He is alert and oriented to person, place, and time. Mental status is at baseline.          LABS:  Results from last 7 days   Lab Units 12/30/20  0512 12/29/20  0534 12/28/20  0456  12/25/20  0001   SODIUM mEQ/L 134* 135* 134*   < >  --    CO2 mEQ/L 22 23 25   < >  --    BUN mg/dL 20 21* 21*   < >  --    CALCIUM mg/dL 8.1* 8.1* 8.1*   < >  --    ALBUMIN g/dL 2.5* 2.7* 2.8*   < >  --    PHOSPHORUS mg/dL  --   --   --   --  2.6    < > = values in this interval not displayed.     Results from last 7 days   Lab Units 12/30/20  0512 12/29/20  0534 12/28/20  0456   WBC K/uL 6.26 5.00 3.85   PLATELETS K/uL 291 150 129*     Lab Results   Component Value Date    WBC 6.26 12/30/2020    HGB 10.9 (L) 12/30/2020    HCT 31.9 (L) 12/30/2020     12/30/2020    ALT 92 (H) 12/30/2020    AST 50 (H) 12/30/2020     (L) 12/30/2020    K 4.5 12/30/2020     12/30/2020    CREATININE 0.6 (L) 12/30/2020    BUN 20 12/30/2020    CO2 22 12/30/2020    INR 1.1 12/25/2020         Current Facility-Administered Medications:   •  acetaminophen (TYLENOL) tablet 650 mg, 650 mg, oral, q4h PRN, 650 mg at 12/25/20 2130 **OR** acetaminophen (TYLENOL) suppository 650 mg, 650 mg, rectal, q4h PRN **OR** acetaminophen (TYLENOL) 650 mg/20.3 mL solution 650 mg, 650 mg, oral, q4h PRN, Debbie Whitfield MD  •  albuterol HFA (VENTOLIN HFA) 90 mcg/actuation inhaler 2 puff, 2 puff, inhalation, q6h PRN, Debbie Whitfield MD  •  aspirin enteric coated tablet 81 mg, 81 mg, oral, Daily, Debbie Whitfield MD, 81 mg at 12/30/20 0809  •  atorvastatin (LIPITOR) tablet 10 mg, 10 mg, oral, Daily (6p), Debbie Whitfield MD, 10 mg at 12/29/20 1727  •  dexamethasone (DECADRON) 14 mg in sodium chloride 0.9 % 50 mL IVPB, 14 mg, intravenous,  Once, Ron Anthony MD  •  [START ON 12/31/2020] dexamethasone (DECADRON) 20 mg in sodium chloride 0.9 % 50 mL IVPB, 20 mg, intravenous, Daily, Ron Anthony MD  •  glucose chewable tablet 16-32 g of dextrose, 16-32 g of dextrose, oral, PRN **OR** dextrose 40 % oral gel 15-30 g of dextrose, 15-30 g of dextrose, oral, PRN **OR** glucagon (GLUCAGEN) injection 1 mg, 1 mg, intramuscular, PRN **OR** dextrose in water injection 12.5 g, 25 mL, intravenous, PRN, Debbie Whitfield MD  •  glucose chewable tablet 16-32 g of dextrose, 16-32 g of dextrose, oral, PRN **OR** dextrose 40 % oral gel 15-30 g of dextrose, 15-30 g of dextrose, oral, PRN **OR** glucagon (GLUCAGEN) injection 1 mg, 1 mg, intramuscular, PRN **OR** dextrose in water injection 12.5 g, 25 mL, intravenous, PRN, Debbie Whitfield MD  •  enoxaparin (LOVENOX) syringe 40 mg, 0.5 mg/kg, subcutaneous, q12h (6a, 6p), Salinas Villalta DO, 40 mg at 12/30/20 0504  •  melatonin capsule 5 mg, 5 mg, oral, Nightly PRN, Debbie Whitfield MD, 5 mg at 12/25/20 2319  •  multivitamin tablet 1 tablet, 1 tablet, oral, Daily, Debbie Whitfield MD, 1 tablet at 12/30/20 0809  •  ondansetron (ZOFRAN) injection 4 mg, 4 mg, intravenous, q8h PRN, Debbie Whitfield MD      Imaging/Radiology:  No radiological imaging available today    IMPRESSION AND PLAN :  65-year-old male admitted with hypoxic respiratory failure due to COVID-19 pneumonia currently being treated with 6mg iv dexamethasone and high flow nasal cannula with increasing oxygen requirements currently on 55 L/min and FiO2 80% saturating 92%, not a candidate for remdesivir based on symptoms more than 14 days.    Problem list  COVID-19 pneumonia  Acute hypoxic respiratory failure  ARDS  Former smoker    Recommendations:  - We will increase his Decadron to 20 mg for at least 5 days.  Given 6 mg IV today, will provide additional 14 mg for today   - Continue high flow nasal cannula to keep saturation between 92 to 94%. Use of  noninvasive ventilation CPAP/BiPAP in case of worsening breathing.  - Intermediate level of DVT prophylaxis  - Continue modified pronning.  - Chest x-ray ordered for tomorrow morning, f/u results.  - obtain sputum for gram stain and cultures. Low threshold for starting on broad-spectrum antibiotics.    Involvement of palliative care to discuss goals of care.    Ron Anthony MD  Pulmonary and Critical Care

## 2020-12-30 NOTE — PATIENT CARE CONFERENCE
Care Progression Rounds Note  Date: 12/30/2020  Time: 10:01 AM     Patient Name: Nadeem Morales     Medical Record Number: [id number]   YOB: 1955  Sex: Male      Room/Bed: 4507W    Admitting Diagnosis: Shortness of breath [R06.02]  Hypoxia [R09.02]  COVID-19 virus infection [U07.1]  Pneumonia due to COVID-19 virus [U07.1, J12.89]   Admit Date/Time: 12/24/2020  8:28 PM    Primary Diagnosis: Pneumonia due to COVID-19 virus  Principal Problem: Pneumonia due to COVID-19 virus    GMLOS: 5.4  Anticipated Discharge Date: 1/1/2021    AM-PAC  Mobility Score:      Discharge Planning:  Living Arrangements: house  Anticipated Discharge Disposition: home without services    Barriers to Discharge:  Barriers to Discharge: Medical issues not resolved  Comment: [facility] 50L @75%; proning; SB; iv decadron;     Participants:  nursing, dietitian/nutrition services, , social work/services

## 2020-12-30 NOTE — PROGRESS NOTES
Hospital Medicine Service -  Daily Progress Note       SUBJECTIVE   Interval History: Patient states that he became more short of breath when he tried to do modified proning.  He states that every time he moves he feels decompensated     OBJECTIVE      Vital signs in last 24 hours:  Temp:  [36.6 °C (97.9 °F)-37 °C (98.6 °F)] 36.9 °C (98.4 °F)  Heart Rate:  [44-77] 77  Resp:  [18-22] 19  BP: ()/(58-64) 104/60  FiO2 (%) (Set):  [60 %-80 %] 80 %  No intake or output data in the 24 hours ending 12/30/20 1550    PHYSICAL EXAMINATION      Constitutional : appears in mild acute distress  Eyes : Extraocular movements are intact, pupils are equal in size and reactive to light bilaterally.    ENMT: JVD is not enlarged.  No lesions on oral mucosa.  Head is atraumatic  Respiratory : Bilateral rhonchi to auscultation bilaterally  Cardiovascular : Normal first and second heart sounds.  No heart murmurs  GI : Soft, nontender.  There is no organomegaly.  Bowel sounds are normal  Musculoskeletal : Normal range of motion  Extremity: There is no cyanosis, clubbing or edema.  Distal pulses are 2+ bilateral dorsalis pedis  Skin: Warm to touch.  No rashes, no ulcers  Psychiatry: Patient is cooperative, anxious  Neurological: Awake alert and oriented x 3.  Motor strength is equal bilateral upper and lower extremities.  Sensations are intact bilaterally. CN 2-12 grossly intact     LABS / IMAGING / TELE      Labs  Lab Results   Component Value Date    WBC 6.26 12/30/2020    HGB 10.9 (L) 12/30/2020    HCT 31.9 (L) 12/30/2020    MCV 88.1 12/30/2020     12/30/2020     Lab Results   Component Value Date    GLUCOSE 98 12/30/2020    CALCIUM 8.1 (L) 12/30/2020     (L) 12/30/2020    K 4.5 12/30/2020    CO2 22 12/30/2020     12/30/2020    BUN 20 12/30/2020    CREATININE 0.6 (L) 12/30/2020         Results from last 7 days   Lab Units 12/25/20  0001   MAGNESIUM mg/dL 2.0         Results from last 7 days   Lab Units  12/30/20  0512 12/29/20  0534 12/28/20  0456   ALBUMIN g/dL 2.5* 2.7* 2.8*                 Results from last 7 days   Lab Units 12/30/20  0512 12/29/20  0534 12/28/20  0456   SODIUM mEQ/L 134* 135* 134*   POTASSIUM mEQ/L 4.5 4.4 4.2   CHLORIDE mEQ/L 104 103 100   CO2 mEQ/L 22 23 25   BUN mg/dL 20 21* 21*   CREATININE mg/dL 0.6* 0.7* 0.7*   CALCIUM mg/dL 8.1* 8.1* 8.1*   ALBUMIN g/dL 2.5* 2.7* 2.8*   BILIRUBIN TOTAL mg/dL 1.2 1.3* 0.9   ALK PHOS IU/L 40 39 42   ALT IU/L 92* 108* 120*   AST IU/L 50* 61* 87*   GLUCOSE mg/dL 98 100* 128*             Results from last 7 days   Lab Units 12/25/20  0001   INR INR 1.1   PTT sec 37*     Results from last 7 days   Lab Units 12/30/20  0512 12/29/20  0534 12/28/20  0456   ALK PHOS IU/L 40 39 42   BILIRUBIN TOTAL mg/dL 1.2 1.3* 0.9   ALBUMIN g/dL 2.5* 2.7* 2.8*   ALT IU/L 92* 108* 120*   AST IU/L 50* 61* 87*     No results found for: TROPONINT          Results from last 7 days   Lab Units 12/30/20  0512 12/28/20  0456 12/26/20  0411   FERRITIN ng/mL 1,759* 2,127* >1,500*           ECG/Telemetry  I have independently reviewed the telemetry. Significant findings include Normal sinus rhythm rate 77/min.  LINES, CATHETERS, DRAINS, AIRWAYS, AND WOUNDS   Lines, Drains, Airways, Wounds:  Peripheral IV 12/24/20 Left Antecubital (Active)   Number of days: 6       Comments:          ASSESSMENT AND PLAN      * Pneumonia due to COVID-19 virus  Assessment & Plan  Covid pneumonia with acute hypoxic respiratory failure  Chest x-ray with progressive patchy airspace opacities noted bilaterally  Continue Decadron  Not a candidate for remdesivir as symptoms started 13 days prior to admission  Discussed with infectious disease.  Continue current dose of Decadron  Currently on heated high flow 80% FiO2         Acute respiratory failure with hypoxia (CMS/HCC)  Assessment & Plan  Due to COVID-19  Prone protocol  Chest x-ray with progressive patchy airspace opacities  Pulmonary, infectious disease  following    Pancytopenia (CMS/HCC)  Assessment & Plan  Likely related to acute infection  Monitor CBC w/daily labs  Consult heme/onc, signed off now that numbers are improving  Improving daily    Acute hyponatremia  Assessment & Plan  Hypovolemic hyponatremia resolved  Monitor sodium levels  Encourage p.o. intake    Coronary artery disease involving native coronary artery of native heart without angina pectoris  Assessment & Plan  Chronic, asymptomatic  Continue Lipitor, ASA       VTE Assessment: [place] VTE Score: 1  VTE Prophylaxis Plan: Lovenox  Code Status: Full Code  Estimated Discharge Date: 1/1/2021  Disposition Planning: Due to monitor oxygen requirements     Reagan Haque MD  12/30/2020

## 2020-12-30 NOTE — PROGRESS NOTES
Infectious Disease Progress Note    Patient Name: Nadeem Morales  MR#: [id number]  : 1955  Admission Date: 2020  Date: 20   Time: 5:51 PM   Author: Jean Hammond MD    Major Events: Pt alert, oral intake stable, increased dyspnea upon exertion, increased oxygen requirements, no fever, no chest pain or increased stool formation    Antibiotics:        Subjective     Review of Systems    Pertinent items are noted in HPI.    Objective     Vital Signs:    Patient Vitals for the past 72 hrs:   BP Temp Temp src Pulse Resp SpO2   20 1700 -- -- -- -- 20 (!) 91 %   20 1200 104/60 36.9 °C (98.4 °F) Oral 77 19 92 %   20 1130 -- -- -- -- 20 (!) 91 %   20 0800 (!) 99/58 36.8 °C (98.3 °F) Oral (!) 58 (!) 22 (!) 90 %   20 0400 111/64 36.9 °C (98.4 °F) Oral (!) 44 18 92 %   20 0359 -- -- -- -- -- 93 %   20 0021 (!) 103/59 37 °C (98.6 °F) Oral 60 20 92 %   20 0000 -- -- -- (!) 47 -- --   20 2355 -- -- -- -- -- 92 %   20 -- -- -- -- -- (!) 91 %   20 -- -- -- (!) 52 -- --   20 111/63 36.6 °C (97.9 °F) Oral 61 20 (!) 90 %   20 1600 -- -- -- -- 20 96 %   20 1535 109/64 36.9 °C (98.5 °F) Oral (!) 58 20 --   20 1100 -- 36.8 °C (98.3 °F) Oral -- -- --   20 0900 -- -- -- -- 20 (!) 90 %   20 0800 (!) 95/56 36.6 °C (97.8 °F) Oral (!) 56 18 --   20 0428 98/74 36.8 °C (98.2 °F) Oral 79 18 --   20 0400 -- -- -- (!) 41 -- --   20 0358 -- -- -- -- -- 92 %   20 0000 -- -- -- (!) 46 -- --   20 (!) 97/54 37.1 °C (98.8 °F) Oral 60 18 93 %   20 -- -- -- -- -- 93 %   20 (!) 119/56 36.7 °C (98.1 °F) Oral (!) 58 18 92 %   20 -- -- -- -- -- 92 %   20 -- -- -- (!) 52 -- --   20 1600 -- -- -- -- 18 (!) 91 %   20 1541 (!) 148/58 36.5 °C (97.7 °F) -- -- 18 97 %   20 1140 -- 36.4 °C (97.6 °F) -- -- 20 (!) 90 %   20 1136  (!) 114/59 36.6 °C (97.8 °F) -- 66 18 (!) 91 %   20 0900 -- -- -- -- -- 92 %   20 0824 112/60 36.6 °C (97.8 °F) -- 73 18 (!) 91 %   20 0800 -- -- -- -- 20 (!) 91 %   20 0400 105/61 36.8 °C (98.2 °F) Oral (!) 49 (!) 22 94 %   20 0341 -- -- -- -- (!) 22 94 %   20 0030 -- -- -- -- 20 97 %   20 0000 (!) 105/59 36.8 °C (98.3 °F) Oral (!) 44 20 96 %   20 (!) 113/58 36.8 °C (98.2 °F) Oral (!) 55 20 95 %   20 -- -- -- -- 20 95 %   20 -- -- -- (!) 52 -- --       Temp (72hrs), Av.8 °C (98.2 °F), Min:36.4 °C (97.6 °F), Max:37.1 °C (98.8 °F)      Physical Exam:    Alert  No stridor or meningismus  Lung progressive respiratory distress  Iv access intact  abd no distension  Neuro cn intact ms 5/5 upper and lower    Lines, Drains, Airways, Wounds:  Peripheral IV 20 Left Antecubital (Active)   Number of days: 6       Labs:    CBC Results       20                    0512 0534 0456         WBC 6.26 5.00 3.85         RBC 3.62 3.75 3.76         HGB 10.9 11.3 11.1         HCT 31.9 33.1 33.3         MCV 88.1 88.3 88.6         MCH 30.1 30.1 29.5         MCHC 34.2 34.1 33.3          150 129         Comment for PLT at 0512 on 20: RESULTS CHECKED. CONFIRMED WITH SMEAR ESTIMATE      BMP Results       1220                    0512 0534 0456          135 134         K 4.5 4.4 4.2         Cl 104 103 100         CO2  25         Glucose 98 100 128         BUN          Creatinine 0.6 0.7 0.7         Calcium 8.1 8.1 8.1         Anion Gap 8 9 9         EGFR >60.0 >60.0 >60.0                   PT/PTT Results       20                       0001 1124          PT 14.0 14.3          INR 1.1 1.1          PTT 37 32          Comment for INR at 0001 on 20: INR has no defined significance when PT is within Reference Range.    Comment for INR at 1124 on 20: INR has no defined  significance when PT is within Reference Range.    Comment for PTT at 0001 on 12/25/20: The Standard Therapeutic Range for Heparin is 68 to 101 seconds.      UA Results       12/24/20 2300           Color Yellow           Clarity Clear           Glucose Negative           Bilirubin Negative           Ketones +1           Sp Grav 1.023           Blood Negative           Ph 6.5           Protein +2           Urobilinogen 1.0           Nitrite Negative           Leuk Est Negative           WBC 0 TO 3           RBC 0 TO 4           Bacteria None Seen           Comment for Ketones at 2300 on 12/24/20: Free sulfhydryl drugs such as Mesna, Capoten, and Acetylcysteine (Mucomyst) may cause false positive ketonuria.    Comment for Blood at 2300 on 12/24/20: The sensitivity of the occult blood test is equivalent to approximately 4 intact RBC/HPF.    Comment for Leuk Est at 2300 on 12/24/20: Results can be falsely negative due to high specific gravity, some antibiotics, glucose >3 g/dl, or WBC other than neutrophils.      Lactate Results       12/24/20 2042           Lactate 1.2                           Microbiology Results     Procedure Component Value Units Date/Time    Blood Culture Blood, Venous [[id number]] Collected: 12/24/20 2042    Specimen: Blood, Venous Updated: 12/29/20 2301     Culture No growth at 120 hours    Blood Culture Blood, Venous [[id number]] Collected: 12/24/20 2042    Specimen: Blood, Venous Updated: 12/29/20 2301     Culture No growth at 120 hours          Pathology Results     ** No results found for the last 720 hours. **          Echo:          Imaging:    Radiology Imaging   XR CHEST 1 VW    Narrative CLINICAL HISTORY:  COVID-19.    COMMENT:    Views: Single frontal portable..    Comparison date: 12/24/2020.    The heart and mediastinal contours are unchanged.  The trachea is midline.  Progressive patchy airspace opacities are noted bilaterally.  There   are no  pleural effusions.  The osseous structures are stable.          Impression IMPRESSION:  Progressive patchy airspace opacities are noted bilaterally.           Assessment     Covid-19  Pneumonia  Hypoxic respiratory distress    Pancytopenia   Resolved    Secondary bacterial pneumonia as reasonable consideration  C/s obtained           Plan     abx mgmt  Recommend ceftriaxone pending c/s analysis    Discussed clinical presentation with pt

## 2020-12-30 NOTE — PROGRESS NOTES
" Pulmonary Daily Progress Note    SUBJECTIVE: Patient seen and examined at the bedside.  Patient's oxygen requirements continued to be steady and is currently on 45 L/min and FiO2 60%.   Allergies:   Patient has no known allergies.    Review of Systems:  Review of Systems   Constitutional: Positive for fatigue.   Respiratory: Positive for shortness of breath. Negative for cough.    All other systems reviewed and are negative.      Input/Ouput in Last 24 hours:  No intake or output data in the 24 hours ending 12/29/20 2301    Objective     Vital Signs for the last 24 hours:  Visit Vitals  /63   Pulse 61   Temp 36.6 °C (97.9 °F) (Oral)   Resp 20   Ht 1.753 m (5' 9.02\")   Wt 76.7 kg (169 lb 1.5 oz)   SpO2 (!) 91%   BMI 24.96 kg/m²     Oxygen Therapy: Supplemental oxygen    Physical Exam:  Physical Exam  Vitals signs reviewed.   Constitutional:       Appearance: Normal appearance. He is normal weight.   HENT:      Head: Normocephalic and atraumatic.      Nose:      Comments: High flow nasal cannula noted     Mouth/Throat:      Mouth: Mucous membranes are moist.   Eyes:      Pupils: Pupils are equal, round, and reactive to light.   Neck:      Musculoskeletal: Neck supple. No neck rigidity.   Cardiovascular:      Rate and Rhythm: Normal rate.      Pulses: Normal pulses.      Heart sounds: No murmur. No gallop.    Pulmonary:      Effort: No respiratory distress.      Breath sounds: Normal breath sounds. No wheezing, rhonchi or rales.   Abdominal:      General: Abdomen is flat. Bowel sounds are normal.      Palpations: Abdomen is soft.   Musculoskeletal:      Right lower leg: No edema.      Left lower leg: No edema.   Lymphadenopathy:      Cervical: No cervical adenopathy.   Skin:     General: Skin is warm.      Capillary Refill: Capillary refill takes less than 2 seconds.   Neurological:      General: No focal deficit present.      Mental Status: He is alert and oriented to person, place, and time. Mental status is " at baseline.          LABS:  Results from last 7 days   Lab Units 12/29/20  0534 12/28/20  0456 12/27/20  0432  12/25/20  0001   SODIUM mEQ/L 135* 134* 132*   < >  --    CO2 mEQ/L 23 25 25   < >  --    BUN mg/dL 21* 21* 23*   < >  --    CALCIUM mg/dL 8.1* 8.1* 8.1*   < >  --    ALBUMIN g/dL 2.7* 2.8* 2.8*   < >  --    PHOSPHORUS mg/dL  --   --   --   --  2.6    < > = values in this interval not displayed.     Results from last 7 days   Lab Units 12/29/20  0534 12/28/20  0456 12/27/20  0432 12/26/20  0410   WBC K/uL 5.00 3.85 3.88 3.46*   PLATELETS K/uL 150 129*  --  212     Lab Results   Component Value Date    WBC 5.00 12/29/2020    HGB 11.3 (L) 12/29/2020    HCT 33.1 (L) 12/29/2020     12/29/2020     (H) 12/29/2020    AST 61 (H) 12/29/2020     (L) 12/29/2020    K 4.4 12/29/2020     12/29/2020    CREATININE 0.7 (L) 12/29/2020    BUN 21 (H) 12/29/2020    CO2 23 12/29/2020    INR 1.1 12/25/2020         Current Facility-Administered Medications:   •  acetaminophen (TYLENOL) tablet 650 mg, 650 mg, oral, q4h PRN, 650 mg at 12/25/20 2130 **OR** acetaminophen (TYLENOL) suppository 650 mg, 650 mg, rectal, q4h PRN **OR** acetaminophen (TYLENOL) 650 mg/20.3 mL solution 650 mg, 650 mg, oral, q4h PRN, Debbie Whitfield MD  •  albuterol HFA (VENTOLIN HFA) 90 mcg/actuation inhaler 2 puff, 2 puff, inhalation, q6h PRN, Debbie Whitfield MD  •  aspirin enteric coated tablet 81 mg, 81 mg, oral, Daily, Debbie Whitfield MD, 81 mg at 12/29/20 0836  •  atorvastatin (LIPITOR) tablet 10 mg, 10 mg, oral, Daily (6p), Debbie Whitfield MD, 10 mg at 12/29/20 1727  •  dexamethasone (DECADRON) injection 6 mg, 6 mg, intravenous, Daily, Debbie Whitfield MD, 6 mg at 12/29/20 0836  •  glucose chewable tablet 16-32 g of dextrose, 16-32 g of dextrose, oral, PRN **OR** dextrose 40 % oral gel 15-30 g of dextrose, 15-30 g of dextrose, oral, PRN **OR** glucagon (GLUCAGEN) injection 1 mg, 1 mg, intramuscular, PRN **OR** dextrose in water  injection 12.5 g, 25 mL, intravenous, PRN, Debbie Whitfield MD  •  glucose chewable tablet 16-32 g of dextrose, 16-32 g of dextrose, oral, PRN **OR** dextrose 40 % oral gel 15-30 g of dextrose, 15-30 g of dextrose, oral, PRN **OR** glucagon (GLUCAGEN) injection 1 mg, 1 mg, intramuscular, PRN **OR** dextrose in water injection 12.5 g, 25 mL, intravenous, PRN, Debbie Whitfield MD  •  enoxaparin (LOVENOX) syringe 40 mg, 0.5 mg/kg, subcutaneous, q12h (6a, 6p), Salinas Villalta, DO, 40 mg at 12/29/20 1727  •  melatonin capsule 5 mg, 5 mg, oral, Nightly PRN, Debbie Whitfield MD, 5 mg at 12/25/20 2319  •  multivitamin tablet 1 tablet, 1 tablet, oral, Daily, Debbie Whitfield MD, 1 tablet at 12/29/20 0836  •  ondansetron (ZOFRAN) injection 4 mg, 4 mg, intravenous, q8h PRN, Debbie Whitfield MD      Imaging/Radiology:  No radiological imaging available today    IMPRESSION AND PLAN :  65-year-old male admitted with hypoxic respiratory failure due to COVID-19 pneumonia currently being treated with dexamethasone and high flow nasal cannula at 45 L/min, FiO2 65% currently saturating 91% not a candidate for remdesivir based on symptoms more than 14 days.    Problem list  COVID-19 pneumonia  Acute hypoxic respiratory failure  Moderate ARDS  Former smoker    Recommendations:  -Continue Decadron 6 mg IV day 6 of total of 10-day course.  -Continue high flow nasal cannula to keep saturation between 92 to 94% and alternate with noninvasive ventilation if needed  -Intermediate level of DVT prophylaxis  -Modified tresing advised    Ron Anthony MD  Pulmonary and Critical Care

## 2020-12-31 ENCOUNTER — APPOINTMENT (INPATIENT)
Dept: RADIOLOGY | Facility: HOSPITAL | Age: 65
DRG: 177 | End: 2020-12-31
Attending: INTERNAL MEDICINE
Payer: MEDICARE

## 2020-12-31 LAB
ALBUMIN SERPL-MCNC: 2.7 G/DL (ref 3.4–5)
ALP SERPL-CCNC: 44 IU/L (ref 35–126)
ALT SERPL-CCNC: 99 IU/L (ref 16–63)
ANION GAP SERPL CALC-SCNC: 9 MEQ/L (ref 3–15)
AST SERPL-CCNC: 49 IU/L (ref 15–41)
BASOPHILS # BLD: 0 K/UL (ref 0.01–0.1)
BASOPHILS NFR BLD: 0 %
BILIRUB SERPL-MCNC: 1.1 MG/DL (ref 0.3–1.2)
BUN SERPL-MCNC: 19 MG/DL (ref 8–20)
CALCIUM SERPL-MCNC: 8.4 MG/DL (ref 8.9–10.3)
CHLORIDE SERPL-SCNC: 105 MEQ/L (ref 98–109)
CO2 SERPL-SCNC: 22 MEQ/L (ref 22–32)
CREAT SERPL-MCNC: 0.6 MG/DL (ref 0.8–1.3)
DIFFERENTIAL METHOD BLD: ABNORMAL
EOSINOPHIL # BLD: 0 K/UL (ref 0.04–0.54)
EOSINOPHIL NFR BLD: 0 %
ERYTHROCYTE [DISTWIDTH] IN BLOOD BY AUTOMATED COUNT: 13.2 % (ref 11.6–14.4)
FERRITIN SERPL-MCNC: 1964 NG/ML (ref 24–250)
GFR SERPL CREATININE-BSD FRML MDRD: >60 ML/MIN/1.73M*2
GIANT PLATELETS BLD QL SMEAR: ABNORMAL
GLUCOSE SERPL-MCNC: 163 MG/DL (ref 70–99)
HCT VFR BLDCO AUTO: 33.8 % (ref 40.1–51)
HGB BLD-MCNC: 11.4 G/DL (ref 13.7–17.5)
LYMPHOCYTES # BLD: 0.33 K/UL (ref 1.2–3.5)
LYMPHOCYTES NFR BLD: 10 %
MCH RBC QN AUTO: 29.9 PG (ref 28–33.2)
MCHC RBC AUTO-ENTMCNC: 33.7 G/DL (ref 32.2–36.5)
MCV RBC AUTO: 88.7 FL (ref 83–98)
MONOCYTES # BLD: 0.03 K/UL (ref 0.3–1)
MONOCYTES NFR BLD: 1 %
NEUTS BAND # BLD: 2.9 K/UL (ref 1.7–7)
NEUTS SEG NFR BLD: 87 %
OVALOCYTES BLD QL SMEAR: ABNORMAL
PDW BLD AUTO: 11.3 FL (ref 9.4–12.4)
PLATELET # BLD AUTO: 209 K/UL (ref 150–350)
PLATELET # BLD EST: ABNORMAL 10*3/UL
PLATELET CLUMP BLD QL SMEAR: PRESENT
POLYCHROMASIA BLD QL SMEAR: ABNORMAL
POTASSIUM SERPL-SCNC: 5.1 MEQ/L (ref 3.6–5.1)
PROT SERPL-MCNC: 6.2 G/DL (ref 6–8.2)
RBC # BLD AUTO: 3.81 M/UL (ref 4.5–5.8)
SODIUM SERPL-SCNC: 136 MEQ/L (ref 136–144)
VARIANT LYMPHS # BLD MANUAL: 0.07 K/UL
VARIANT LYMPHS NFR BLD: 2 %
WBC # BLD AUTO: 3.33 K/UL (ref 3.8–10.5)

## 2020-12-31 PROCEDURE — 80053 COMPREHEN METABOLIC PANEL: CPT | Performed by: HOSPITALIST

## 2020-12-31 PROCEDURE — 63700000 HC SELF-ADMINISTRABLE DRUG: Performed by: HOSPITALIST

## 2020-12-31 PROCEDURE — 63600000 HC DRUGS/DETAIL CODE: Performed by: INTERNAL MEDICINE

## 2020-12-31 PROCEDURE — 20600000 HC ROOM AND CARE INTERMEDIATE/TELEMETRY

## 2020-12-31 PROCEDURE — 25800000 HC PHARMACY IV SOLUTIONS: Performed by: INTERNAL MEDICINE

## 2020-12-31 PROCEDURE — 71045 X-RAY EXAM CHEST 1 VIEW: CPT

## 2020-12-31 PROCEDURE — 99233 SBSQ HOSP IP/OBS HIGH 50: CPT | Mod: CR | Performed by: INTERNAL MEDICINE

## 2020-12-31 PROCEDURE — 85025 COMPLETE CBC W/AUTO DIFF WBC: CPT | Performed by: HOSPITALIST

## 2020-12-31 PROCEDURE — 27900059 OXYGEN DAILY

## 2020-12-31 RX ORDER — MORPHINE SULFATE 2 MG/ML
1 INJECTION, SOLUTION INTRAMUSCULAR; INTRAVENOUS
Status: DISCONTINUED | OUTPATIENT
Start: 2020-12-31 | End: 2021-01-12 | Stop reason: HOSPADM

## 2020-12-31 RX ADMIN — THERA TABS 1 TABLET: TAB at 09:21

## 2020-12-31 RX ADMIN — ENOXAPARIN SODIUM 40 MG: 40 INJECTION SUBCUTANEOUS at 05:51

## 2020-12-31 RX ADMIN — DEXAMETHASONE SODIUM PHOSPHATE 20 MG: 4 INJECTION, SOLUTION INTRA-ARTICULAR; INTRALESIONAL; INTRAMUSCULAR; INTRAVENOUS; SOFT TISSUE at 09:23

## 2020-12-31 RX ADMIN — CEFTRIAXONE SODIUM 1 G: 1 INJECTION, POWDER, FOR SOLUTION INTRAMUSCULAR; INTRAVENOUS at 18:01

## 2020-12-31 RX ADMIN — ATORVASTATIN CALCIUM 10 MG: 10 TABLET, FILM COATED ORAL at 17:17

## 2020-12-31 RX ADMIN — ENOXAPARIN SODIUM 40 MG: 40 INJECTION SUBCUTANEOUS at 17:16

## 2020-12-31 RX ADMIN — ASPIRIN 81 MG: 81 TABLET, COATED ORAL at 09:18

## 2020-12-31 NOTE — PATIENT CARE CONFERENCE
Care Progression Rounds Note  Date: 12/31/2020  Time: 2:40 PM     Patient Name: Nadeem Morales     Medical Record Number: [id number]   YOB: 1955  Sex: Male      Room/Bed: 4507W    Admitting Diagnosis: Shortness of breath [R06.02]  Hypoxia [R09.02]  COVID-19 virus infection [U07.1]  Pneumonia due to COVID-19 virus [U07.1, J12.89]   Admit Date/Time: 12/24/2020  8:28 PM    Primary Diagnosis: Pneumonia due to COVID-19 virus  Principal Problem: Pneumonia due to COVID-19 virus    GMLOS: 5.4  Anticipated Discharge Date: 1/1/2021    AM-PAC  Mobility Score:      Discharge Planning:  Living Arrangements: house  Anticipated Discharge Disposition: home without services    Barriers to Discharge:  Barriers to Discharge: Medical issues not resolved  Comment: no change in 02 requirement    Participants:  nursing

## 2020-12-31 NOTE — PLAN OF CARE
Plan of Care Review  Plan of Care Reviewed With: patient  Progress: improving  Outcome Summary: Patient bradycardic at times, but vitals otherwise stable on HHFNC. IV antibiotics and Decadron administered as ordered. Patient DMOINGUEZ/SOB and desats (mid-80s) with movement. Will continue to monitor.

## 2020-12-31 NOTE — PROGRESS NOTES
Hospital Medicine Service -  Daily Progress Note       SUBJECTIVE   Interval History: Complains of shortness of breath and dyspnea on exertion.  Has a good appetite.  Generalized weakness.     OBJECTIVE      Vital signs in last 24 hours:  Temp:  [36.2 °C (97.1 °F)-36.9 °C (98.5 °F)] 36.6 °C (97.9 °F)  Heart Rate:  [47-63] 62  Resp:  [20] 20  BP: (101-117)/(53-69) 113/58  FiO2 (%) (Set):  [80 %-90 %] 80 %    Intake/Output Summary (Last 24 hours) at 12/31/2020 1603  Last data filed at 12/31/2020 1400  Gross per 24 hour   Intake 760 ml   Output 1250 ml   Net -490 ml       PHYSICAL EXAMINATION      Constitutional : appears in mild to moderate acute distress  Eyes : Extraocular movements are intact, pupils are equal in size and reactive to light bilaterally.    ENMT: JVD is not enlarged.  No lesions on oral mucosa.  Head is atraumatic  Respiratory : Few bilateral wheezes and distant breath sounds to auscultation bilaterally  Cardiovascular : Normal first and second heart sounds.  No heart murmurs  GI : Soft, nontender.  There is no organomegaly.  Bowel sounds are normal  Musculoskeletal : Normal range of motion  Extremity: There is no cyanosis, clubbing or edema.  Distal pulses are 2+ bilateral dorsalis pedis  Skin: Warm to touch.  No rashes, no ulcers  Psychiatry: Patient is cooperative, anxious  Neurological: Awake alert and oriented x 3.  Motor strength is equal bilateral upper and lower extremities.  Sensations are intact bilaterally. CN 2-12 grossly intact     LABS / IMAGING / TELE      Labs  Lab Results   Component Value Date    WBC 3.33 (L) 12/31/2020    HGB 11.4 (L) 12/31/2020    HCT 33.8 (L) 12/31/2020    MCV 88.7 12/31/2020     12/31/2020     Lab Results   Component Value Date    GLUCOSE 163 (H) 12/31/2020    CALCIUM 8.4 (L) 12/31/2020     12/31/2020    K 5.1 12/31/2020    CO2 22 12/31/2020     12/31/2020    BUN 19 12/31/2020    CREATININE 0.6 (L) 12/31/2020         Results from last 7  days   Lab Units 12/25/20  0001   MAGNESIUM mg/dL 2.0         Results from last 7 days   Lab Units 12/31/20  0521 12/30/20  0512 12/29/20  0534   ALBUMIN g/dL 2.7* 2.5* 2.7*                 Results from last 7 days   Lab Units 12/31/20  0521 12/30/20  0512 12/29/20  0534   SODIUM mEQ/L 136 134* 135*   POTASSIUM mEQ/L 5.1 4.5 4.4   CHLORIDE mEQ/L 105 104 103   CO2 mEQ/L 22 22 23   BUN mg/dL 19 20 21*   CREATININE mg/dL 0.6* 0.6* 0.7*   CALCIUM mg/dL 8.4* 8.1* 8.1*   ALBUMIN g/dL 2.7* 2.5* 2.7*   BILIRUBIN TOTAL mg/dL 1.1 1.2 1.3*   ALK PHOS IU/L 44 40 39   ALT IU/L 99* 92* 108*   AST IU/L 49* 50* 61*   GLUCOSE mg/dL 163* 98 100*             Results from last 7 days   Lab Units 12/25/20  0001   INR INR 1.1   PTT sec 37*     Results from last 7 days   Lab Units 12/31/20  0521 12/30/20  0512 12/29/20  0534   ALK PHOS IU/L 44 40 39   BILIRUBIN TOTAL mg/dL 1.1 1.2 1.3*   ALBUMIN g/dL 2.7* 2.5* 2.7*   ALT IU/L 99* 92* 108*   AST IU/L 49* 50* 61*     No results found for: TROPONINT          Results from last 7 days   Lab Units 12/30/20  1835 12/30/20  0512 12/28/20  0456   FERRITIN ng/mL 1,964* 1,759* 2,127*       Imaging  Significant findings include: Interval progression in left mid to lower lobe airspace opacities when compared to prior study  Stable right mid to lower lung zone patchy airspace opacity      ECG/Telemetry  I have independently reviewed the telemetry. Significant findings include Sinus bradycardia rate 58/min.  LINES, CATHETERS, DRAINS, AIRWAYS, AND WOUNDS   Lines, Drains, Airways, Wounds:  Peripheral IV 12/30/20 Anterior;Left Forearm (Active)   Number of days: 1       Comments:          ASSESSMENT AND PLAN      * Pneumonia due to COVID-19 virus  Assessment & Plan  Covid pneumonia with acute hypoxic respiratory failure  Chest x-ray with progressive patchy airspace opacities noted bilaterally  Continue Decadron  Not a candidate for remdesivir as symptoms started 13 days prior to admission  Discussed with  infectious disease.  Continue current dose of Decadron  Currently on heated high flow 80% FiO2  With high oxygen requirements rule out secondary bacterial pneumonia  Follow cultures  Empiric antibiotic started per infectious disease      Acute respiratory failure with hypoxia (CMS/HCC)  Assessment & Plan  Due to COVID-19  Prone protocol  Chest x-ray with progressive patchy airspace opacities  Pulmonary, infectious disease following    Pancytopenia (CMS/HCC)  Assessment & Plan  Likely related to acute infection  Monitor CBC w/daily labs  Consult heme/onc, signed off now that numbers are improving  Improving daily    Acute hyponatremia  Assessment & Plan  Hypovolemic hyponatremia resolved  Monitor sodium levels  Encourage p.o. intake    Coronary artery disease involving native coronary artery of native heart without angina pectoris  Assessment & Plan  Chronic, asymptomatic  Continue Lipitor, ASA       VTE Assessment: Padua VTE Score: 1  VTE Prophylaxis Plan: Lovenox   Code Status: Full Code  Estimated Discharge Date: 1/1/2021  Disposition Planning: Add morphine for shortness of breath as needed.  Started on empiric antibiotics by infectious disease consultant.     Reagan Haque MD  12/31/2020

## 2020-12-31 NOTE — CONSULTS
Brief Nutrition Assessment Reason for consult: covid/los     Nutrition Interventions/Recommendations:   1. Continue with 2gmK+ diet as tolerated  2. Weekly weights  3. Consider daily multivitamin   4. Supplements if po <50%    Monitor:  1. % PO intake  2. Diet tolerance  3. Weight changes  4. Labs-replete prn  5. Skin integrity  6. Bowel function    Goals:         1. To consume and tolerate >/= 75% of estimated needs via goal diet     Clinical Course: Patient is a 65 y.o. male who was admitted on 12/24/2020 with a diagnosis of Shortness of breath [R06.02]  Hypoxia [R09.02]  COVID-19 virus infection [U07.1]  Pneumonia due to COVID-19 virus [U07.1, J12.89]. 65 yr old male admitted with covid, on decadron.  Chest x-ray with progressive patchy airspace opacities. ON HHFNC-on IV abx.      Nutrition Focused Physical Exam: Per [facility] COVID pt visitation restrictions, RD not able to enter room.   BMI 24.96 WDL.         Dietary Orders   (From admission, onward)             Start     Ordered    12/31/20 0825  Adult Diet Regular; 2gm Potassium; RD/LDN may adjust order  Diet effective now     Question Answer Comment   Diet Texture Regular    Renal Restrictions: 2gm Potassium    Delegation of Authority. Diet orders written by PA/CRGilberto may not be adjusted by RD/LDNs. RD/LDN may adjust order        12/31/20 0825              Weights (last 7 days)     Date/Time   Weight    12/31/20 1000   76.7 kg (169 lb 1.5 oz)    Weight: per nsg at 12/31/20 1000    12/25/20 0040   76.7 kg (169 lb 1.5 oz)    12/24/20 2032   76.7 kg (169 lb 3.2 oz)            Wt Readings from Last 3 Encounters:   12/31/20 76.7 kg (169 lb 1.5 oz)   04/13/18 73 kg (161 lb)       Reason for Assessment  Reason For Assessment: diagnosis/disease state(covid)  Patient Already Seen On: 12/31/20  Diagnosis: other (see comments)(sob, covid)    [facility] Nutrition Screen Tool  Has patient lost weight without trying?: 0-->No  If yes,how much weight has been lost?: 0-->Patient has  "not lost weight  Has patient been eating poorly due to decreased appetite?: 0-->No  [facility] Nutrition Screen Score: 0    Nutrition/Diet History  Intake (%): 75%  Food Allergies: other (see comments)(nkfa)    Physical Findings  Overall Physical Appearance: other (see comments)(unable to assess due to covid)  Last Bowel Movement: 12/30/20  Skin: other (see comments)(intact. no edema per nsg)    Last Bowel Movement: 12/30/20    Nutrition Order  Nutrition Order Review: meets nutritional requirements  Nutrition Order Comments: 2gmK+    Anthropometrics  Height: 175.3 cm (5' 9.02\")(per nsg)           Current Weight  Weight Method: Bed scale  Weight: 76.7 kg (169 lb 1.5 oz)(per nsg)    Ideal Body Weight (IBW)  Ideal Body Weight (IBW) (kg): 73.73  % Ideal Body Weight: 104.03    Usual Body Weight (UBW)  Usual Body Weight: 73 kg (161 lb)(per emr april 2018)  % Usual Body Weight: 105.03  % of Usual Body Weight Assessment: not applicable  Weight Loss: unintentional  Time Frame: >1 Year  Body Mass Index (BMI)  BMI (Calculated): 25  BMI (kg/m2): 25.01  BMI Assessment: BMI 25-29.9: overweight                        Labs/Procedures/Meds  Lab Results Reviewed: reviewed, pertinent  Lab Results Comments:     Results from last 7 days   Lab Units 12/31/20  0521 12/30/20  0512 12/29/20  0534   SODIUM mEQ/L 136 134* 135*   POTASSIUM mEQ/L 5.1 4.5 4.4   CHLORIDE mEQ/L 105 104 103   CO2 mEQ/L 22 22 23   BUN mg/dL 19 20 21*   CREATININE mg/dL 0.6* 0.6* 0.7*   GLUCOSE mg/dL 163* 98 100*   CALCIUM mg/dL 8.4* 8.1* 8.1*      Results from last 7 days   Lab Units 12/31/20  0521 12/30/20  0512 12/29/20  0534   ALK PHOS IU/L 44 40 39   BILIRUBIN TOTAL mg/dL 1.1 1.2 1.3*   ALBUMIN g/dL 2.7* 2.5* 2.7*   ALT IU/L 99* 92* 108*   AST IU/L 49* 50* 61*          No results found for: HGBA1C  No results found for: HEUEFAYY23  Lab Results   Component Value Date    CALCIUM 8.4 (L) 12/31/2020    PHOS 2.6 12/25/2020     Results from last 7 days   Lab Units " 12/31/20  0521 12/30/20  0512 12/29/20  0534   WBC K/uL 3.33* 6.26 5.00   HEMOGLOBIN g/dL 11.4* 10.9* 11.3*   HEMATOCRIT % 33.8* 31.9* 33.1*   PLATELETS K/uL 209 291 150               Results from last 7 days   Lab Units 12/25/20  0001   MAGNESIUM mg/dL 2.0        Diagnostic Tests/Procedures  Diagnostic Test/Procedure Reviewed: reviewed, pertinent  Diagnostic Test/Procedures Comments: covid    Medications  Pertinent Medications Reviewed: reviewed, pertinent  Pertinent Medications Comments: lipitor, decadron, mvi, rocephin  • aspirin  81 mg oral Daily   • atorvastatin  10 mg oral Daily (6p)   • cefTRIAXone  1 g intravenous q24h INT   • dexamethasone  20 mg intravenous Daily   • enoxaparin  0.5 mg/kg subcutaneous q12h (6a, 6p)   • multivitamin  1 tablet oral Daily         Nutritional Needs Met?: Yes(po 75%)  Nutrition Status Classification: Mild nutritional compromise(L1: )      Date: 12/31/20  Signature: Rosanna Xiong RD, NATASHAN

## 2021-01-01 LAB
ALBUMIN SERPL-MCNC: 2.5 G/DL (ref 3.4–5)
ALP SERPL-CCNC: 43 IU/L (ref 35–126)
ALT SERPL-CCNC: 97 IU/L (ref 16–63)
ANION GAP SERPL CALC-SCNC: 10 MEQ/L (ref 3–15)
ANISOCYTOSIS BLD QL SMEAR: ABNORMAL
AST SERPL-CCNC: 44 IU/L (ref 15–41)
BASOPHILS # BLD: 0 K/UL (ref 0.01–0.1)
BASOPHILS NFR BLD: 0 %
BILIRUB SERPL-MCNC: 0.9 MG/DL (ref 0.3–1.2)
BUN SERPL-MCNC: 19 MG/DL (ref 8–20)
CALCIUM SERPL-MCNC: 8.5 MG/DL (ref 8.9–10.3)
CHLORIDE SERPL-SCNC: 104 MEQ/L (ref 98–109)
CK SERPL-CCNC: 21 U/L (ref 16–300)
CO2 SERPL-SCNC: 22 MEQ/L (ref 22–32)
CREAT SERPL-MCNC: 0.6 MG/DL (ref 0.8–1.3)
CRP SERPL-MCNC: 32.4 MG/L
DIFFERENTIAL METHOD BLD: ABNORMAL
EOSINOPHIL # BLD: 0 K/UL (ref 0.04–0.54)
EOSINOPHIL NFR BLD: 0 %
ERYTHROCYTE [DISTWIDTH] IN BLOOD BY AUTOMATED COUNT: 13.1 % (ref 11.6–14.4)
FERRITIN SERPL-MCNC: 1871 NG/ML (ref 24–250)
GFR SERPL CREATININE-BSD FRML MDRD: >60 ML/MIN/1.73M*2
GLUCOSE SERPL-MCNC: 118 MG/DL (ref 70–99)
HCT VFR BLDCO AUTO: 33.3 % (ref 40.1–51)
HGB BLD-MCNC: 11.5 G/DL (ref 13.7–17.5)
IMM GRANULOCYTES # BLD AUTO: 0.03 K/UL (ref 0–0.08)
IMM GRANULOCYTES NFR BLD AUTO: 0.4 %
LDH SERPL L TO P-CCNC: 455 IU/L (ref 98–192)
LYMPHOCYTES # BLD: 0.74 K/UL (ref 1.2–3.5)
LYMPHOCYTES NFR BLD: 10.1 %
MCH RBC QN AUTO: 30.3 PG (ref 28–33.2)
MCHC RBC AUTO-ENTMCNC: 34.5 G/DL (ref 32.2–36.5)
MCV RBC AUTO: 87.6 FL (ref 83–98)
MONOCYTES # BLD: 0.28 K/UL (ref 0.3–1)
MONOCYTES NFR BLD: 3.8 %
NEUTROPHILS # BLD: 6.25 K/UL (ref 1.7–7)
NEUTS SEG NFR BLD: 85.7 %
NRBC BLD-RTO: 0 %
OVALOCYTES BLD QL SMEAR: ABNORMAL
PDW BLD AUTO: 10.5 FL (ref 9.4–12.4)
PLATELET # BLD AUTO: 422 K/UL (ref 150–350)
PLATELET # BLD EST: ABNORMAL 10*3/UL
PLATELET CLUMP BLD QL SMEAR: PRESENT
POTASSIUM SERPL-SCNC: 4.6 MEQ/L (ref 3.6–5.1)
PROT SERPL-MCNC: 6.1 G/DL (ref 6–8.2)
RBC # BLD AUTO: 3.8 M/UL (ref 4.5–5.8)
SODIUM SERPL-SCNC: 136 MEQ/L (ref 136–144)
WBC # BLD AUTO: 7.3 K/UL (ref 3.8–10.5)

## 2021-01-01 PROCEDURE — 99233 SBSQ HOSP IP/OBS HIGH 50: CPT | Performed by: INTERNAL MEDICINE

## 2021-01-01 PROCEDURE — 63600000 HC DRUGS/DETAIL CODE: Performed by: INTERNAL MEDICINE

## 2021-01-01 PROCEDURE — 80053 COMPREHEN METABOLIC PANEL: CPT | Performed by: HOSPITALIST

## 2021-01-01 PROCEDURE — 82728 ASSAY OF FERRITIN: CPT | Performed by: HOSPITALIST

## 2021-01-01 PROCEDURE — 25800000 HC PHARMACY IV SOLUTIONS: Performed by: INTERNAL MEDICINE

## 2021-01-01 PROCEDURE — 36415 COLL VENOUS BLD VENIPUNCTURE: CPT | Performed by: HOSPITALIST

## 2021-01-01 PROCEDURE — 82550 ASSAY OF CK (CPK): CPT | Performed by: HOSPITALIST

## 2021-01-01 PROCEDURE — 85025 COMPLETE CBC W/AUTO DIFF WBC: CPT | Performed by: HOSPITALIST

## 2021-01-01 PROCEDURE — 20600000 HC ROOM AND CARE INTERMEDIATE/TELEMETRY

## 2021-01-01 PROCEDURE — 86140 C-REACTIVE PROTEIN: CPT | Performed by: HOSPITALIST

## 2021-01-01 PROCEDURE — 27900059 OXYGEN DAILY

## 2021-01-01 PROCEDURE — 83615 LACTATE (LD) (LDH) ENZYME: CPT | Performed by: HOSPITALIST

## 2021-01-01 PROCEDURE — 63700000 HC SELF-ADMINISTRABLE DRUG: Performed by: HOSPITALIST

## 2021-01-01 RX ADMIN — ENOXAPARIN SODIUM 40 MG: 40 INJECTION SUBCUTANEOUS at 17:36

## 2021-01-01 RX ADMIN — ENOXAPARIN SODIUM 40 MG: 40 INJECTION SUBCUTANEOUS at 06:22

## 2021-01-01 RX ADMIN — ASPIRIN 81 MG: 81 TABLET, COATED ORAL at 08:48

## 2021-01-01 RX ADMIN — THERA TABS 1 TABLET: TAB at 08:48

## 2021-01-01 RX ADMIN — CEFTRIAXONE SODIUM 1 G: 1 INJECTION, POWDER, FOR SOLUTION INTRAMUSCULAR; INTRAVENOUS at 17:36

## 2021-01-01 RX ADMIN — ATORVASTATIN CALCIUM 10 MG: 10 TABLET, FILM COATED ORAL at 17:36

## 2021-01-01 RX ADMIN — DEXAMETHASONE SODIUM PHOSPHATE 20 MG: 4 INJECTION, SOLUTION INTRA-ARTICULAR; INTRALESIONAL; INTRAMUSCULAR; INTRAVENOUS; SOFT TISSUE at 08:49

## 2021-01-01 NOTE — PROGRESS NOTES
Pulmonary Daily Note    SUBJECTIVE:   Patient seen and examined. Remains on HHFNC w/ 50L/55%. Patient states he feels much improved though still with fatigue w/ minimal exertion. Anxious to be transitioned back to low flow canula.     Review of Systems:   Unchanged unless otherwise noted.    Allergies:   Patient has no known allergies.    Current Facility-Administered Medications   Medication Dose Route Frequency   • acetaminophen  650 mg oral q4h PRN    Or   • acetaminophen  650 mg rectal q4h PRN    Or   • acetaminophen  650 mg oral q4h PRN   • albuterol HFA  2 puff inhalation q6h PRN   • aspirin  81 mg oral Daily   • atorvastatin  10 mg oral Daily (6p)   • cefTRIAXone  1 g intravenous q24h INT   • dexamethasone  20 mg intravenous Daily   • glucose  16-32 g of dextrose oral PRN    Or   • dextrose  15-30 g of dextrose oral PRN    Or   • glucagon  1 mg intramuscular PRN    Or   • dextrose in water  25 mL intravenous PRN   • enoxaparin  0.5 mg/kg subcutaneous q12h (6a, 6p)   • melatonin  5 mg oral Nightly PRN   • morphine  1 mg intravenous q3h PRN   • multivitamin  1 tablet oral Daily   • ondansetron  4 mg intravenous q8h PRN       OBJECTIVE:     Patient Vitals for the past 24 hrs:   BP Temp Temp src Pulse Resp SpO2   01/01/21 1528 108/63 36.4 °C (97.6 °F) Oral 70 20 92 %   01/01/21 1129 107/63 36.4 °C (97.6 °F) Oral 79 20 (!) 91 %   01/01/21 1107 -- -- -- -- 20 (!) 91 %   01/01/21 0800 -- -- -- -- -- 92 %   01/01/21 0754 99/66 36.6 °C (97.8 °F) Oral (!) 50 20 93 %   01/01/21 0400 118/69 36.5 °C (97.7 °F) Oral (!) 55 20 (!) 91 %   01/01/21 0000 118/68 36.4 °C (97.6 °F) Oral 83 20 96 %   12/31/20 2100 -- -- -- -- -- 97 %   12/31/20 2000 115/64 36.5 °C (97.7 °F) Oral 63 20 97 %     Oxygen Requirement: HHFNC 50L/55%      Intake/Output Summary (Last 24 hours) at 1/1/2021 1727  Last data filed at 1/1/2021 0500  Intake --   Output 1500 ml   Net -1500 ml       Physical Exam:  General: NAD, comfortable on HHFNC  HEENT:  Moist membranes, PERRL   Neck: Supple, no JVD, no tug or stridor, trach midline  Cardiac: Bradycardic, regular, +S1/S2, no murmur appreciated  Lungs: Coarse lungs bilaterally, LLL crackles.  Respirations not labored  Abdomen: Soft, NT/ND, +BS, no rebound/guarding   : Deferred  Extremities: No edema, clubbing or cyanosis  Musculoskeletal: No joint deformity  Vascular: No ischemia noted  Neuro: AAOx3, nonfocal  Skin: Warm, limited exam, defer to attending/nursing  Psych: Cooperative, appropriate       Labs:    CBC Results       01/01/21 12/31/20 12/30/20                    0653 0521 0512         WBC 7.30 3.33 6.26         RBC 3.80 3.81 3.62         HGB 11.5 11.4 10.9         HCT 33.3 33.8 31.9         MCV 87.6 88.7 88.1         MCH 30.3 29.9 30.1         MCHC 34.5 33.7 34.2          209 291         Comment for WBC at 0653 on 01/01/21: ALL RESULTS HAVE BEEN CHECKED    Comment for PLT at 0653 on 01/01/21: RESULTS CHECKED. SPECIMEN QUALITY CHECKED    Comment for PLT at 0512 on 12/30/20: RESULTS CHECKED. CONFIRMED WITH SMEAR ESTIMATE        CMP Results       01/01/21 12/31/20 12/30/20                    0653 0521 0512          136 134         K 4.6 5.1 4.5         Cl 104 105 104         CO2 22 22 22         Glucose 118 163 98         BUN 19 19 20         Creatinine 0.6 0.6 0.6         Calcium 8.5 8.4 8.1         Anion Gap 10 9 8         AST 44 49 50         ALT 97 99 92         Albumin 2.5 2.7 2.5         EGFR >60.0 >60.0 >60.0                     PT PTT Results       12/25/20 12/22/20                       0001 1124          PT 14.0 14.3          INR 1.1 1.1          PTT 37 32          Comment for INR at 0001 on 12/25/20: INR has no defined significance when PT is within Reference Range.    Comment for INR at 1124 on 12/22/20: INR has no defined significance when PT is within Reference Range.    Comment for PTT at 0001 on 12/25/20: The Standard Therapeutic Range for Heparin is 68 to 101 seconds.        ABG  Results     No lab values to display.        Microbiology Results     Procedure Component Value Units Date/Time    Blood Culture Blood, Venous [[id number]] Collected: 12/30/20 1835    Specimen: Blood, Venous Updated: 12/31/20 2300     Culture No growth at 18-24 hours    Blood Culture Blood, Venous [[id number]] Collected: 12/24/20 2042    Specimen: Blood, Venous Updated: 12/29/20 2301     Culture No growth at 120 hours    Blood Culture Blood, Venous [[id number]] Collected: 12/24/20 2042    Specimen: Blood, Venous Updated: 12/29/20 2301     Culture No growth at 120 hours          Imaging/Radiology:  No chest imaging to review.     Telemetry: SB at 44bpm    IMPRESSION AND PLAN :  Acute Hypoxic Respiratory Failure  - Not O2 dependent at baseline.  Oxygen requirements since secondary to COVID infection   - Continue supplemental oxygen w/ HHFNC to maintain SpO2 90-96%  -- Anticipate will be able to trial salter canula in next 24-48hrs   - Titrate FiO2 to maintain SpO2 90-96% and/or PaO2 >=60mmHg.  Will tolerate sats in the mid-80s or better  -- Mortality of intubation incredibly high. Therefore high threshold for intubation.   - Repeat ABG and CXR prn.   - Encourage modified proning and incentive spirometry encouraged     Coronavirus (COVID-19) Infection  - PCR positive on 12/14.   - Maintain contact and droplet precautions  - Monitor inflammatory markers intermittently  - Decadron increased to 20mg 12/31 daily until 1/4 then decrease to 10mg daily x5 days until 1/9 or discharge   - ID following. Supportive care  - DVT ppx w/ LMWH 0.5mg/kg BID   - Albuterol HFA PRN   - Outpatient follow up imaging in 6-8 weeks to ensure resolution of infiltrates.     Former Smoker  - 30 pack year history. Quit 26 years ago     -- Additional history per EMR/attending       Case discussed with: patient

## 2021-01-01 NOTE — PROGRESS NOTES
Hospital Medicine Service -  Daily Progress Note       SUBJECTIVE   Interval History: Patient denies any complaints other than shortness of breath.  Has a good appetite     OBJECTIVE      Vital signs in last 24 hours:  Temp:  [36.2 °C (97.1 °F)-36.7 °C (98.1 °F)] 36.6 °C (97.8 °F)  Heart Rate:  [50-83] 50  Resp:  [20] 20  BP: ()/(57-69) 99/66  FiO2 (%) (Set):  [55 %-90 %] 55 %    Intake/Output Summary (Last 24 hours) at 1/1/2021 1035  Last data filed at 1/1/2021 0500  Gross per 24 hour   Intake 360 ml   Output 1500 ml   Net -1140 ml       PHYSICAL EXAMINATION      Constitutional : appears in mild acute distress  Eyes : Extraocular movements are intact, pupils are equal in size and reactive to light bilaterally.    ENMT: JVD is not enlarged.  No lesions on oral mucosa.  Head is atraumatic  Respiratory : Distant breath sounds and decreased at lung bases.  No wheezes or crackles to auscultation bilaterally  Cardiovascular : Normal first and second heart sounds.  No heart murmurs  GI : Soft, nontender.  There is no organomegaly.  Bowel sounds are normal  Musculoskeletal : Normal range of motion  Extremity: There is no cyanosis, clubbing or edema.  Distal pulses are 2+ bilateral dorsalis pedis  Skin: Warm to touch.  No rashes, no ulcers  Psychiatry: Patient is cooperative, appropriate.  No delusions or hallucinations  Neurological: Awake alert and oriented x 3.  Motor strength is equal bilateral upper and lower extremities.  Sensations are intact bilaterally. CN 2-12 grossly intact     LABS / IMAGING / TELE      Labs  Lab Results   Component Value Date    WBC 7.30 01/01/2021    HGB 11.5 (L) 01/01/2021    HCT 33.3 (L) 01/01/2021    MCV 87.6 01/01/2021     (H) 01/01/2021     Lab Results   Component Value Date    GLUCOSE 118 (H) 01/01/2021    CALCIUM 8.5 (L) 01/01/2021     01/01/2021    K 4.6 01/01/2021    CO2 22 01/01/2021     01/01/2021    BUN 19 01/01/2021    CREATININE 0.6 (L) 01/01/2021                  Results from last 7 days   Lab Units 01/01/21  0653 12/31/20  0521 12/30/20  0512   ALBUMIN g/dL 2.5* 2.7* 2.5*                 Results from last 7 days   Lab Units 01/01/21  0653 12/31/20  0521 12/30/20  0512   SODIUM mEQ/L 136 136 134*   POTASSIUM mEQ/L 4.6 5.1 4.5   CHLORIDE mEQ/L 104 105 104   CO2 mEQ/L 22 22 22   BUN mg/dL 19 19 20   CREATININE mg/dL 0.6* 0.6* 0.6*   CALCIUM mg/dL 8.5* 8.4* 8.1*   ALBUMIN g/dL 2.5* 2.7* 2.5*   BILIRUBIN TOTAL mg/dL 0.9 1.1 1.2   ALK PHOS IU/L 43 44 40   ALT IU/L 97* 99* 92*   AST IU/L 44* 49* 50*   GLUCOSE mg/dL 118* 163* 98                 Results from last 7 days   Lab Units 01/01/21  0653 12/31/20  0521 12/30/20  0512   ALK PHOS IU/L 43 44 40   BILIRUBIN TOTAL mg/dL 0.9 1.1 1.2   ALBUMIN g/dL 2.5* 2.7* 2.5*   ALT IU/L 97* 99* 92*   AST IU/L 44* 49* 50*     No results found for: TROPONINT          Results from last 7 days   Lab Units 12/30/20  1835 12/30/20  0512 12/28/20  0456   FERRITIN ng/mL 1,964* 1,759* 2,127*       Imaging  Significant findings include: Interval progression and left mid to upper lobe airspace opacity.  Stable right mid to lower lung zone patchy airspace opacity      ECG/Telemetry  I have independently reviewed the telemetry. Significant findings include Sinus bradycardia rate 50/min.  LINES, CATHETERS, DRAINS, AIRWAYS, AND WOUNDS   Lines, Drains, Airways, Wounds:  Peripheral IV 12/30/20 Anterior;Left Forearm (Active)   Number of days: 2       Comments:          ASSESSMENT AND PLAN      * Pneumonia due to COVID-19 virus  Assessment & Plan  Covid pneumonia with acute hypoxic respiratory failure  Chest x-ray with progressive patchy airspace opacities noted bilaterally  Continue Decadron  Not a candidate for remdesivir as symptoms started 13 days prior to admission  With high oxygen requirements rule out secondary bacterial pneumonia  Cultures no growth so far  Empiric antibiotic started per infectious disease  Started on high-dose Decadron  by pulmonary  Requiring heated high flow 92% FiO2    Acute respiratory failure with hypoxia (CMS/HCC)  Assessment & Plan  Due to COVID-19  Prone protocol  Chest x-ray with progressive patchy airspace opacities  Pulmonary, infectious disease following    Pancytopenia (CMS/HCC)  Assessment & Plan  Likely related to acute infection  Monitor CBC w/daily labs  Consult heme/onc, signed off now that numbers are improving  Improving daily    Acute hyponatremia  Assessment & Plan  Hypovolemic hyponatremia resolved  Monitor sodium levels  Encourage p.o. intake    Coronary artery disease involving native coronary artery of native heart without angina pectoris  Assessment & Plan  Chronic, asymptomatic  Continue Lipitor, ASA       VTE Assessment: Padua VTE Score: 1  VTE Prophylaxis Plan: Lovenox  Code Status: Full Code  Estimated Discharge Date: 1/6/2021  Disposition Planning: Still on heated high flow of 92% FiO2.  Continue to monitor oxygen requirements.  Supportive care.  High-dose Decadron.     Reagan Haque MD  1/1/2021

## 2021-01-02 LAB
ALBUMIN SERPL-MCNC: 2.7 G/DL (ref 3.4–5)
ALP SERPL-CCNC: 47 IU/L (ref 35–126)
ALT SERPL-CCNC: 100 IU/L (ref 16–63)
ANION GAP SERPL CALC-SCNC: 9 MEQ/L (ref 3–15)
AST SERPL-CCNC: 42 IU/L (ref 15–41)
BASOPHILS # BLD: 0.01 K/UL (ref 0.01–0.1)
BASOPHILS NFR BLD: 0.2 %
BILIRUB SERPL-MCNC: 1 MG/DL (ref 0.3–1.2)
BUN SERPL-MCNC: 21 MG/DL (ref 8–20)
CALCIUM SERPL-MCNC: 8.7 MG/DL (ref 8.9–10.3)
CHLORIDE SERPL-SCNC: 103 MEQ/L (ref 98–109)
CO2 SERPL-SCNC: 25 MEQ/L (ref 22–32)
CREAT SERPL-MCNC: 0.5 MG/DL (ref 0.8–1.3)
DIFFERENTIAL METHOD BLD: ABNORMAL
EOSINOPHIL # BLD: 0 K/UL (ref 0.04–0.54)
EOSINOPHIL NFR BLD: 0 %
ERYTHROCYTE [DISTWIDTH] IN BLOOD BY AUTOMATED COUNT: 13.2 % (ref 11.6–14.4)
GFR SERPL CREATININE-BSD FRML MDRD: >60 ML/MIN/1.73M*2
GLUCOSE SERPL-MCNC: 112 MG/DL (ref 70–99)
HCT VFR BLDCO AUTO: 35.6 % (ref 40.1–51)
HGB BLD-MCNC: 12 G/DL (ref 13.7–17.5)
IMM GRANULOCYTES # BLD AUTO: 0.05 K/UL (ref 0–0.08)
IMM GRANULOCYTES NFR BLD AUTO: 0.8 %
LYMPHOCYTES # BLD: 0.7 K/UL (ref 1.2–3.5)
LYMPHOCYTES NFR BLD: 10.6 %
MCH RBC QN AUTO: 30.7 PG (ref 28–33.2)
MCHC RBC AUTO-ENTMCNC: 33.7 G/DL (ref 32.2–36.5)
MCV RBC AUTO: 91 FL (ref 83–98)
MONOCYTES # BLD: 0.19 K/UL (ref 0.3–1)
MONOCYTES NFR BLD: 2.9 %
NEUTROPHILS # BLD: 5.66 K/UL (ref 1.7–7)
NEUTS SEG NFR BLD: 85.5 %
NRBC BLD-RTO: 0 %
PDW BLD AUTO: 11.9 FL (ref 9.4–12.4)
PLATELET # BLD AUTO: 285 K/UL (ref 150–350)
POTASSIUM SERPL-SCNC: 4.9 MEQ/L (ref 3.6–5.1)
PROT SERPL-MCNC: 6.7 G/DL (ref 6–8.2)
RBC # BLD AUTO: 3.91 M/UL (ref 4.5–5.8)
SODIUM SERPL-SCNC: 137 MEQ/L (ref 136–144)
WBC # BLD AUTO: 6.61 K/UL (ref 3.8–10.5)

## 2021-01-02 PROCEDURE — 63600000 HC DRUGS/DETAIL CODE: Performed by: INTERNAL MEDICINE

## 2021-01-02 PROCEDURE — 85025 COMPLETE CBC W/AUTO DIFF WBC: CPT | Performed by: HOSPITALIST

## 2021-01-02 PROCEDURE — 27900059 OXYGEN DAILY

## 2021-01-02 PROCEDURE — 25800000 HC PHARMACY IV SOLUTIONS: Performed by: INTERNAL MEDICINE

## 2021-01-02 PROCEDURE — 80053 COMPREHEN METABOLIC PANEL: CPT | Performed by: HOSPITALIST

## 2021-01-02 PROCEDURE — 99233 SBSQ HOSP IP/OBS HIGH 50: CPT | Performed by: INTERNAL MEDICINE

## 2021-01-02 PROCEDURE — 63700000 HC SELF-ADMINISTRABLE DRUG: Performed by: HOSPITALIST

## 2021-01-02 PROCEDURE — 20600000 HC ROOM AND CARE INTERMEDIATE/TELEMETRY

## 2021-01-02 RX ORDER — DEXAMETHASONE SODIUM PHOSPHATE 4 MG/ML
10 INJECTION, SOLUTION INTRA-ARTICULAR; INTRALESIONAL; INTRAMUSCULAR; INTRAVENOUS; SOFT TISSUE DAILY
Status: COMPLETED | OUTPATIENT
Start: 2021-01-03 | End: 2021-01-07

## 2021-01-02 RX ADMIN — THERA TABS 1 TABLET: TAB at 08:19

## 2021-01-02 RX ADMIN — ENOXAPARIN SODIUM 40 MG: 40 INJECTION SUBCUTANEOUS at 06:09

## 2021-01-02 RX ADMIN — ASPIRIN 81 MG: 81 TABLET, COATED ORAL at 08:19

## 2021-01-02 RX ADMIN — CEFTRIAXONE SODIUM 1 G: 1 INJECTION, POWDER, FOR SOLUTION INTRAMUSCULAR; INTRAVENOUS at 17:19

## 2021-01-02 RX ADMIN — ATORVASTATIN CALCIUM 10 MG: 10 TABLET, FILM COATED ORAL at 17:19

## 2021-01-02 RX ADMIN — ENOXAPARIN SODIUM 40 MG: 40 INJECTION SUBCUTANEOUS at 17:19

## 2021-01-02 RX ADMIN — DEXAMETHASONE SODIUM PHOSPHATE 20 MG: 4 INJECTION, SOLUTION INTRA-ARTICULAR; INTRALESIONAL; INTRAMUSCULAR; INTRAVENOUS; SOFT TISSUE at 08:19

## 2021-01-02 NOTE — PROGRESS NOTES
Hospital Medicine Service -  Daily Progress Note       SUBJECTIVE   Interval History: Patient denies any new complaints.  Feels like he is improving but still gets short of breath     OBJECTIVE      Vital signs in last 24 hours:  Temp:  [36.4 °C (97.6 °F)-37.2 °C (98.9 °F)] 36.6 °C (97.9 °F)  Heart Rate:  [51-71] 56  Resp:  [18-20] 18  BP: ()/(48-74) 108/58  FiO2 (%) (Set):  [35 %-55 %] 35 %    Intake/Output Summary (Last 24 hours) at 1/2/2021 1240  Last data filed at 1/1/2021 2000  Gross per 24 hour   Intake --   Output 700 ml   Net -700 ml       PHYSICAL EXAMINATION      Constitutional : appears in mild acute distress  Eyes : Extraocular movements are intact, pupils are equal in size and reactive to light bilaterally.    ENMT: JVD is not enlarged.  No lesions on oral mucosa.  Head is atraumatic  Respiratory : Decreased breath sounds lung bases few diffuse rhonchi to auscultation bilaterally  Cardiovascular : Normal first and second heart sounds.  No heart murmurs  GI : Soft, nontender.  There is no organomegaly.  Bowel sounds are normal  Musculoskeletal : Normal range of motion  Extremity: There is no cyanosis, clubbing or edema.  Distal pulses are 2+ bilateral dorsalis pedis  Skin: Warm to touch.  No rashes, no ulcers  Psychiatry: Patient is cooperative, appropriate.  No delusions or hallucinations  Neurological: Awake alert and oriented x 3.  Motor strength is equal bilateral upper and lower extremities.  Sensations are intact bilaterally. CN 2-12 grossly intact     LABS / IMAGING / TELE      Labs  Lab Results   Component Value Date    WBC 6.61 01/02/2021    HGB 12.0 (L) 01/02/2021    HCT 35.6 (L) 01/02/2021    MCV 91.0 01/02/2021     01/02/2021     Lab Results   Component Value Date    GLUCOSE 112 (H) 01/02/2021    CALCIUM 8.7 (L) 01/02/2021     01/02/2021    K 4.9 01/02/2021    CO2 25 01/02/2021     01/02/2021    BUN 21 (H) 01/02/2021    CREATININE 0.5 (L) 01/02/2021                  Results from last 7 days   Lab Units 01/02/21  0519 01/01/21  0653 12/31/20  0521   ALBUMIN g/dL 2.7* 2.5* 2.7*                 Results from last 7 days   Lab Units 01/02/21  0519 01/01/21  0653 12/31/20  0521   SODIUM mEQ/L 137 136 136   POTASSIUM mEQ/L 4.9 4.6 5.1   CHLORIDE mEQ/L 103 104 105   CO2 mEQ/L 25 22 22   BUN mg/dL 21* 19 19   CREATININE mg/dL 0.5* 0.6* 0.6*   CALCIUM mg/dL 8.7* 8.5* 8.4*   ALBUMIN g/dL 2.7* 2.5* 2.7*   BILIRUBIN TOTAL mg/dL 1.0 0.9 1.1   ALK PHOS IU/L 47 43 44   ALT IU/L 100* 97* 99*   AST IU/L 42* 44* 49*   GLUCOSE mg/dL 112* 118* 163*                 Results from last 7 days   Lab Units 01/02/21  0519 01/01/21  0653 12/31/20  0521   ALK PHOS IU/L 47 43 44   BILIRUBIN TOTAL mg/dL 1.0 0.9 1.1   ALBUMIN g/dL 2.7* 2.5* 2.7*   ALT IU/L 100* 97* 99*   AST IU/L 42* 44* 49*     No results found for: TROPONINT          Results from last 7 days   Lab Units 01/01/21  0653 12/30/20  1835 12/30/20  0512   FERRITIN ng/mL 1,871* 1,964* 1,759*            ECG/Telemetry  I have independently reviewed the telemetry. Significant findings include Sinus bradycardia rate 56/min.  LINES, CATHETERS, DRAINS, AIRWAYS, AND WOUNDS   Lines, Drains, Airways, Wounds:  Peripheral IV 01/01/21 Anterior;Left;Proximal Forearm (Active)   Number of days: 1       Comments:          ASSESSMENT AND PLAN      * Pneumonia due to COVID-19 virus  Assessment & Plan  Covid pneumonia with acute hypoxic respiratory failure  Chest x-ray with progressive patchy airspace opacities noted bilaterally  Not a candidate for remdesivir as symptoms started 13 days prior to admission  With high oxygen requirements rule out secondary bacterial pneumonia  Cultures no growth so far  Empiric antibiotic started per infectious disease  Taper down high-dose Decadron with improving oxygen requirements  Heated high flow decreased from 92% FiO2 to 45% FiO2 in the last 24 hours    Acute respiratory failure with hypoxia (CMS/HCC)  Assessment & Plan  Due  to COVID-19  Prone protocol  Chest x-ray with progressive patchy airspace opacities  Pulmonary, infectious disease following    Pancytopenia (CMS/HCC)  Assessment & Plan  Likely related to acute infection  Monitor CBC w/daily labs  Consult heme/onc, signed off now that numbers are improving  Improving daily    Acute hyponatremia  Assessment & Plan  Hypovolemic hyponatremia resolved  Monitor sodium levels  Encourage p.o. intake    Coronary artery disease involving native coronary artery of native heart without angina pectoris  Assessment & Plan  Chronic, asymptomatic  Continue Lipitor, ASA       VTE Assessment: Padua VTE Score: 1  VTE Prophylaxis Plan: Lovenox  Code Status: Full Code  Estimated Discharge Date: 1/6/2021  Disposition Planning: Proving oxygen requirements.  Return to taper Decadron.  Empiric antibiotic.     Reagan Haque MD  1/2/2021

## 2021-01-02 NOTE — PROGRESS NOTES
"Infectious Disease Progress Note    Patient Name: Nadeem Morales  MR#: [id number]  : 1955  Admission Date: 2020  Date: 21   Time: 7:12 PM   Author: Jean Hammond MD    Major Events: Pt alert remains dyspneic no fever chest pain no abdominal discomfort or increased stool formation    Antibiotics:    Anti-infectives (From admission, onward)    Start     Dose/Rate Route Frequency Ordered Stop    20 1845  cefTRIAXone (ROCEPHIN) IVPB 1 g in 100 mL NSS vial in bag      1 g  200 mL/hr over 30 Minutes intravenous Every 24 hours interval 20 3741            Subjective     Review of Systems    Pertinent items are noted in HPI.    Objective     Vital Signs:    Patient Vitals for the past 72 hrs:   BP Temp Temp src Pulse Resp SpO2 Height Weight   21 1528 108/63 36.4 °C (97.6 °F) Oral 70 20 92 % -- --   21 1129 107/63 36.4 °C (97.6 °F) Oral 79 20 (!) 91 % -- --   21 1107 -- -- -- -- 20 (!) 91 % -- --   21 0800 -- -- -- -- -- 92 % -- --   21 0754 99/66 36.6 °C (97.8 °F) Oral (!) 50 20 93 % -- --   21 0400 118/69 36.5 °C (97.7 °F) Oral (!) 55 20 (!) 91 % -- --   21 0000 118/68 36.4 °C (97.6 °F) Oral 83 20 96 % -- --   20 2100 -- -- -- -- -- 97 % -- --   20 115/64 36.5 °C (97.7 °F) Oral 63 20 97 % -- --   20 1654 -- -- -- 61 -- -- -- --   20 1610 -- -- -- -- 20 93 % -- --   20 1546 (!) 113/58 36.6 °C (97.9 °F) Oral 62 20 95 % -- --   20 1420 107/69 36.2 °C (97.1 °F) Oral (!) 58 20 93 % -- --   20 1253 -- -- -- -- -- 93 % -- --   20 1240 (!) 110/57 36.7 °C (98.1 °F) Oral (!) 55 20 93 % -- --   20 1152 -- -- -- -- 20 93 % -- --   20 1109 -- 36.5 °C (97.7 °F) Oral (!) 52 20 (!) 90 % -- --   20 1000 -- -- -- -- -- -- 1.753 m (5' 9.02\") 76.7 kg (169 lb 1.5 oz)   20 0816 (!) 101/53 36.7 °C (98.1 °F) Oral (!) 53 20 92 % -- --   20 0811 -- -- -- -- 20 92 % -- --   20 0400 " 114/67 36.4 °C (97.6 °F) Oral (!) 47 20 100 % -- --   20 0348 -- -- -- -- -- 94 % -- --   20 0000 114/69 36.9 °C (98.4 °F) Oral 62 20 95 % -- --   20 234 -- -- -- -- -- 93 % -- --   20 -- -- -- -- -- (!) 91 % -- --   20 (!) 117/53 36.9 °C (98.5 °F) Oral 63 20 (!) 90 % -- --   20 1700 -- -- -- -- 20 (!) 91 % -- --   20 1600 107/60 36.9 °C (98.5 °F) Oral (!) 55 18 (!) 91 % -- --   20 1200 104/60 36.9 °C (98.4 °F) Oral 77 19 92 % -- --   20 1130 -- -- -- -- 20 (!) 91 % -- --   20 0800 (!) 99/58 36.8 °C (98.3 °F) Oral (!) 58 (!) 22 (!) 90 % -- --   20 0400 111/64 36.9 °C (98.4 °F) Oral (!) 44 18 92 % -- --   20 0359 -- -- -- -- -- 93 % -- --   20 0021 (!) 103/59 37 °C (98.6 °F) Oral 60 20 92 % -- --   20 0000 -- -- -- (!) 47 -- -- -- --   20 -- -- -- -- -- 92 % -- --   20 -- -- -- -- -- (!) 91 % -- --   20 -- -- -- (!) 52 -- -- -- --   20 111/63 36.6 °C (97.9 °F) Oral 61 20 (!) 90 % -- --       Temp (72hrs), Av.7 °C (98 °F), Min:36.2 °C (97.1 °F), Max:37 °C (98.6 °F)      Physical Exam:    Anicteric  No stridor or meningismus  Lung moderate respiratory distress  Iv access intact  abd no distension  slr intact  Neuro cn intact ms 5/5 upper and lower    Lines, Drains, Airways, Wounds:       Labs:    CBC Results       21                    0653 0521 0512         WBC 7.30 3.33 6.26         RBC 3.80 3.81 3.62         HGB 11.5 11.4 10.9         HCT 33.3 33.8 31.9         MCV 87.6 88.7 88.1         MCH 30.3 29.9 30.1         MCHC 34.5 33.7 34.2          209 291         Comment for WBC at 0653 on 21: ALL RESULTS HAVE BEEN CHECKED    Comment for PLT at 0653 on 21: RESULTS CHECKED. SPECIMEN QUALITY CHECKED    Comment for PLT at 0512 on 20: RESULTS CHECKED. CONFIRMED WITH SMEAR ESTIMATE      BMP Results       21                     0653 0521 0512          136 134         K 4.6 5.1 4.5         Cl 104 105 104         CO2 22 22 22         Glucose 118 163 98         BUN 19 19 20         Creatinine 0.6 0.6 0.6         Calcium 8.5 8.4 8.1         Anion Gap 10 9 8         EGFR >60.0 >60.0 >60.0                   PT/PTT Results       12/25/20 12/22/20                       0001 1124          PT 14.0 14.3          INR 1.1 1.1          PTT 37 32          Comment for INR at 0001 on 12/25/20: INR has no defined significance when PT is within Reference Range.    Comment for INR at 1124 on 12/22/20: INR has no defined significance when PT is within Reference Range.    Comment for PTT at 0001 on 12/25/20: The Standard Therapeutic Range for Heparin is 68 to 101 seconds.      UA Results       12/24/20                          2300           Color Yellow           Clarity Clear           Glucose Negative           Bilirubin Negative           Ketones +1           Sp Grav 1.023           Blood Negative           Ph 6.5           Protein +2           Urobilinogen 1.0           Nitrite Negative           Leuk Est Negative           WBC 0 TO 3           RBC 0 TO 4           Bacteria None Seen           Comment for Ketones at 2300 on 12/24/20: Free sulfhydryl drugs such as Mesna, Capoten, and Acetylcysteine (Mucomyst) may cause false positive ketonuria.    Comment for Blood at 2300 on 12/24/20: The sensitivity of the occult blood test is equivalent to approximately 4 intact RBC/HPF.    Comment for Leuk Est at 2300 on 12/24/20: Results can be falsely negative due to high specific gravity, some antibiotics, glucose >3 g/dl, or WBC other than neutrophils.      Lactate Results       12/24/20 2042           Lactate 1.2                           Microbiology Results     Procedure Component Value Units Date/Time    Blood Culture Blood, Venous [[id number]] Collected: 12/30/20 1835    Specimen: Blood, Venous Updated: 12/31/20 2300      Culture No growth at 18-24 hours    Blood Culture Blood, Venous [[id number]] Collected: 12/24/20 2042    Specimen: Blood, Venous Updated: 12/29/20 2301     Culture No growth at 120 hours    Blood Culture Blood, Venous [[id number]] Collected: 12/24/20 2042    Specimen: Blood, Venous Updated: 12/29/20 2301     Culture No growth at 120 hours          Pathology Results     ** No results found for the last 720 hours. **          Echo:          Imaging:    Radiology Imaging   XR CHEST 1 VW    Narrative CLINICAL HISTORY: Respiratory failure, noncardiac cause    COMPARISON: 12/27/2020.    COMMENT: 1 view of the chest was performed    There is interval progression of left mid to upper lung zone airspace opacity  and stable right lung base patchy airspace opacity.  There is no pneumothorax or pleural effusion.  The heart size and mediastinal contours are unchanged.  No acute osseous abnormality.      Impression IMPRESSION:    Interval progression in left mid to upper lobe airspace opacity when compared to  prior study.  Stable right mid to lower lung zone patchy airspace opacity.       Assessment      Covid-19  Pneumonia  Hypoxic respiratory distress  dexamethasone     Pancytopenia   Resolved     Secondary bacterial pneumonia as reasonable consideration  C/s analysis pending        Plan     abx mgmt  Recommend ceftriaxone pending c/s analysis    Discussed clinical presentation with pt

## 2021-01-03 LAB
ALBUMIN SERPL-MCNC: 2.5 G/DL (ref 3.4–5)
ALP SERPL-CCNC: 48 IU/L (ref 35–126)
ALT SERPL-CCNC: 84 IU/L (ref 16–63)
ANION GAP SERPL CALC-SCNC: 11 MEQ/L (ref 3–15)
AST SERPL-CCNC: 31 IU/L (ref 15–41)
BASOPHILS # BLD: 0 K/UL (ref 0.01–0.1)
BASOPHILS NFR BLD: 0 %
BILIRUB SERPL-MCNC: 1 MG/DL (ref 0.3–1.2)
BUN SERPL-MCNC: 22 MG/DL (ref 8–20)
CALCIUM SERPL-MCNC: 8.4 MG/DL (ref 8.9–10.3)
CHLORIDE SERPL-SCNC: 102 MEQ/L (ref 98–109)
CK SERPL-CCNC: 21 U/L (ref 16–300)
CO2 SERPL-SCNC: 22 MEQ/L (ref 22–32)
CREAT SERPL-MCNC: 0.6 MG/DL (ref 0.8–1.3)
CRP SERPL-MCNC: 14.3 MG/L
DIFFERENTIAL METHOD BLD: ABNORMAL
EOSINOPHIL # BLD: 0 K/UL (ref 0.04–0.54)
EOSINOPHIL NFR BLD: 0 %
ERYTHROCYTE [DISTWIDTH] IN BLOOD BY AUTOMATED COUNT: 13.1 % (ref 11.6–14.4)
FERRITIN SERPL-MCNC: 1958 NG/ML (ref 24–250)
GFR SERPL CREATININE-BSD FRML MDRD: >60 ML/MIN/1.73M*2
GLUCOSE SERPL-MCNC: 116 MG/DL (ref 70–99)
HCT VFR BLDCO AUTO: 35.1 % (ref 40.1–51)
HGB BLD-MCNC: 11.9 G/DL (ref 13.7–17.5)
IMM GRANULOCYTES # BLD AUTO: 0.03 K/UL (ref 0–0.08)
IMM GRANULOCYTES NFR BLD AUTO: 0.5 %
LDH SERPL L TO P-CCNC: 376 IU/L (ref 98–192)
LYMPHOCYTES # BLD: 0.5 K/UL (ref 1.2–3.5)
LYMPHOCYTES NFR BLD: 8.6 %
MCH RBC QN AUTO: 30.1 PG (ref 28–33.2)
MCHC RBC AUTO-ENTMCNC: 33.9 G/DL (ref 32.2–36.5)
MCV RBC AUTO: 88.6 FL (ref 83–98)
MONOCYTES # BLD: 0.19 K/UL (ref 0.3–1)
MONOCYTES NFR BLD: 3.3 %
NEUTROPHILS # BLD: 5.09 K/UL (ref 1.7–7)
NEUTS SEG NFR BLD: 87.6 %
NRBC BLD-RTO: 0 %
PDW BLD AUTO: 11.3 FL (ref 9.4–12.4)
PLATELET # BLD AUTO: 246 K/UL (ref 150–350)
POTASSIUM SERPL-SCNC: 4.7 MEQ/L (ref 3.6–5.1)
PROT SERPL-MCNC: 6.1 G/DL (ref 6–8.2)
RBC # BLD AUTO: 3.96 M/UL (ref 4.5–5.8)
SODIUM SERPL-SCNC: 135 MEQ/L (ref 136–144)
WBC # BLD AUTO: 5.81 K/UL (ref 3.8–10.5)

## 2021-01-03 PROCEDURE — 85025 COMPLETE CBC W/AUTO DIFF WBC: CPT | Performed by: HOSPITALIST

## 2021-01-03 PROCEDURE — 63600000 HC DRUGS/DETAIL CODE: Performed by: INTERNAL MEDICINE

## 2021-01-03 PROCEDURE — 86140 C-REACTIVE PROTEIN: CPT | Performed by: HOSPITALIST

## 2021-01-03 PROCEDURE — 99233 SBSQ HOSP IP/OBS HIGH 50: CPT | Mod: CR | Performed by: INTERNAL MEDICINE

## 2021-01-03 PROCEDURE — 80053 COMPREHEN METABOLIC PANEL: CPT | Performed by: HOSPITALIST

## 2021-01-03 PROCEDURE — 82550 ASSAY OF CK (CPK): CPT | Performed by: HOSPITALIST

## 2021-01-03 PROCEDURE — 27900059 OXYGEN DAILY

## 2021-01-03 PROCEDURE — 83615 LACTATE (LD) (LDH) ENZYME: CPT | Performed by: HOSPITALIST

## 2021-01-03 PROCEDURE — 82728 ASSAY OF FERRITIN: CPT | Performed by: HOSPITALIST

## 2021-01-03 PROCEDURE — 63700000 HC SELF-ADMINISTRABLE DRUG: Performed by: HOSPITALIST

## 2021-01-03 PROCEDURE — 20600000 HC ROOM AND CARE INTERMEDIATE/TELEMETRY

## 2021-01-03 PROCEDURE — 25800000 HC PHARMACY IV SOLUTIONS: Performed by: INTERNAL MEDICINE

## 2021-01-03 RX ADMIN — THERA TABS 1 TABLET: TAB at 08:38

## 2021-01-03 RX ADMIN — ATORVASTATIN CALCIUM 10 MG: 10 TABLET, FILM COATED ORAL at 17:05

## 2021-01-03 RX ADMIN — DEXAMETHASONE SODIUM PHOSPHATE 10 MG: 4 INJECTION, SOLUTION INTRA-ARTICULAR; INTRALESIONAL; INTRAMUSCULAR; INTRAVENOUS; SOFT TISSUE at 08:38

## 2021-01-03 RX ADMIN — ENOXAPARIN SODIUM 40 MG: 40 INJECTION SUBCUTANEOUS at 17:06

## 2021-01-03 RX ADMIN — ASPIRIN 81 MG: 81 TABLET, COATED ORAL at 08:38

## 2021-01-03 RX ADMIN — CEFTRIAXONE SODIUM 1 G: 1 INJECTION, POWDER, FOR SOLUTION INTRAMUSCULAR; INTRAVENOUS at 17:05

## 2021-01-03 RX ADMIN — ENOXAPARIN SODIUM 40 MG: 40 INJECTION SUBCUTANEOUS at 06:09

## 2021-01-03 NOTE — PROGRESS NOTES
Hospital Medicine Service -  Daily Progress Note       SUBJECTIVE   Interval History: Patient in the shortness of breath.  Worse with any movement.  Denies any pain     OBJECTIVE      Vital signs in last 24 hours:  Temp:  [36.3 °C (97.4 °F)-36.7 °C (98.1 °F)] 36.5 °C (97.7 °F)  Heart Rate:  [65-73] 70  Resp:  [18] 18  BP: ()/(50-66) 114/66  FiO2 (%) (Set):  [35 %] 35 %  No intake or output data in the 24 hours ending 01/03/21 1330    PHYSICAL EXAMINATION      Constitutional : appears in mild acute distress  Eyes : Extraocular movements are intact, pupils are equal in size and reactive to light bilaterally.    ENMT: JVD is not enlarged.  No lesions on oral mucosa.  Head is atraumatic  Respiratory : Bilateral rhonchi and decreased air entry at lung bases to auscultation bilaterally  Cardiovascular : Normal first and second heart sounds.  No heart murmurs  GI : Soft, nontender.  There is no organomegaly.  Bowel sounds are normal  Musculoskeletal : Normal range of motion  Extremity: There is no cyanosis, clubbing or edema.  Distal pulses are 2+ bilateral dorsalis pedis  Skin: Warm to touch.  No rashes, no ulcers  Psychiatry: Patient is cooperative, appropriate.  No delusions or hallucinations  Neurological: Awake alert and oriented x 3.  Motor strength is equal bilateral upper and lower extremities.  Sensations are intact bilaterally. CN 2-12 grossly intact     LABS / IMAGING / TELE      Labs  Lab Results   Component Value Date    WBC 5.81 01/03/2021    HGB 11.9 (L) 01/03/2021    HCT 35.1 (L) 01/03/2021    MCV 88.6 01/03/2021     01/03/2021     Lab Results   Component Value Date    GLUCOSE 116 (H) 01/03/2021    CALCIUM 8.4 (L) 01/03/2021     (L) 01/03/2021    K 4.7 01/03/2021    CO2 22 01/03/2021     01/03/2021    BUN 22 (H) 01/03/2021    CREATININE 0.6 (L) 01/03/2021                 Results from last 7 days   Lab Units 01/03/21  0454 01/02/21  0519 01/01/21  0653   ALBUMIN g/dL 2.5* 2.7*  2.5*                 Results from last 7 days   Lab Units 01/03/21  0454 01/02/21  0519 01/01/21  0653   SODIUM mEQ/L 135* 137 136   POTASSIUM mEQ/L 4.7 4.9 4.6   CHLORIDE mEQ/L 102 103 104   CO2 mEQ/L 22 25 22   BUN mg/dL 22* 21* 19   CREATININE mg/dL 0.6* 0.5* 0.6*   CALCIUM mg/dL 8.4* 8.7* 8.5*   ALBUMIN g/dL 2.5* 2.7* 2.5*   BILIRUBIN TOTAL mg/dL 1.0 1.0 0.9   ALK PHOS IU/L 48 47 43   ALT IU/L 84* 100* 97*   AST IU/L 31 42* 44*   GLUCOSE mg/dL 116* 112* 118*                 Results from last 7 days   Lab Units 01/03/21  0454 01/02/21  0519 01/01/21  0653   ALK PHOS IU/L 48 47 43   BILIRUBIN TOTAL mg/dL 1.0 1.0 0.9   ALBUMIN g/dL 2.5* 2.7* 2.5*   ALT IU/L 84* 100* 97*   AST IU/L 31 42* 44*     No results found for: TROPONINT          Results from last 7 days   Lab Units 01/03/21  0454 01/01/21  0653 12/30/20  1835   FERRITIN ng/mL 1,958* 1,871* 1,964*            ECG/Telemetry  I have independently reviewed the telemetry. Significant findings include Normal sinus rhythm rate 60/min.  LINES, CATHETERS, DRAINS, AIRWAYS, AND WOUNDS   Lines, Drains, Airways, Wounds:  Peripheral IV 01/01/21 Anterior;Left;Proximal Forearm (Active)   Number of days: 2       Comments:          ASSESSMENT AND PLAN      * Pneumonia due to COVID-19 virus  Assessment & Plan  Covid pneumonia with acute hypoxic respiratory failure  Chest x-ray with progressive patchy airspace opacities noted bilaterally  Not a candidate for remdesivir as symptoms started 13 days prior to admission  Cultures no growth to 72 hours  Was started on empiric Rocephin for secondary bacterial pneumonia given worsening oxygen requirements  Taper down high-dose Decadron with improving oxygen requirements  Oxygen requirements improved.  Now on salter 11 L/min from heated high flow      Acute respiratory failure with hypoxia (CMS/HCC)  Assessment & Plan  Due to COVID-19  Prone protocol  Chest x-ray with progressive patchy airspace opacities  Pulmonary, infectious disease  following    Pancytopenia (CMS/HCC)  Assessment & Plan  Likely related to acute infection  Monitor CBC w/daily labs  Consult heme/onc, signed off now that numbers are improving  Improving daily    Acute hyponatremia  Assessment & Plan  Hypovolemic hyponatremia resolved  Monitor sodium levels  Encourage p.o. intake    Coronary artery disease involving native coronary artery of native heart without angina pectoris  Assessment & Plan  Chronic, asymptomatic  Continue Lipitor, ASA       VTE Assessment: Padua VTE Score: 1  VTE Prophylaxis Plan: Lovenox  Code Status: Full Code  Estimated Discharge Date: 1/6/2021  Disposition Planning: Monitor oxygen requirements.  Continue Decadron.  Empiric antibiotics.     Reagan Haque MD  1/3/2021

## 2021-01-04 PROBLEM — E87.1 ACUTE HYPONATREMIA: Status: RESOLVED | Noted: 2020-12-24 | Resolved: 2021-01-04

## 2021-01-04 LAB
ALBUMIN SERPL-MCNC: 2.4 G/DL (ref 3.4–5)
ALP SERPL-CCNC: 46 IU/L (ref 35–126)
ALT SERPL-CCNC: 76 IU/L (ref 16–63)
ANION GAP SERPL CALC-SCNC: 7 MEQ/L (ref 3–15)
AST SERPL-CCNC: 31 IU/L (ref 15–41)
BACTERIA BLD CULT: NORMAL
BASOPHILS # BLD: 0 K/UL (ref 0.01–0.1)
BASOPHILS NFR BLD: 0 %
BILIRUB SERPL-MCNC: 0.8 MG/DL (ref 0.3–1.2)
BUN SERPL-MCNC: 22 MG/DL (ref 8–20)
CALCIUM SERPL-MCNC: 8.3 MG/DL (ref 8.9–10.3)
CHLORIDE SERPL-SCNC: 103 MEQ/L (ref 98–109)
CO2 SERPL-SCNC: 26 MEQ/L (ref 22–32)
CREAT SERPL-MCNC: 0.6 MG/DL (ref 0.8–1.3)
DIFFERENTIAL METHOD BLD: ABNORMAL
EOSINOPHIL # BLD: 0 K/UL (ref 0.04–0.54)
EOSINOPHIL NFR BLD: 0 %
ERYTHROCYTE [DISTWIDTH] IN BLOOD BY AUTOMATED COUNT: 13 % (ref 11.6–14.4)
GFR SERPL CREATININE-BSD FRML MDRD: >60 ML/MIN/1.73M*2
GLUCOSE SERPL-MCNC: 98 MG/DL (ref 70–99)
HCT VFR BLDCO AUTO: 36 % (ref 40.1–51)
HGB BLD-MCNC: 12.3 G/DL (ref 13.7–17.5)
IMM GRANULOCYTES # BLD AUTO: 0.04 K/UL (ref 0–0.08)
IMM GRANULOCYTES NFR BLD AUTO: 0.6 %
LYMPHOCYTES # BLD: 0.62 K/UL (ref 1.2–3.5)
LYMPHOCYTES NFR BLD: 10 %
MCH RBC QN AUTO: 30.8 PG (ref 28–33.2)
MCHC RBC AUTO-ENTMCNC: 34.2 G/DL (ref 32.2–36.5)
MCV RBC AUTO: 90 FL (ref 83–98)
MONOCYTES # BLD: 0.17 K/UL (ref 0.3–1)
MONOCYTES NFR BLD: 2.8 %
NEUTROPHILS # BLD: 5.34 K/UL (ref 1.7–7)
NEUTS SEG NFR BLD: 86.6 %
NRBC BLD-RTO: 0 %
PDW BLD AUTO: 11.2 FL (ref 9.4–12.4)
PLATELET # BLD AUTO: 142 K/UL (ref 150–350)
POTASSIUM SERPL-SCNC: 4.7 MEQ/L (ref 3.6–5.1)
PROT SERPL-MCNC: 6.1 G/DL (ref 6–8.2)
RBC # BLD AUTO: 4 M/UL (ref 4.5–5.8)
SODIUM SERPL-SCNC: 136 MEQ/L (ref 136–144)
WBC # BLD AUTO: 6.17 K/UL (ref 3.8–10.5)

## 2021-01-04 PROCEDURE — 20600000 HC ROOM AND CARE INTERMEDIATE/TELEMETRY

## 2021-01-04 PROCEDURE — 63700000 HC SELF-ADMINISTRABLE DRUG: Performed by: HOSPITALIST

## 2021-01-04 PROCEDURE — 80053 COMPREHEN METABOLIC PANEL: CPT | Performed by: HOSPITALIST

## 2021-01-04 PROCEDURE — 63600000 HC DRUGS/DETAIL CODE: Mod: JW | Performed by: INTERNAL MEDICINE

## 2021-01-04 PROCEDURE — 63700000 HC SELF-ADMINISTRABLE DRUG: Performed by: NURSE PRACTITIONER

## 2021-01-04 PROCEDURE — 63600000 HC DRUGS/DETAIL CODE: Performed by: INTERNAL MEDICINE

## 2021-01-04 PROCEDURE — 85025 COMPLETE CBC W/AUTO DIFF WBC: CPT | Performed by: HOSPITALIST

## 2021-01-04 PROCEDURE — 99233 SBSQ HOSP IP/OBS HIGH 50: CPT | Mod: CR | Performed by: INTERNAL MEDICINE

## 2021-01-04 RX ORDER — CEFUROXIME AXETIL 250 MG/1
500 TABLET ORAL EVERY 12 HOURS
Status: COMPLETED | OUTPATIENT
Start: 2021-01-04 | End: 2021-01-06

## 2021-01-04 RX ADMIN — ENOXAPARIN SODIUM 40 MG: 40 INJECTION SUBCUTANEOUS at 05:15

## 2021-01-04 RX ADMIN — CEFUROXIME AXETIL 500 MG: 250 TABLET, FILM COATED ORAL at 17:09

## 2021-01-04 RX ADMIN — THERA TABS 1 TABLET: TAB at 08:24

## 2021-01-04 RX ADMIN — ATORVASTATIN CALCIUM 10 MG: 10 TABLET, FILM COATED ORAL at 17:09

## 2021-01-04 RX ADMIN — ENOXAPARIN SODIUM 40 MG: 40 INJECTION SUBCUTANEOUS at 17:09

## 2021-01-04 RX ADMIN — ASPIRIN 81 MG: 81 TABLET, COATED ORAL at 08:24

## 2021-01-04 RX ADMIN — DEXAMETHASONE SODIUM PHOSPHATE 10 MG: 4 INJECTION, SOLUTION INTRA-ARTICULAR; INTRALESIONAL; INTRAMUSCULAR; INTRAVENOUS; SOFT TISSUE at 08:25

## 2021-01-04 NOTE — PATIENT CARE CONFERENCE
Care Progression Rounds Note  Date: 1/4/2021  Time: 11:48 AM     Patient Name: Nadeem Morales     Medical Record Number: [id number]   YOB: 1955  Sex: Male      Room/Bed: 4507W    Admitting Diagnosis: Shortness of breath [R06.02]  Hypoxia [R09.02]  COVID-19 virus infection [U07.1]  Pneumonia due to COVID-19 virus [U07.1, J12.82]   Admit Date/Time: 12/24/2020  8:28 PM    Primary Diagnosis: Pneumonia due to COVID-19 virus  Principal Problem: Pneumonia due to COVID-19 virus    GMLOS: 5.4  Anticipated Discharge Date: 1/8/2021    AM-PAC  Mobility Score:      Discharge Planning:  Living Arrangements: house  Anticipated Discharge Disposition: home without services    Barriers to Discharge:  Barriers to Discharge: Medical issues not resolved  Comment: Still on IV Decadron, 12 L Salter O2    Participants:  , nursing, social work/services

## 2021-01-04 NOTE — PROGRESS NOTES
Pulmonary Progress Note      Subjective:  Resting in bed. Comfortable on 10L Salter cannula.  Offers no acute complaints. Feeling better overall.  O2 requirements being weaned. Coughing less.  Had desaturations overnight.   Required slight increase in FiO2 per RT.  Denies any fevers, chills, chest congestion.  Chart/labs/vitals reviewed.    Allergies: Patient has no known allergies.    Medications:  Current Facility-Administered Medications   Medication Dose Route Frequency Provider Last Rate Last Admin   • acetaminophen (TYLENOL) tablet 650 mg  650 mg oral q4h PRN Debbie Whitfield MD   650 mg at 12/25/20 2130    Or   • acetaminophen (TYLENOL) suppository 650 mg  650 mg rectal q4h PRN Debbie Whitfield MD        Or   • acetaminophen (TYLENOL) 650 mg/20.3 mL solution 650 mg  650 mg oral q4h PRN Debbie Whitfield MD       • albuterol HFA (VENTOLIN HFA) 90 mcg/actuation inhaler 2 puff  2 puff inhalation q6h PRN Debbie Whitfield MD       • aspirin enteric coated tablet 81 mg  81 mg oral Daily Debbie Whitfield MD   81 mg at 01/04/21 0824   • atorvastatin (LIPITOR) tablet 10 mg  10 mg oral Daily (6p) Debbie Whitfield MD   10 mg at 01/03/21 1705   • cefUROXime (CEFTIN) tablet 500 mg  500 mg oral q12h Sanna Gutierrez CRNP       • dexamethasone (DECADRON) injection 10 mg  10 mg intravenous Daily Reagan Haque MD   10 mg at 01/04/21 0825   • glucose chewable tablet 16-32 g of dextrose  16-32 g of dextrose oral PRN Debbie Whitfield MD        Or   • dextrose 40 % oral gel 15-30 g of dextrose  15-30 g of dextrose oral PRN Debbie Whitfield MD        Or   • glucagon (GLUCAGEN) injection 1 mg  1 mg intramuscular PRN Debbie Whitfield MD        Or   • dextrose in water injection 12.5 g  25 mL intravenous PRN Debbie Whitfield MD       • enoxaparin (LOVENOX) syringe 40 mg  0.5 mg/kg subcutaneous q12h (6a, 6p) Salinas Villalta DO   40 mg at 01/04/21 0515   • melatonin capsule 5 mg  5 mg oral Nightly PRN Debbie Whitfield MD   5 mg at  12/25/20 2319   • morphine injection 1 mg  1 mg intravenous q3h PRN Reagan Haque MD       • multivitamin tablet 1 tablet  1 tablet oral Daily Debbie Whitfield MD   1 tablet at 01/04/21 0824   • ondansetron (ZOFRAN) injection 4 mg  4 mg intravenous q8h PRN Debbie Whitfield MD         Review of Systems:  Unchanged except as noted in HPI    Vital signs in last 24 hours:  Temp:  [36.3 °C (97.4 °F)-36.6 °C (97.9 °F)] 36.3 °C (97.4 °F)  Heart Rate:  [51-83] 54  Resp:  [16-20] 20  BP: (106-130)/(60-75) 130/73    Input/Ouput in Last 24 hours:    Intake/Output Summary (Last 24 hours) at 1/4/2021 1419  Last data filed at 1/3/2021 1635  Gross per 24 hour   Intake --   Output 1000 ml   Net -1000 ml     Physical Exam  General: Elderly male, NAD, comfortable on 10L Salter cannula  HEENT: Moist membranes, PERRL   Neck: Supple, no JVD, no tug or stridor, trach midline  Cardiac: Bradycardic, regular, +S1/S2, no murmur appreciated  Lungs: Normal effort, not in any acute distress  Diminished breath sounds, greatest at bases  Coarse inspiratory crackles bilaterally  No wheeze/rhonchi  Abdomen: Soft, NT/ND, +BS, no rebound/guarding   : Deferred  Extremities: No edema, clubbing or cyanosis  Musculoskeletal: No joint deformity  Vascular: No ischemia noted  Neuro: AAOx3, nonfocal  Skin: Warm, limited exam, defer to attending/nursing  Psych: Cooperative, appropriate    Labs:    Lab Results   Component Value Date    WBC 6.17 01/04/2021    HGB 12.3 (L) 01/04/2021    HCT 36.0 (L) 01/04/2021     (L) 01/04/2021    ALT 76 (H) 01/04/2021    AST 31 01/04/2021     01/04/2021    K 4.7 01/04/2021     01/04/2021    CREATININE 0.6 (L) 01/04/2021    BUN 22 (H) 01/04/2021    CO2 26 01/04/2021    INR 1.1 12/25/2020     Imaging:  No new chest imaging to review.    ECG/Telemetry: Sinus bradycardia @ 53    IMPRESSION AND PLAN    Acute hypoxic respiratory failure  Not O2 dependent at baseline. Oxygen requirements since secondary to  COVID infection   - Currently requiring 10 liter Salter cannula  - Continue supplemental oxygen.   - Titrate FiO2 to maintain SpO2 90-96%  -- SpO2 high 90s on evaluation. Aggressively wean O2 requirements as able.  - Repeat ABG and CXR prn.  - Encourage modified proning and incentive spirometry.     Coronavirus (COVID-19) infection  PCR positive on 12/14.  - Maintain contact and droplet precautions  - Monitor inflammatory markers intermittently  - Completed 20mg IV Decadron qDay 1/3.  -- Decreased to 10mg qDay 1/3. Day #2/5 of therapy.  - ID following. Supportive care  - DVT ppx w/ LMWH 0.5mg/kg BID   - Albuterol HFA PRN   - Repeat chest x-ray PRN.  - Outpatient follow up imaging in 6-8 weeks to ensure resolution of infiltrates.     Former Smoker  30 pack year history. Quit 26 years ago    -- Additional history per EMR/attending.    Diet: Regular consistency, 2gm potassium  DVT prophylaxis: sq Lovenox 40mg BID  Code status: Full code  Case d/w: patient, DEJAH Galvez, RT Mat

## 2021-01-04 NOTE — PROGRESS NOTES
Infectious Disease Progress Note    Patient Name: Nadeem Morales  MR#: [id number]  : 1955  Admission Date: 2020  Date: 21   Time: 12:01 PM   Author: QI Srinivasan    Major Events:     Antibiotics:    Anti-infectives (From admission, onward)    Start     Dose/Rate Route Frequency Ordered Stop    20 1845  cefTRIAXone (ROCEPHIN) IVPB 1 g in 100 mL NSS vial in bag      1 g  200 mL/hr over 30 Minutes intravenous Every 24 hours interval 20 1195            Subjective   Patient states he is starting to feel less short of breath and thinks the oxygen can be turned down some more, he denies any pain, states he really has not been OOB much yet but has a goal to get OOB more this week    Review of Systems    Pertinent items are noted in HPI.    Objective     Vital Signs:    Patient Vitals for the past 72 hrs:   BP Temp Temp src Pulse Resp SpO2   21 1134 130/73 36.3 °C (97.4 °F) Oral (!) 54 20 95 %   21 0835 106/60 36.4 °C (97.5 °F) Oral 61 20 92 %   21 0800 -- -- -- 60 -- --   21 0739 -- -- -- -- 20 (!) 90 %   21 0400 115/67 36.5 °C (97.7 °F) Oral 83 16 95 %   21 0026 115/66 36.6 °C (97.9 °F) Oral 81 16 99 %   21 2001 115/62 36.4 °C (97.6 °F) Oral 80 16 99 %   21 1700 -- -- -- -- -- 97 %   21 1600 115/75 36.5 °C (97.7 °F) Oral (!) 51 16 95 %   21 1257 -- -- -- -- -- 95 %   21 1149 114/66 36.5 °C (97.7 °F) Oral 70 18 99 %   21 1032 -- -- -- -- -- 100 %   21 0800 97/61 36.6 °C (97.8 °F) Oral 65 18 94 %   21 0754 -- -- -- -- -- 94 %   21 0444 (!) 100/57 36.3 °C (97.4 °F) Oral 73 18 94 %   21 0049 (!) 106/50 36.6 °C (97.9 °F) Oral 70 18 94 %   21 2358 -- -- -- -- -- 93 %   21 2032 117/64 36.7 °C (98 °F) Oral 71 18 95 %   21 1730 -- -- -- -- -- 94 %   21 1554 114/61 36.7 °C (98.1 °F) Oral 70 18 95 %   21 1553 -- -- -- -- -- 95 %   21 1214 -- -- -- -- 18 99  "%   21 1116 (!) 108/58 36.6 °C (97.9 °F) Oral (!) 56 18 (!) 91 %   21 0800 (!) 108/59 36.6 °C (97.9 °F) Oral 71 18 92 %   21 0757 -- -- -- -- 18 95 %   21 0514 (!) 91/48 36.6 °C (97.8 °F) Oral 68 18 92 %   21 0445 -- -- -- -- -- 92 %   21 0016 (!) 114/54 37.2 °C (98.9 °F) Oral (!) 51 18 94 %   21 2307 -- -- -- -- -- 94 %   21 122/74 36.9 °C (98.4 °F) Oral 60 18 93 %   21 1940 -- -- -- -- -- 100 %   21 1528 108/63 36.4 °C (97.6 °F) Oral 70 20 92 %       Temp (72hrs), Av.6 °C (97.8 °F), Min:36.3 °C (97.4 °F), Max:37.2 °C (98.9 °F)      Physical Exam:    Visit Vitals  /73   Pulse (!) 54   Temp 36.3 °C (97.4 °F) (Oral)   Resp 20   Ht 1.753 m (5' 9.02\") Comment: per nsg   Wt 76.7 kg (169 lb 1.5 oz) Comment: per nsg   SpO2 95%   BMI 24.96 kg/m²     General appearance: alert, appears stated age, cooperative and no distress  Eyes: anicteric  Lungs: nonlabored, no cough observed, faint crackles at the bases, no wheezes or rhonchi  Abdomen: soft, ND, NT  Neurologic: Mental status: Alert, oriented, thought content appropriate    Lines, Drains, Airways, Wounds:  Peripheral IV 21 Anterior;Left;Proximal Forearm (Active)   Number of days: 3       Labs:    CBC Results       21                    0509 0454 0519         WBC 6.17 5.81 6.61         RBC 4.00 3.96 3.91         HGB 12.3 11.9 12.0         HCT 36.0 35.1 35.6         MCV 90.0 88.6 91.0         MCH 30.8 30.1 30.7         MCHC 34.2 33.9 33.7          246 285                   BMP Results       21                    0509 0454 0519          135 137         K 4.7 4.7 4.9         Cl 103 102 103         CO2 26 22 25         Glucose 98 116 112         BUN  22 21         Creatinine 0.6 0.6 0.5         Calcium 8.3 8.4 8.7         Anion Gap 7 11 9         EGFR >60.0 >60.0 >60.0                   PT/PTT Results       20          "              0001 1124          PT 14.0 14.3          INR 1.1 1.1          PTT 37 32          Comment for INR at 0001 on 12/25/20: INR has no defined significance when PT is within Reference Range.    Comment for INR at 1124 on 12/22/20: INR has no defined significance when PT is within Reference Range.    Comment for PTT at 0001 on 12/25/20: The Standard Therapeutic Range for Heparin is 68 to 101 seconds.      UA Results       12/24/20 2300           Color Yellow           Clarity Clear           Glucose Negative           Bilirubin Negative           Ketones +1           Sp Grav 1.023           Blood Negative           Ph 6.5           Protein +2           Urobilinogen 1.0           Nitrite Negative           Leuk Est Negative           WBC 0 TO 3           RBC 0 TO 4           Bacteria None Seen           Comment for Ketones at 2300 on 12/24/20: Free sulfhydryl drugs such as Mesna, Capoten, and Acetylcysteine (Mucomyst) may cause false positive ketonuria.    Comment for Blood at 2300 on 12/24/20: The sensitivity of the occult blood test is equivalent to approximately 4 intact RBC/HPF.    Comment for Leuk Est at 2300 on 12/24/20: Results can be falsely negative due to high specific gravity, some antibiotics, glucose >3 g/dl, or WBC other than neutrophils.      Lactate Results       12/24/20 2042           Lactate 1.2                           Microbiology Results     Procedure Component Value Units Date/Time    Blood Culture Blood, Venous [[id number]] Collected: 12/30/20 1835    Specimen: Blood, Venous Updated: 01/03/21 2300     Culture No growth at 96 hours    Blood Culture Blood, Venous [[id number]] Collected: 12/24/20 2042    Specimen: Blood, Venous Updated: 12/29/20 2301     Culture No growth at 120 hours    Blood Culture Blood, Venous [[id number]] Collected: 12/24/20 2042    Specimen: Blood, Venous Updated: 12/29/20 2301     Culture No growth at 120 hours           Pathology Results     ** No results found for the last 720 hours. **          Echo:          Imaging:    Radiology Imaging   XR CHEST 1 VW    Narrative CLINICAL HISTORY: Respiratory failure, noncardiac cause    COMPARISON: 12/27/2020.    COMMENT: 1 view of the chest was performed    There is interval progression of left mid to upper lung zone airspace opacity  and stable right lung base patchy airspace opacity.  There is no pneumothorax or pleural effusion.  The heart size and mediastinal contours are unchanged.  No acute osseous abnormality.      Impression IMPRESSION:    Interval progression in left mid to upper lobe airspace opacity when compared to  prior study.  Stable right mid to lower lung zone patchy airspace opacity.       Assessment     Covid-19 Pneumonia with acute hypoxic respiratory failure and possible secondary bacterial PNA  - on 12 liters NC  - afebrile  - no leukocytosis  - no sputum for culture  - blood sterile              Plan     Will adjust to oral ceftin for another 48 hours to complete course of antibiotics, continue supportive care, steroid mgmt per primary team

## 2021-01-04 NOTE — PROGRESS NOTES
Hospital Medicine Service -  Daily Progress Note       SUBJECTIVE   Interval History: Continues to have shortness of breath, he was lying relatively flat which he thinks exacerbated his hypoxia and caused a slight bump in O2 requirements. Denies fevers, chills, nausea, vomiting     OBJECTIVE      Vital signs in last 24 hours:  Temp:  [36.3 °C (97.4 °F)-36.6 °C (97.9 °F)] 36.3 °C (97.4 °F)  Heart Rate:  [51-83] 54  Resp:  [16-20] 20  BP: (106-130)/(60-75) 130/73    Intake/Output Summary (Last 24 hours) at 1/4/2021 1141  Last data filed at 1/3/2021 1635  Gross per 24 hour   Intake --   Output 1000 ml   Net -1000 ml       PHYSICAL EXAMINATION      Physical Exam  Vitals signs and nursing note reviewed.   Constitutional:       Appearance: Normal appearance. He is well-developed.   HENT:      Head: Normocephalic and atraumatic.      Mouth/Throat:      Mouth: Mucous membranes are moist.      Pharynx: Oropharynx is clear.   Eyes:      Extraocular Movements: Extraocular movements intact.      Pupils: Pupils are equal, round, and reactive to light.   Neck:      Musculoskeletal: Normal range of motion and neck supple.   Cardiovascular:      Rate and Rhythm: Normal rate and regular rhythm.      Pulses: Normal pulses.      Heart sounds: Normal heart sounds.   Pulmonary:      Effort: Pulmonary effort is normal.      Breath sounds: Normal breath sounds.   Abdominal:      General: Abdomen is flat. Bowel sounds are normal.      Palpations: Abdomen is soft.   Musculoskeletal: Normal range of motion.   Skin:     General: Skin is warm and dry.   Neurological:      General: No focal deficit present.      Mental Status: He is alert and oriented to person, place, and time. Mental status is at baseline.   Psychiatric:         Mood and Affect: Mood normal.         Behavior: Behavior normal.         Thought Content: Thought content normal.         Judgment: Judgment normal.        LINES, CATHETERS, DRAINS, AIRWAYS, AND WOUNDS   Lines,  Drains, Airways, Wounds:  Peripheral IV 01/01/21 Anterior;Left;Proximal Forearm (Active)   Number of days: 3       Comments:      LABS / IMAGING / TELE      Labs  I have reviewed the patient's pertinent labs.  Significant abnormals are Hb 12.3, .    Imaging  Not applicable    ECG/Telemetry  I have independently reviewed the telemetry. No events for the last 24 hours.    ASSESSMENT AND PLAN      Acute respiratory failure with hypoxia (CMS/HCC)  Assessment & Plan  Due to COVID-19  Prone protocol  Chest x-ray with progressive patchy airspace opacities  Pulmonary, infectious disease following  Currently on salter at 12 LPM, wean as tolerated    Pancytopenia (CMS/HCC)  Assessment & Plan  Likely related to acute infection  Monitor CBC w/daily labs  Consult heme/onc, signed off now that numbers are improving  Improving daily    Coronary artery disease involving native coronary artery of native heart without angina pectoris  Assessment & Plan  Chronic, asymptomatic  Continue Lipitor, ASA    * Pneumonia due to COVID-19 virus  Assessment & Plan  Covid pneumonia with acute hypoxic respiratory failure  Chest x-ray with progressive patchy airspace opacities noted bilaterally  Not a candidate for remdesivir as symptoms started 13 days prior to admission  Decadron transitioned to the high dose protocol, completed 20 mg x5 days, currently getting 10 mg x5 days  Cultures no growth to 72 hours  Empiric antibiotics for suspected secondary bacterial infection  Rocephin day 6  Continue prone protocol  Trend inflammatory markers       VTE Assessment: Padua VTE Score: 1  VTE Prophylaxis Plan:Lovenox 0.5 mg/kg SQ BID  Code Status: Full Code  Estimated Discharge Date: 1/8/2021   Disposition Planning: Pending improvement of oxygen requirements     Salinas Villalta DO  1/4/2021

## 2021-01-05 LAB
ALBUMIN SERPL-MCNC: 2.5 G/DL (ref 3.4–5)
ALP SERPL-CCNC: 46 IU/L (ref 35–126)
ALT SERPL-CCNC: 81 IU/L (ref 16–63)
ANION GAP SERPL CALC-SCNC: 8 MEQ/L (ref 3–15)
AST SERPL-CCNC: 31 IU/L (ref 15–41)
BASOPHILS # BLD: 0 K/UL (ref 0.01–0.1)
BASOPHILS NFR BLD: 0 %
BILIRUB SERPL-MCNC: 1.2 MG/DL (ref 0.3–1.2)
BUN SERPL-MCNC: 22 MG/DL (ref 8–20)
CALCIUM SERPL-MCNC: 8.4 MG/DL (ref 8.9–10.3)
CHLORIDE SERPL-SCNC: 104 MEQ/L (ref 98–109)
CK SERPL-CCNC: 17 U/L (ref 16–300)
CO2 SERPL-SCNC: 24 MEQ/L (ref 22–32)
CREAT SERPL-MCNC: 0.5 MG/DL (ref 0.8–1.3)
CRP SERPL-MCNC: 8.5 MG/L
DIFFERENTIAL METHOD BLD: ABNORMAL
EOSINOPHIL # BLD: 0 K/UL (ref 0.04–0.54)
EOSINOPHIL NFR BLD: 0 %
ERYTHROCYTE [DISTWIDTH] IN BLOOD BY AUTOMATED COUNT: 13.1 % (ref 11.6–14.4)
FERRITIN SERPL-MCNC: 1733 NG/ML (ref 24–250)
FERRITIN SERPL-MCNC: 1786 NG/ML (ref 24–250)
GFR SERPL CREATININE-BSD FRML MDRD: >60 ML/MIN/1.73M*2
GLUCOSE SERPL-MCNC: 96 MG/DL (ref 70–99)
HCT VFR BLDCO AUTO: 34.6 % (ref 40.1–51)
HGB BLD-MCNC: 11.9 G/DL (ref 13.7–17.5)
IMM GRANULOCYTES # BLD AUTO: 0.02 K/UL (ref 0–0.08)
IMM GRANULOCYTES NFR BLD AUTO: 0.3 %
LDH SERPL L TO P-CCNC: 322 IU/L (ref 98–192)
LYMPHOCYTES # BLD: 0.7 K/UL (ref 1.2–3.5)
LYMPHOCYTES NFR BLD: 11.2 %
MCH RBC QN AUTO: 30.7 PG (ref 28–33.2)
MCHC RBC AUTO-ENTMCNC: 34.4 G/DL (ref 32.2–36.5)
MCV RBC AUTO: 89.2 FL (ref 83–98)
MONOCYTES # BLD: 0.16 K/UL (ref 0.3–1)
MONOCYTES NFR BLD: 2.6 %
NEUTROPHILS # BLD: 5.35 K/UL (ref 1.7–7)
NEUTS SEG NFR BLD: 85.9 %
NRBC BLD-RTO: 0 %
PDW BLD AUTO: 11.7 FL (ref 9.4–12.4)
PLATELET # BLD AUTO: 184 K/UL (ref 150–350)
PLATELET # BLD EST: ABNORMAL 10*3/UL
PLATELET CLUMP BLD QL SMEAR: PRESENT
POTASSIUM SERPL-SCNC: 4.5 MEQ/L (ref 3.6–5.1)
PROT SERPL-MCNC: 6.1 G/DL (ref 6–8.2)
RBC # BLD AUTO: 3.88 M/UL (ref 4.5–5.8)
SODIUM SERPL-SCNC: 136 MEQ/L (ref 136–144)
WBC # BLD AUTO: 6.23 K/UL (ref 3.8–10.5)

## 2021-01-05 PROCEDURE — 20600000 HC ROOM AND CARE INTERMEDIATE/TELEMETRY

## 2021-01-05 PROCEDURE — 82728 ASSAY OF FERRITIN: CPT | Performed by: HOSPITALIST

## 2021-01-05 PROCEDURE — 63600000 HC DRUGS/DETAIL CODE: Performed by: INTERNAL MEDICINE

## 2021-01-05 PROCEDURE — 85025 COMPLETE CBC W/AUTO DIFF WBC: CPT | Performed by: HOSPITALIST

## 2021-01-05 PROCEDURE — 80053 COMPREHEN METABOLIC PANEL: CPT | Performed by: HOSPITALIST

## 2021-01-05 PROCEDURE — 83615 LACTATE (LD) (LDH) ENZYME: CPT | Performed by: HOSPITALIST

## 2021-01-05 PROCEDURE — 82550 ASSAY OF CK (CPK): CPT | Performed by: HOSPITALIST

## 2021-01-05 PROCEDURE — 63700000 HC SELF-ADMINISTRABLE DRUG: Performed by: NURSE PRACTITIONER

## 2021-01-05 PROCEDURE — 86140 C-REACTIVE PROTEIN: CPT | Performed by: HOSPITALIST

## 2021-01-05 PROCEDURE — 99233 SBSQ HOSP IP/OBS HIGH 50: CPT | Mod: CR | Performed by: INTERNAL MEDICINE

## 2021-01-05 PROCEDURE — 63700000 HC SELF-ADMINISTRABLE DRUG: Performed by: HOSPITALIST

## 2021-01-05 RX ADMIN — ATORVASTATIN CALCIUM 10 MG: 10 TABLET, FILM COATED ORAL at 18:08

## 2021-01-05 RX ADMIN — DEXAMETHASONE SODIUM PHOSPHATE 10 MG: 4 INJECTION, SOLUTION INTRA-ARTICULAR; INTRALESIONAL; INTRAMUSCULAR; INTRAVENOUS; SOFT TISSUE at 08:22

## 2021-01-05 RX ADMIN — ENOXAPARIN SODIUM 40 MG: 40 INJECTION SUBCUTANEOUS at 18:08

## 2021-01-05 RX ADMIN — CEFUROXIME AXETIL 500 MG: 250 TABLET, FILM COATED ORAL at 22:17

## 2021-01-05 RX ADMIN — THERA TABS 1 TABLET: TAB at 08:22

## 2021-01-05 RX ADMIN — Medication 5 MG: at 22:18

## 2021-01-05 RX ADMIN — CEFUROXIME AXETIL 500 MG: 250 TABLET, FILM COATED ORAL at 08:22

## 2021-01-05 RX ADMIN — ASPIRIN 81 MG: 81 TABLET, COATED ORAL at 08:22

## 2021-01-05 RX ADMIN — ENOXAPARIN SODIUM 40 MG: 40 INJECTION SUBCUTANEOUS at 06:23

## 2021-01-05 NOTE — PROGRESS NOTES
Hospital Medicine Service -  Daily Progress Note       SUBJECTIVE   Interval History: Comfortable, he is worried about desat episodes qhs. Reassured him. Explained ongoing need for weaning and that he will likely need oxygen at discharge once his requirements are better. Very little shortness of breath. Denies coughing, wheezing, fevers, chills.     OBJECTIVE      Vital signs in last 24 hours:  Temp:  [36.3 °C (97.4 °F)-37.1 °C (98.8 °F)] 37.1 °C (98.8 °F)  Heart Rate:  [45-72] 72  Resp:  [18-20] 19  BP: (100-130)/(54-73) 100/64    Intake/Output Summary (Last 24 hours) at 1/5/2021 1015  Last data filed at 1/5/2021 0400  Gross per 24 hour   Intake --   Output 400 ml   Net -400 ml       PHYSICAL EXAMINATION      Physical Exam  Vitals signs and nursing note reviewed.   Constitutional:       Appearance: Normal appearance. He is well-developed.   HENT:      Head: Normocephalic and atraumatic.      Mouth/Throat:      Mouth: Mucous membranes are moist.      Pharynx: Oropharynx is clear.   Eyes:      Extraocular Movements: Extraocular movements intact.      Pupils: Pupils are equal, round, and reactive to light.   Neck:      Musculoskeletal: Normal range of motion and neck supple.   Cardiovascular:      Rate and Rhythm: Normal rate and regular rhythm.      Pulses: Normal pulses.      Heart sounds: Normal heart sounds.   Pulmonary:      Effort: Pulmonary effort is normal.      Breath sounds: Normal breath sounds.   Abdominal:      General: Abdomen is flat. Bowel sounds are normal.      Palpations: Abdomen is soft.   Musculoskeletal: Normal range of motion.   Skin:     General: Skin is warm and dry.   Neurological:      General: No focal deficit present.      Mental Status: He is alert and oriented to person, place, and time. Mental status is at baseline.   Psychiatric:         Mood and Affect: Mood normal.         Behavior: Behavior normal.         Thought Content: Thought content normal.         Judgment: Judgment  normal.        LINES, CATHETERS, DRAINS, AIRWAYS, AND WOUNDS   Lines, Drains, Airways, Wounds:  Peripheral IV 01/01/21 Anterior;Left;Proximal Forearm (Active)   Number of days: 4       Comments:      LABS / IMAGING / TELE      Labs  Hb 11.9,     Imaging  Not applicable    ECG/Telemetry  I have independently reviewed the telemetry. No events for the last 24 hours.    ASSESSMENT AND PLAN      Acute respiratory failure with hypoxia (CMS/HCC)  Assessment & Plan  Due to COVID-19  Prone protocol  Chest x-ray with progressive patchy airspace opacities  Pulmonary, infectious disease following  Requirements fluctuating between 8-12 L on salter, continue to wean as tolerated    Pancytopenia (CMS/HCC)  Assessment & Plan  Likely related to acute infection  Monitor CBC w/daily labs  Consult heme/onc, signed off now that numbers are improving  Improving daily    Coronary artery disease involving native coronary artery of native heart without angina pectoris  Assessment & Plan  Chronic, asymptomatic  Continue Lipitor, ASA    * Pneumonia due to COVID-19 virus  Assessment & Plan  Covid pneumonia with acute hypoxic respiratory failure  Chest x-ray with progressive patchy airspace opacities noted bilaterally  Not a candidate for remdesivir as symptoms started 13 days prior to admission  Decadron transitioned to the high dose protocol, completed 20 mg x5 days, currently getting 10 mg x5 days  Cultures no growth to 72 hours  Empiric antibiotics for suspected secondary bacterial infection  Rocephin transitioned to Ceftin to complete the course  Continue prone protocol  Trend inflammatory markers       VTE Assessment: [place] VTE Score: 1  VTE Prophylaxis Plan: Lovenox SQ  Code Status: Full Code  Estimated Discharge Date: 1/8/2021   Disposition Planning: Pending ongoing weaning of oxygen     [place] Villalta DO  1/5/2021

## 2021-01-05 NOTE — PROGRESS NOTES
Pulmonary Progress Note      Subjective:  Resting in bed. Comfortable on 8L [place] cannula.  Anxious regarding fluctuations in SpO2.  Dyspneic with exertion.   Appears comfortable at rest.  Discussed case w/ nursing.  Noted to desat into 80s overnight while lying on left side.  Requires increased FiO2 to improve sats.  Improves with lying on right side.  Denies any fevers, chills, chest congestion.  Chart/labs/vitals reviewed.    Allergies: Patient has no known allergies.    Medications:  Current Facility-Administered Medications   Medication Dose Route Frequency Provider Last Rate Last Admin   • acetaminophen (TYLENOL) tablet 650 mg  650 mg oral q4h PRN Debbie Whitfield MD   650 mg at 12/25/20 2130    Or   • acetaminophen (TYLENOL) suppository 650 mg  650 mg rectal q4h PRN Debbie Whitfield MD        Or   • acetaminophen (TYLENOL) 650 mg/20.3 mL solution 650 mg  650 mg oral q4h PRN Debbie Whitfield MD       • albuterol HFA (VENTOLIN HFA) 90 mcg/actuation inhaler 2 puff  2 puff inhalation q6h PRN Debbie Whitfield MD       • aspirin enteric coated tablet 81 mg  81 mg oral Daily Debbie Whitfield MD   81 mg at 01/05/21 0822   • atorvastatin (LIPITOR) tablet 10 mg  10 mg oral Daily (6p) Debbie Whitfield MD   10 mg at 01/04/21 1709   • cefUROXime (CEFTIN) tablet 500 mg  500 mg oral q12h Sanna Gutierrez CRNP   500 mg at 01/05/21 0822   • dexamethasone (DECADRON) injection 10 mg  10 mg intravenous Daily Reagan Haque MD   10 mg at 01/05/21 0822   • glucose chewable tablet 16-32 g of dextrose  16-32 g of dextrose oral PRN Debbie Whitfield MD        Or   • dextrose 40 % oral gel 15-30 g of dextrose  15-30 g of dextrose oral PRN Debbie Whitfield MD        Or   • glucagon (GLUCAGEN) injection 1 mg  1 mg intramuscular PRN Debbie Whitfield MD        Or   • dextrose in water injection 12.5 g  25 mL intravenous PRN Debbie Whitfield MD       • enoxaparin (LOVENOX) syringe 40 mg  0.5 mg/kg subcutaneous q12h (6a, 6p) Pawan,  DO [place]   40 mg at 01/05/21 0623   • melatonin capsule 5 mg  5 mg oral Nightly PRN Debbie Whitfield MD   5 mg at 12/25/20 2319   • morphine injection 1 mg  1 mg intravenous q3h PRN Reagan Haque MD       • multivitamin tablet 1 tablet  1 tablet oral Daily Debbie Whitfield MD   1 tablet at 01/05/21 0822   • ondansetron (ZOFRAN) injection 4 mg  4 mg intravenous q8h PRN Debbie Whitfield MD          Review of Systems:  Unchanged except as noted in HPI    Vital signs in last 24 hours:  Temp:  [36.3 °C (97.4 °F)-37.1 °C (98.8 °F)] 37.1 °C (98.8 °F)  Heart Rate:  [45-72] 72  Resp:  [18-20] 19  BP: (100-130)/(54-73) 100/64    Input/Ouput in Last 24 hours:    Intake/Output Summary (Last 24 hours) at 1/5/2021 1028  Last data filed at 1/5/2021 0400  Gross per 24 hour   Intake --   Output 400 ml   Net -400 ml     Physical Exam  General: Elderly male, NAD, comfortable on 8L [place] cannula  HEENT: Moist membranes, PERRL   Neck: Supple, no JVD, no tug or stridor, trach midline  Cardiac: Bradycardic, regular, +S1/S2, no murmur appreciated  Lungs: Normal effort, not in any acute distress  Decreased breath sounds at bases  Coarse bibasilar inspiratory rales  No wheeze/rhonchi  Abdomen: Soft, NT/ND, +BS, no rebound/guarding   : Deferred  Extremities: No edema, clubbing or cyanosis  Musculoskeletal: No joint deformity  Vascular: No ischemia noted  Neuro: AAOx3, nonfocal  Skin: Warm, limited exam, defer to attending/nursing  Psych: Cooperative, appropriate, anxious    Labs:    Lab Results   Component Value Date    WBC 6.23 01/05/2021    HGB 11.9 (L) 01/05/2021    HCT 34.6 (L) 01/05/2021     01/05/2021    ALT 81 (H) 01/05/2021    AST 31 01/05/2021     01/05/2021    K 4.5 01/05/2021     01/05/2021    CREATININE 0.5 (L) 01/05/2021    BUN 22 (H) 01/05/2021    CO2 24 01/05/2021    INR 1.1 12/25/2020     Imaging:  No new chest imaging to review.    ECG/Telemetry: Sinus bradycardia @ 55    IMPRESSION AND PLAN    Acute  hypoxic respiratory failure, slowly improving  Not O2 dependent at baseline. Oxygen requirements since secondary to COVID infection   - Weaned to 8 liter non-heated high flow via [place] cannula today.  -- SpO2 fluctuates with proning maneuvers per RN/pt. Requires increased FiO2 while lying on left side.   - Continue supplemental oxygen.  - Titrate FiO2 to maintain SpO2 90-96%  -- Continue to wean requirements as able  - Repeat ABG and CXR prn.  - Encourage modified proning and incentive spirometry.     Coronavirus (COVID-19) infection  PCR positive on 12/14.  - Maintain contact and droplet precautions  - Monitor inflammatory markers intermittently  - Completed 20mg IV Decadron qDay 1/3/21.  -- Decreased to 10mg qDay 1/3. Day #3/5 of therapy.  - ID following. Supportive care  - DVT ppx w/ LMWH 0.5mg/kg BID   - Albuterol HFA PRN   - Repeat chest x-ray PRN.  - Outpatient follow up imaging in 6-8 weeks to ensure resolution of infiltrates.     Former Smoker  30 pack year history. Quit 26 years ago     -- Additional history per EMR/attending.     Diet: Regular consistency, 2gm potassium  DVT prophylaxis: sq Lovenox 40mg BID  Code status: Full code  Case d/w: patient, DEJAH Castellon

## 2021-01-05 NOTE — PATIENT CARE CONFERENCE
Care Progression Rounds Note  Date: 1/5/2021  Time: 10:11 AM     Patient Name: Nadeem Morales     Medical Record Number: [id number]   YOB: 1955  Sex: Male      Room/Bed: 4507W    Admitting Diagnosis: Shortness of breath [R06.02]  Hypoxia [R09.02]  COVID-19 virus infection [U07.1]  Pneumonia due to COVID-19 virus [U07.1, J12.82]   Admit Date/Time: 12/24/2020  8:28 PM    Primary Diagnosis: Pneumonia due to COVID-19 virus  Principal Problem: Pneumonia due to COVID-19 virus    GMLOS: 5.4  Anticipated Discharge Date: 1/8/2021    AM-PAC  Mobility Score:      Discharge Planning:  Living Arrangements: house  Anticipated Discharge Disposition: home without services    Barriers to Discharge:  Barriers to Discharge: Medical issues not resolved  Comment: weaned to 028lnc salter; desats at noc on right side;     Participants:  nursing, , dietitian/nutrition services

## 2021-01-05 NOTE — PLAN OF CARE
Problem: Adult Inpatient Plan of Care  Goal: Plan of Care Review  Flowsheets (Taken 1/5/2021 0545)  Plan of Care Reviewed With: patient  Outcome Summary:  Patient discussed in care progression rounds; weaning down O2. Spoke w/ patient via bedside telephone to introduce home care services as will likely require home oxygen at discharge. At this time patient is hoping to go home w/o supplemental oxygen and declines need for home care. Will continue to follow and assist w/ discharge plan as needed.

## 2021-01-05 NOTE — PROGRESS NOTES
Infectious Disease Progress Note    Patient Name: Nadeem Morales  MR#: [id number]  : 1955  Admission Date: 2020  Date: 21   Time: 1:15 PM   Author: QI Srinivasan    Major Events:     Antibiotics:    Anti-infectives (From admission, onward)    Start     Dose/Rate Route Frequency Ordered Stop    21 1800  cefUROXime (CEFTIN) tablet 500 mg      500 mg oral Every 12 hours 21 1210 21          Subjective   Patient states he is feeling a lot better and would like to keep moving the oxygen down and see how he does because he denies any shortness of breath and hopeful to go home soon    Review of Systems    Pertinent items are noted in HPI.    Objective     Vital Signs:    Patient Vitals for the past 72 hrs:   BP Temp Temp src Pulse Resp SpO2   21 0756 100/64 37.1 °C (98.8 °F) Oral 72 19 93 %   21 0743 -- -- -- -- 18 97 %   21 0400 109/67 36.7 °C (98.1 °F) Oral (!) 50 18 95 %   21 0000 (!) 106/54 36.7 °C (98.1 °F) Oral (!) 45 18 94 %   21 2000 110/61 36.6 °C (97.9 °F) Oral 60 20 94 %   21 1557 113/71 36.8 °C (98.2 °F) Oral (!) 57 20 97 %   21 1134 130/73 36.3 °C (97.4 °F) Oral (!) 54 20 95 %   21 0835 106/60 36.4 °C (97.5 °F) Oral 61 20 92 %   21 0800 -- -- -- 60 -- --   21 0739 -- -- -- -- 20 (!) 90 %   21 0400 115/67 36.5 °C (97.7 °F) Oral 83 16 95 %   21 0026 115/66 36.6 °C (97.9 °F) Oral 81 16 99 %   21 115/62 36.4 °C (97.6 °F) Oral 80 16 99 %   21 1700 -- -- -- -- -- 97 %   21 1600 115/75 36.5 °C (97.7 °F) Oral (!) 51 16 95 %   21 1257 -- -- -- -- -- 95 %   21 1149 114/66 36.5 °C (97.7 °F) Oral 70 18 99 %   21 1032 -- -- -- -- -- 100 %   21 0800 97/61 36.6 °C (97.8 °F) Oral 65 18 94 %   21 0754 -- -- -- -- -- 94 %   21 0444 (!) 100/57 36.3 °C (97.4 °F) Oral 73 18 94 %   21 0049 (!) 106/50 36.6 °C (97.9 °F) Oral 70 18 94 %  "  21 2358 -- -- -- -- -- 93 %   21 2032 117/64 36.7 °C (98 °F) Oral 71 18 95 %   21 1730 -- -- -- -- -- 94 %   21 1554 114/61 36.7 °C (98.1 °F) Oral 70 18 95 %   21 1553 -- -- -- -- -- 95 %       Temp (72hrs), Av.6 °C (97.9 °F), Min:36.3 °C (97.4 °F), Max:37.1 °C (98.8 °F)      Physical Exam:    Visit Vitals  /64   Pulse 72   Temp 37.1 °C (98.8 °F) (Oral)   Resp 19   Ht 1.753 m (5' 9.02\") Comment: per nsg   Wt 76.7 kg (169 lb 1.5 oz) Comment: per nsg   SpO2 93%   BMI 24.96 kg/m²     General appearance: alert, appears stated age, cooperative and no distress  Eyes: anicteric   Lungs: nonlabored, no cough  Extremities: no edema  Neurologic: Mental status: Alert, oriented, thought content appropriate    Lines, Drains, Airways, Wounds:  Peripheral IV 21 Anterior;Left;Proximal Forearm (Active)   Number of days: 4       Labs:    CBC Results       21                    0613 0509 0454         WBC 6.23 6.17 5.81         RBC 3.88 4.00 3.96         HGB 11.9 12.3 11.9         HCT 34.6 36.0 35.1         MCV 89.2 90.0 88.6         MCH 30.7 30.8 30.1         MCHC 34.4 34.2 33.9          142 246         Comment for PLT at 0613 on 21: RESULTS CHECKED      BMP Results       21                    0613 0509 0454          136 135         K 4.5 4.7 4.7         Cl 104 103 102         CO2 24 26 22         Glucose 96 98 116         BUN 22 22 22         Creatinine 0.5 0.6 0.6         Calcium 8.4 8.3 8.4         Anion Gap 8 7 11         EGFR >60.0 >60.0 >60.0                   PT/PTT Results       20                       0001 1124          PT 14.0 14.3          INR 1.1 1.1          PTT 37 32          Comment for INR at 0001 on 20: INR has no defined significance when PT is within Reference Range.    Comment for INR at 1124 on 20: INR has no defined significance when PT is within Reference Range.    " Comment for PTT at 0001 on 12/25/20: The Standard Therapeutic Range for Heparin is 68 to 101 seconds.      UA Results       12/24/20 2300           Color Yellow           Clarity Clear           Glucose Negative           Bilirubin Negative           Ketones +1           Sp Grav 1.023           Blood Negative           Ph 6.5           Protein +2           Urobilinogen 1.0           Nitrite Negative           Leuk Est Negative           WBC 0 TO 3           RBC 0 TO 4           Bacteria None Seen           Comment for Ketones at 2300 on 12/24/20: Free sulfhydryl drugs such as Mesna, Capoten, and Acetylcysteine (Mucomyst) may cause false positive ketonuria.    Comment for Blood at 2300 on 12/24/20: The sensitivity of the occult blood test is equivalent to approximately 4 intact RBC/HPF.    Comment for Leuk Est at 2300 on 12/24/20: Results can be falsely negative due to high specific gravity, some antibiotics, glucose >3 g/dl, or WBC other than neutrophils.      Lactate Results       12/24/20 2042           Lactate 1.2                           Microbiology Results     Procedure Component Value Units Date/Time    Blood Culture Blood, Venous [[id number]] Collected: 12/30/20 1835    Specimen: Blood, Venous Updated: 01/04/21 2300     Culture No growth at 120 hours    Blood Culture Blood, Venous [[id number]] Collected: 12/24/20 2042    Specimen: Blood, Venous Updated: 12/29/20 2301     Culture No growth at 120 hours    Blood Culture Blood, Venous [[id number]] Collected: 12/24/20 2042    Specimen: Blood, Venous Updated: 12/29/20 2301     Culture No growth at 120 hours          Pathology Results     ** No results found for the last 720 hours. **          Echo:          Imaging:    Radiology Imaging   XR CHEST 1 VW    Narrative CLINICAL HISTORY: Respiratory failure, noncardiac cause    COMPARISON: 12/27/2020.    COMMENT: 1 view of the chest was performed    There is interval  progression of left mid to upper lung zone airspace opacity  and stable right lung base patchy airspace opacity.  There is no pneumothorax or pleural effusion.  The heart size and mediastinal contours are unchanged.  No acute osseous abnormality.      Impression IMPRESSION:    Interval progression in left mid to upper lobe airspace opacity when compared to  prior study.  Stable right mid to lower lung zone patchy airspace opacity.       Assessment     Covid-19 Pneumonia with improving hypoxic respiratory failure and possible secondary bacterial PNA  - on 6 liters NC  - afebrile  - no leukocytosis  - no sputum for culture  - blood sterile           Plan     Completing ceftin, no additional therapeutics to add from an ID perspective, please recall ID service if any problems arise

## 2021-01-06 LAB
ALBUMIN SERPL-MCNC: 2.5 G/DL (ref 3.4–5)
ALP SERPL-CCNC: 47 IU/L (ref 35–126)
ALT SERPL-CCNC: 71 IU/L (ref 16–63)
ANION GAP SERPL CALC-SCNC: 8 MEQ/L (ref 3–15)
AST SERPL-CCNC: 25 IU/L (ref 15–41)
BASOPHILS # BLD: 0.01 K/UL (ref 0.01–0.1)
BASOPHILS NFR BLD: 0.2 %
BILIRUB SERPL-MCNC: 1.1 MG/DL (ref 0.3–1.2)
BUN SERPL-MCNC: 25 MG/DL (ref 8–20)
CALCIUM SERPL-MCNC: 8.4 MG/DL (ref 8.9–10.3)
CHLORIDE SERPL-SCNC: 102 MEQ/L (ref 98–109)
CO2 SERPL-SCNC: 25 MEQ/L (ref 22–32)
CREAT SERPL-MCNC: 0.7 MG/DL (ref 0.8–1.3)
DIFFERENTIAL METHOD BLD: ABNORMAL
EOSINOPHIL # BLD: 0 K/UL (ref 0.04–0.54)
EOSINOPHIL NFR BLD: 0 %
ERYTHROCYTE [DISTWIDTH] IN BLOOD BY AUTOMATED COUNT: 13.1 % (ref 11.6–14.4)
GFR SERPL CREATININE-BSD FRML MDRD: >60 ML/MIN/1.73M*2
GLUCOSE SERPL-MCNC: 99 MG/DL (ref 70–99)
HCT VFR BLDCO AUTO: 35.2 % (ref 40.1–51)
HGB BLD-MCNC: 12 G/DL (ref 13.7–17.5)
IMM GRANULOCYTES # BLD AUTO: 0.03 K/UL (ref 0–0.08)
IMM GRANULOCYTES NFR BLD AUTO: 0.5 %
LYMPHOCYTES # BLD: 0.76 K/UL (ref 1.2–3.5)
LYMPHOCYTES NFR BLD: 11.9 %
MCH RBC QN AUTO: 30.1 PG (ref 28–33.2)
MCHC RBC AUTO-ENTMCNC: 34.1 G/DL (ref 32.2–36.5)
MCV RBC AUTO: 88.2 FL (ref 83–98)
MONOCYTES # BLD: 0.23 K/UL (ref 0.3–1)
MONOCYTES NFR BLD: 3.6 %
NEUTROPHILS # BLD: 5.33 K/UL (ref 1.7–7)
NEUTS SEG NFR BLD: 83.8 %
NRBC BLD-RTO: 0 %
PDW BLD AUTO: 12.1 FL (ref 9.4–12.4)
PLATELET # BLD AUTO: 286 K/UL (ref 150–350)
POTASSIUM SERPL-SCNC: 4.5 MEQ/L (ref 3.6–5.1)
PROT SERPL-MCNC: 5.8 G/DL (ref 6–8.2)
RBC # BLD AUTO: 3.99 M/UL (ref 4.5–5.8)
SODIUM SERPL-SCNC: 135 MEQ/L (ref 136–144)
WBC # BLD AUTO: 6.36 K/UL (ref 3.8–10.5)

## 2021-01-06 PROCEDURE — 63600000 HC DRUGS/DETAIL CODE: Mod: JW | Performed by: INTERNAL MEDICINE

## 2021-01-06 PROCEDURE — 63700000 HC SELF-ADMINISTRABLE DRUG: Performed by: NURSE PRACTITIONER

## 2021-01-06 PROCEDURE — 80053 COMPREHEN METABOLIC PANEL: CPT | Performed by: HOSPITALIST

## 2021-01-06 PROCEDURE — 94761 N-INVAS EAR/PLS OXIMETRY MLT: CPT

## 2021-01-06 PROCEDURE — 63700000 HC SELF-ADMINISTRABLE DRUG: Performed by: HOSPITALIST

## 2021-01-06 PROCEDURE — 36415 COLL VENOUS BLD VENIPUNCTURE: CPT | Performed by: HOSPITALIST

## 2021-01-06 PROCEDURE — 20600000 HC ROOM AND CARE INTERMEDIATE/TELEMETRY

## 2021-01-06 PROCEDURE — 85025 COMPLETE CBC W/AUTO DIFF WBC: CPT | Performed by: HOSPITALIST

## 2021-01-06 PROCEDURE — 97165 OT EVAL LOW COMPLEX 30 MIN: CPT | Mod: GO

## 2021-01-06 PROCEDURE — 63600000 HC DRUGS/DETAIL CODE: Performed by: INTERNAL MEDICINE

## 2021-01-06 PROCEDURE — 97161 PT EVAL LOW COMPLEX 20 MIN: CPT | Mod: GP

## 2021-01-06 PROCEDURE — 99233 SBSQ HOSP IP/OBS HIGH 50: CPT | Mod: CR | Performed by: INTERNAL MEDICINE

## 2021-01-06 RX ADMIN — THERA TABS 1 TABLET: TAB at 09:23

## 2021-01-06 RX ADMIN — DEXAMETHASONE SODIUM PHOSPHATE 10 MG: 4 INJECTION, SOLUTION INTRA-ARTICULAR; INTRALESIONAL; INTRAMUSCULAR; INTRAVENOUS; SOFT TISSUE at 09:24

## 2021-01-06 RX ADMIN — ENOXAPARIN SODIUM 40 MG: 40 INJECTION SUBCUTANEOUS at 06:04

## 2021-01-06 RX ADMIN — CEFUROXIME AXETIL 500 MG: 250 TABLET, FILM COATED ORAL at 09:23

## 2021-01-06 RX ADMIN — Medication 5 MG: at 22:45

## 2021-01-06 RX ADMIN — ENOXAPARIN SODIUM 40 MG: 40 INJECTION SUBCUTANEOUS at 17:49

## 2021-01-06 RX ADMIN — ASPIRIN 81 MG: 81 TABLET, COATED ORAL at 09:23

## 2021-01-06 RX ADMIN — ATORVASTATIN CALCIUM 10 MG: 10 TABLET, FILM COATED ORAL at 17:49

## 2021-01-06 ASSESSMENT — COGNITIVE AND FUNCTIONAL STATUS - GENERAL
DRESSING REGULAR LOWER BODY CLOTHING: 4 - NONE
WALKING IN HOSPITAL ROOM: 4 - NONE
HELP NEEDED FOR BATHING: 4 - NONE
STANDING UP FROM CHAIR USING ARMS: 4 - NONE
MOVING TO AND FROM BED TO CHAIR: 4 - NONE
HELP NEEDED FOR PERSONAL GROOMING: 4 - NONE
AFFECT: WNL
EATING MEALS: 4 - NONE
TOILETING: 4 - NONE
AFFECT: WFL
DRESSING REGULAR UPPER BODY CLOTHING: 4 - NONE
CLIMB 3 TO 5 STEPS WITH RAILING: 4 - NONE

## 2021-01-06 NOTE — PROGRESS NOTES
Patient: Nadeem Morales  Location: [address] [address]  MRN: [id number]  Today's date: 1/6/2021     Pt received supine.  Pt left in chair on sheet and incontinence pad with alarm on.  Needs in place and call bell in reach.  RN aware.    Nadeem TSE is a 65 y.o. male admitted on 12/24/2020 with Pneumonia due to COVID-19 virus. Principal problem is Pneumonia due to COVID-19 virus.    Past Medical History  Nadeem TSE has a past medical history of Coronary artery disease and Lipid disorder.    History of Present Illness   Nadeem Morales is a 65 y.o. male with a past medical history of CAD (s/p acute MI and stent placement) who presents with SOB.  He is a former smoker (quit >20 years ago) and has no known history of asthma, COPD or other chronic lung disease. He was diagnosed with COVID and reports worsening SOB, fever, body aches and decreased O2 readings(SpO2 in 80s) on home pulse oximeter since that time. No associated nausea or vomiting, but there has been 1 episode of diarrhea. He endorses anosmia and ageusia, returning to the ER with concerns he is not improving.         Therapy Pain/Vitals     Row Name 01/06/21 1310 01/06/21 1320       Pain/Comfort/Sleep    Pain Rating (word): Rest  0 - no pain  --    Pain Rating (word): Activity  0 - no pain  --       Vital Signs    Pulse  (!) 59  --    SpO2  96 %  (!) 88 %    Patient Activity  At rest  Other (Comment) transfer OOB    Oxygen Therapy  Supplemental oxygen  Supplemental oxygen    O2 Delivery Method  Nasal cannula  Nasal cannula    BP  110/69  --    BP Location  Right upper arm  --    BP Method  Automatic  --    Patient Position  Sitting  --       Patient Observation    Observations  --  O2 incr with seated rest and cues to breathe          Prior Living Environment      Most Recent Value   Living Arrangements  house   Living Environment Comment  Pt lives with spouse, 2SH, 2STE, powder room          Prior Level of Function      Most Recent Value    Dominant Hand  right   Ambulation  independent   Transferring  independent   Toileting  independent   Bathing  independent   Dressing  independent   Eating  independent   Assistive Device/Animal Currently Used at Home  none          Occupational Profile      Most Recent Value   Reason for Services/Referral  ADL dysfunction   Successful Occupations  Works FT as a albert          OT Evaluation and Treatment - 01/06/21 1315        Time Calculation    Start Time  1315     Stop Time  1335     Time Calculation (min)  20 min        Session Details    Document Type  initial evaluation     Mode of Treatment  occupational therapy        General Information    Patient Profile Reviewed?  yes     Onset of Illness/Injury or Date of Surgery  12/24/21     Referring Physician  Dr. Villalta     General Observations of Patient  Pt rec'd resting in bed     Existing Precautions/Restrictions  contact;droplet;oxygen therapy device and L/min        Cognition/Psychosocial    Affect/Mental Status (Cognitive)  WFL     Behavioral Issues (Cognitive)  overwhelmed easily     Orientation Status (Cognition)  oriented x 3     Follows Commands (Cognition)  follows three step commands     Comment, Cognition  tangential in speech; difficult to redirect        Vision Assessment/Intervention    Visual Impairment/Limitations  WFL        Sensory Assessment (Somatosensory)    Sensory Assessment (Somatosensory)  UE sensation intact        Range of Motion (ROM)    Range of Motion  bilateral upper extremities;ROM is WFL        Strength (Manual Muscle Testing)    Strength (Manual Muscle Testing)  bilateral upper extremities;strength is WFL        Bed Mobility    [place], Supine to Sit  modified independence     Assistive Device (Bed Mobility)  head of bed elevated        Transfers    Transfers  stand pivot transfer        Sit to Stand Transfer    [place], Sit to Stand Transfer  independent        Stand to Sit Transfer    [place], Stand to Sit  Transfer  independent        Stand Pivot Transfer    [place], Stand Pivot/Stand Step Transfer  independent        Wheelchair Mobility/Management    Comment, Functional Mobility  Independent with funx mob without AD        Safety Issues, Functional Mobility    Impairments Affecting Function (Mobility)  endurance/activity tolerance        Balance    Balance Assessment  sit to stand dynamic balance;sitting static balance;standing static balance;standing dynamic balance     Static Sitting Balance  WFL     Sit to Stand Dynamic Balance  WFL     Static Standing Balance  WFL     Dynamic Standing Balance  WFL        Lower Body Dressing    Omega Assistance  threads left leg, underpants;threads right leg, underpants;pulls underpants up or down     [place]  modified independence     Position  edge of bed sitting     Adaptive Equipment  none        AM-PAC (TM) - ADL (Current Function)    Putting on and taking off regular lower body clothing?  4 - None     Bathing?  4 - None     Toileting?  4 - None     Putting on/taking off regular upper body clothing?  4 - None     How much help for taking care of personal grooming?  4 - None     Eating meals?  4 - None     AM-PAC (TM) ADL Score  24        Therapy Assessment/Plan (OT)    Therapy Frequency (OT)  evaluation only        Progress Summary (OT)    Daily Outcome Statement (OT)  OT eval completed.  Pt is able to complete all transfers and functional mobility with [place] without AD.  Pt demonstrates decreased endurance, requiring VCs for pursed lip breathing.  Pt tangential in speech and requires VCs for redirection.  No further acute OT needs.  D/C home with assist.     Symptoms Noted During/After Treatment  none        Therapy Plan Review/Discharge Plan (OT)    OT Recommended Discharge Disposition  home with assist     Anticipated Equipment Needs At Discharge (OT)  none                   Education provided this session. See the Patient Education summary report for  full details.

## 2021-01-06 NOTE — PLAN OF CARE
"  Problem: Adult Inpatient Plan of Care  Goal: Plan of Care Review  Outcome: Progressing  Flowsheets (Taken 1/6/2021 1451)  Progress: improving  Plan of Care Reviewed With: patient  Outcome Summary:   indep mobility, no further skilled IP PT needs   limited by O2 response     Problem: Adult Inpatient Plan of Care  Goal: Patient-Specific Goal (Individualization)  Outcome: Progressing  Flowsheets (Taken 1/6/2021 1451)  Patient-Specific Goals (Include Timeframe): \"I'm a nearly full time . I want to get back to it\"     Problem: Mobility Impairment  Goal: Optimal Mobility  Outcome: Progressing     "

## 2021-01-06 NOTE — PLAN OF CARE
Problem: Adult Inpatient Plan of Care  Goal: Plan of Care Review  Outcome: Progressing  Flowsheets (Taken 1/6/2021 1457)  Plan of Care Reviewed With: patient  Outcome Summary: OT eval completed.  Pt is able to complete all transfers and functional mobility with [place] without AD.  Pt demonstrates decreased endurance, requiring VCs for pursed lip breathing.  Pt tangential in speech and requires VCs for redirection.  No further acute OT needs.  D/C home with assist.     Problem: Self-Care Deficit  Goal: Improved Ability to Complete Activities of Daily Living  1/6/2021 1457 by Lorna Gonzalez, OT  Outcome: Progressing  1/6/2021 1456 by Lorna Gonzalez, OT  Outcome: Progressing

## 2021-01-06 NOTE — PATIENT CARE CONFERENCE
Care Progression Rounds Note  Date: 1/6/2021  Time: 10:02 AM     Patient Name: Nadeem Morales     Medical Record Number: [id number]   YOB: 1955  Sex: Male      Room/Bed: 4507W    Admitting Diagnosis: Shortness of breath [R06.02]  Hypoxia [R09.02]  COVID-19 virus infection [U07.1]  Pneumonia due to COVID-19 virus [U07.1, J12.82]   Admit Date/Time: 12/24/2020  8:28 PM    Primary Diagnosis: Pneumonia due to COVID-19 virus  Principal Problem: Pneumonia due to COVID-19 virus    GMLOS: 5.4  Anticipated Discharge Date: 1/8/2021    AM-PAC  Mobility Score:      Discharge Planning:  Living Arrangements: house  Anticipated Discharge Disposition: home without services    Barriers to Discharge:  Barriers to Discharge: Medical issues not resolved  Comment: 024lnc at rest; DOMINGUEZ w/activity; home 02 eval-failed; iv decadron    Participants:  nursing, , social work/services

## 2021-01-06 NOTE — PROGRESS NOTES
Patient: Nadeem Morales  Location: [address]  MRN: [id number]  Today's date: 1/6/2021     Pt in chair with call bell and all needs in reach; on posey; nurse notified. Pt with safe, indep functional mobility, no further skilled IP PT needs. Pt on 4L nc O2 t/o.    Nadeem TSE is a 65 y.o. male admitted on 12/24/2020 with Pneumonia due to COVID-19 virus. Principal problem is Pneumonia due to COVID-19 virus.    Past Medical History  Nadeem TSE has a past medical history of Coronary artery disease and Lipid disorder.    History of Present Illness   Nadeem Morales is a 65 y.o. male with a past medical history of CAD (s/p acute MI and stent placement) who presents with SOB.  He is a former smoker (quit >20 years ago) and has no known history of asthma, COPD or other chronic lung disease. He was diagnosed with COVID and reports worsening SOB, fever, body aches and decreased O2 readings(SpO2 in 80s) on home pulse oximeter since that time. No associated nausea or vomiting, but there has been 1 episode of diarrhea. He endorses anosmia and ageusia, returning to the ER with concerns he is not improving.       PT Vitals    Date/Time Pulse SpO2 Pt Activity O2 Therapy O2 Del Method BP BP Location BP Method Pt Position Observations [facility]   01/06/21 1310 59 96 % At rest Supplemental oxygen Nasal cannula 110/69 Right upper arm Automatic Sitting --    01/06/21 1320 -- 88 % Other (Comment) transfer OOB Supplemental oxygen Nasal cannula -- -- -- -- O2 incr with seated rest and cues to breathe       PT Pain    Date/Time Rating: Rest Rating: Activity [facility]   01/06/21 1310 0 - no pain 0 - no pain           Prior Living Environment      Most Recent Value   Living Arrangements  house   Living Environment Comment  Pt lives with spouse, 2SH, 2STE, powder room          Prior Level of Function      Most Recent Value   Dominant Hand  right   Ambulation  independent   Transferring  independent   Toileting  independent    Bathing  independent   Dressing  independent   Eating  independent   Assistive Device/Animal Currently Used at Home  none          PT Evaluation and Treatment - 01/06/21 1305        Time Calculation    Start Time  1305     Stop Time  1338     Time Calculation (min)  33 min        Session Details    Document Type  initial evaluation     Mode of Treatment  individual therapy;physical therapy        General Information    Patient Profile Reviewed?  yes     Onset of Illness/Injury or Date of Surgery  12/24/20     Referring Physician  Pawan     General Observations of Patient  pt in bed on 4L nc O2, talkative     Existing Precautions/Restrictions  contact;droplet;oxygen therapy device and L/min        Cognition/Psychosocial    Affect/Mental Status (Cognitive)  WNL     Orientation Status (Cognition)  oriented x 4        Range of Motion (ROM)    Range of Motion  ROM is WNL;bilateral lower extremities        Strength (Manual Muscle Testing)    Strength (Manual Muscle Testing)  bilateral lower extremities;strength is WNL        Bed Mobility    [place], Supine to Sit  modified independence        Sit to Stand Transfer    [place], Sit to Stand Transfer  independent        Stand to Sit Transfer    [place], Stand to Sit Transfer  independent        Stand Pivot Transfer    [place], Stand Pivot/Stand Step Transfer  independent        Gait Training    Comment  deferred due to drop in O2 sats with OOB but with indep skill        Safety Issues, Functional Mobility    Impairments Affecting Function (Mobility)  endurance/activity tolerance        Balance    Static Sitting Balance  WNL     Sit to Stand Dynamic Balance  WNL     Static Standing Balance  WNL     Dynamic Standing Balance  WNL        AM-PAC (TM) - Mobility (Current Function)    Turning from your back to your side while in a flat bed without using bedrails?  4 - None     Moving from lying on your back to sitting on the side of a flat bed without using  "bedrails?  4 - None     Moving to and from a bed to a chair?  4 - None     Standing up from a chair using your arms?  4 - None     To walk in a hospital room?  4 - None     Climbing 3-5 steps with a railing?  4 - None     AM-PAC (TM) Mobility Score  24        Therapy Assessment/Plan (PT)    Therapy Frequency (PT)  evaluation only     Criteria for Skilled Interventions Met (PT)  yes     Functional Level at Time of Evaluation (PT)  indep     Patient/Family Therapy Goals Statement (PT)  \"get back to myself\"        Progress Summary (PT)    Daily Outcome Statement (PT)  safe, indep baseline mobility; limited only by O2 response. No further skilled IP PT needs.     Symptoms Noted During/After Treatment  none        Therapy Plan Review/Discharge Plan (PT)    PT Recommended Discharge Disposition  home     Anticipated Equipment Needs at Discharge (PT Eval)  none     Therapy Plan Review (PT)  evaluation/treatment results reviewed;care plan/treatment goals reviewed;risks/benefits reviewed;current/potential barriers reviewed;participants voiced agreement with care plan;participants included;patient        Plan of Care Review    Plan of Care Reviewed With  patient                       Education provided this session. See the Patient Education summary report for full details.      "

## 2021-01-06 NOTE — PLAN OF CARE
Problem: Adult Inpatient Plan of Care  Goal: Plan of Care Review  Flowsheets (Taken 1/6/2021 1128)  Outcome Summary:   Patient discussed in care progression rounds; failed home O2 eval this AM. Will continue to follow to assist w/ d/c plan.

## 2021-01-06 NOTE — NURSING NOTE
Home Oxygen Qualification Protocol    Findings:  Room Air Spo2 at rest: 88 %  Room Air SpO2 % with ambulation: 84 %  SPO2 % ambulation with oxygen: (unable, pt had to sit for 10 minutes for sats to increase)    Conclusion:  Meets criteria for home oxygen: Yes       Oxygen Recommendation:  Recommended lpm at rest: 3 lpm  Recommended lpm with ambulation: (unable to walk, pt desatd to mid 80s and had to sit down)     Found patient on 2llnc resting in bed sats 95%. Left pt in bed on room air sats 95-97% when pt stood and was talking sats dropped quickly as low as 84%.  Had patient sit and placed on oygen started on 3 then up to 6 lpm.  Pt was still talking and it took about 10 minutes to get his sats up and stabilize him and then wean the oxygen down to  3lnc with sats in mid 90s..      The information above was collected from the most recent home oxygen qualification protocol performed on the patient.

## 2021-01-06 NOTE — HOSPITAL COURSE
Nadeem TSE is a 65 y.o. male admitted on 12/24/2020 with Pneumonia due to COVID-19 virus. Principal problem is Pneumonia due to COVID-19 virus.    Past Medical History  Nadeem TSE has a past medical history of Coronary artery disease and Lipid disorder.    History of Present Illness   Nadeem Morales is a 65 y.o. male with a past medical history of CAD (s/p acute MI and stent placement) who presents with SOB.  He is a former smoker (quit >20 years ago) and has no known history of asthma, COPD or other chronic lung disease. He was diagnosed with COVID and reports worsening SOB, fever, body aches and decreased O2 readings(SpO2 in 80s) on home pulse oximeter since that time. No associated nausea or vomiting, but there has been 1 episode of diarrhea. He endorses anosmia and ageusia, returning to the ER with concerns he is not improving.

## 2021-01-06 NOTE — PROGRESS NOTES
Hospital Medicine Service -  Daily Progress Note       SUBJECTIVE   Interval History: Feeling much better. Continues to have shortness of breat and became quickly symptomatic upon standing. He wants to try moving to the chair today.     OBJECTIVE      Vital signs in last 24 hours:  Temp:  [36.3 °C (97.4 °F)-37 °C (98.6 °F)] 36.3 °C (97.4 °F)  Heart Rate:  [50-78] 61  Resp:  [18-19] 18  BP: (106-117)/(61-77) 109/63  FiO2 (%) (Set):  [28 %] 28 %  No intake or output data in the 24 hours ending 01/06/21 1054    PHYSICAL EXAMINATION      Physical Exam  Vitals signs and nursing note reviewed.   Constitutional:       Appearance: Normal appearance. He is well-developed.   HENT:      Head: Normocephalic and atraumatic.      Mouth/Throat:      Mouth: Mucous membranes are moist.      Pharynx: Oropharynx is clear.   Eyes:      Extraocular Movements: Extraocular movements intact.      Pupils: Pupils are equal, round, and reactive to light.   Neck:      Musculoskeletal: Normal range of motion and neck supple.   Cardiovascular:      Rate and Rhythm: Normal rate and regular rhythm.      Pulses: Normal pulses.      Heart sounds: Normal heart sounds.   Pulmonary:      Effort: Pulmonary effort is normal.      Breath sounds: Normal breath sounds.   Abdominal:      General: Abdomen is flat. Bowel sounds are normal.      Palpations: Abdomen is soft.   Musculoskeletal: Normal range of motion.   Skin:     General: Skin is warm and dry.   Neurological:      General: No focal deficit present.      Mental Status: He is alert and oriented to person, place, and time. Mental status is at baseline.   Psychiatric:         Mood and Affect: Mood normal.         Behavior: Behavior normal.         Thought Content: Thought content normal.         Judgment: Judgment normal.        LINES, CATHETERS, DRAINS, AIRWAYS, AND WOUNDS   Lines, Drains, Airways, Wounds:  Peripheral IV 01/01/21 Anterior;Left;Proximal Forearm (Active)   Number of days: 5        Comments:      LABS / IMAGING / TELE      Labs  Hb 12    Imaging  Not applicable    ECG/Telemetry  I have independently reviewed the telemetry. No events for the last 24 hours.    ASSESSMENT AND PLAN      Acute respiratory failure with hypoxia (CMS/HCC)  Assessment & Plan  Due to COVID-19  Prone protocol  Chest x-ray with progressive patchy airspace opacities  Pulmonary, infectious disease following  Down to 4L at rest today  Attempted home oxygen screen but quickly desaturated with activity    Pancytopenia (CMS/HCC)  Assessment & Plan  Likely related to acute infection  Monitor CBC w/daily labs  Consult heme/onc, signed off now that numbers are improving  Improving daily    Coronary artery disease involving native coronary artery of native heart without angina pectoris  Assessment & Plan  Chronic, asymptomatic  Continue Lipitor, ASA    * Pneumonia due to COVID-19 virus  Assessment & Plan  Covid pneumonia with acute hypoxic respiratory failure  Chest x-ray with progressive patchy airspace opacities noted bilaterally  Not a candidate for remdesivir as symptoms started 13 days prior to admission  Decadron transitioned to the high dose protocol, completed 20 mg x5 days, currently getting 10 mg x5 days  Cultures no growth to 72 hours  Empiric antibiotics for suspected secondary bacterial infection  Rocephin transitioned to Ceftin to complete the course  Continue prone protocol  Trend inflammatory markers         VTE Assessment: Padua VTE Score: 1  VTE Prophylaxis Plan: Lovenox SQ  Code Status: Full Code  Estimated Discharge Date: 1/8/2021   Disposition Planning: Pending ongoing improvement of oxygenation. Making good improvement but certainly needs more time in patient.     Salinas Villalta, DO  1/6/2021

## 2021-01-06 NOTE — PROGRESS NOTES
Pulmonary Progress Note      Subjective:  Resting in bed. Comfortable on 4 liter nasal cannula.  Offers no acute complaints. Feeling okay at rest.  Notes significant dyspnea w/ exertion today.  Home O2 evaluation attempted.  Unable to complete due to symptoms.  Frustrated with hospitalization course. Anxious for discharge.  Reassurance provided to patient.  Discussed case w/ RN.   No acute respiratory events noted overnight.  Denies any fevers, chills, chest congestion, sputum production.  Chart/labs/vitals reviewed.    Allergies: Patient has no known allergies.    Medications:  Current Facility-Administered Medications   Medication Dose Route Frequency Provider Last Rate Last Admin   • acetaminophen (TYLENOL) tablet 650 mg  650 mg oral q4h PRN Debbie Whitfield MD   650 mg at 12/25/20 2130    Or   • acetaminophen (TYLENOL) suppository 650 mg  650 mg rectal q4h PRN Debbie Whitfield MD        Or   • acetaminophen (TYLENOL) 650 mg/20.3 mL solution 650 mg  650 mg oral q4h PRN Debbie Whitfield MD       • albuterol HFA (VENTOLIN HFA) 90 mcg/actuation inhaler 2 puff  2 puff inhalation q6h PRN Debbie Whitfield MD       • aspirin enteric coated tablet 81 mg  81 mg oral Daily Debbie Whitfield MD   81 mg at 01/06/21 0923   • atorvastatin (LIPITOR) tablet 10 mg  10 mg oral Daily (6p) Debbie Whitfield MD   10 mg at 01/05/21 1808   • dexamethasone (DECADRON) injection 10 mg  10 mg intravenous Daily Reagan Haque MD   10 mg at 01/06/21 0924   • glucose chewable tablet 16-32 g of dextrose  16-32 g of dextrose oral PRN Debbie Whitfield MD        Or   • dextrose 40 % oral gel 15-30 g of dextrose  15-30 g of dextrose oral PRN Debbie Whitfield MD        Or   • glucagon (GLUCAGEN) injection 1 mg  1 mg intramuscular PRN Debbie Whitfield MD        Or   • dextrose in water injection 12.5 g  25 mL intravenous PRN Debbie Whitfield MD       • enoxaparin (LOVENOX) syringe 40 mg  0.5 mg/kg subcutaneous q12h (6a, 6p) Salinas Villalta DO   40 mg at  01/06/21 0604   • melatonin capsule 5 mg  5 mg oral Nightly PRN Debbie Whitfield MD   5 mg at 01/05/21 2218   • morphine injection 1 mg  1 mg intravenous q3h PRN Reagan Haque MD       • multivitamin tablet 1 tablet  1 tablet oral Daily Debbie Whitfield MD   1 tablet at 01/06/21 0923   • ondansetron (ZOFRAN) injection 4 mg  4 mg intravenous q8h PRN Debbie Whitfield MD          Review of Systems:  Unchanged except as noted in HPI    Vital signs in last 24 hours:  Temp:  [36.3 °C (97.4 °F)-36.8 °C (98.3 °F)] 36.4 °C (97.6 °F)  Heart Rate:  [50-63] 53  Resp:  [18-19] 18  BP: (106-125)/(61-81) 125/81  FiO2 (%) (Set):  [28 %] 28 %    Ventilator/Respiratory:  FiO2 (%) (Set):  [28 %] 28 %    Input/Ouput in Last 24 hours:  No intake or output data in the 24 hours ending 01/06/21 1355    Physical Exam  General: Elderly male, NAD, comfortable on 4 liter nasal cannula  HEENT: Moist membranes, PERRL   Neck: Supple, no JVD, no tug or stridor, trach midline  Cardiac: RRR, +S1/S2, no murmur appreciated  Lungs: Normal effort, not in any acute distress  Diminished breath sounds, greatest at bases  Fine bibasilar inspiratory rales  No wheeze/rhonchi  Abdomen: Soft, NT/ND, +BS, no rebound/guarding   : Deferred  Extremities: No edema, clubbing or cyanosis  Musculoskeletal: No joint deformity  Vascular: No ischemia noted  Neuro: AAOx3, nonfocal  Skin: Warm, limited exam, defer to attending/nursing  Psych: Cooperative, appropriate, frustrated    Labs:    Lab Results   Component Value Date    WBC 6.36 01/06/2021    HGB 12.0 (L) 01/06/2021    HCT 35.2 (L) 01/06/2021     01/06/2021    ALT 71 (H) 01/06/2021    AST 25 01/06/2021     (L) 01/06/2021    K 4.5 01/06/2021     01/06/2021    CREATININE 0.7 (L) 01/06/2021    BUN 25 (H) 01/06/2021    CO2 25 01/06/2021    INR 1.1 12/25/2020     Imaging:  No new chest imaging to review.    ECG/Telemetry: Sinus rhythm on telemetry    IMPRESSION AND  PLAN    Acute hypoxic respiratory failure, slowly improving  Not O2 dependent at baseline. Oxygen requirements since secondary to COVID infection   - Weaned to 4 liter low flow nasal cannula today.  -- SpO2 drops into 80s with minimal exertion per RN.  - Continue supplemental O2 to maintain SpO2 >= 90%  -- Wean as condition permits.  - Incentive spirometry encouraged.  - Modified proning as tolerated.  - Home O2 eval completed today.  -- SpO2 88% at rest, 84% with ambulation on room air.   -- Unable to test with oxygen due to significant dyspnea/tachypnea.   - Persistent desaturations likely due to extensive multifocal airspace disease and prolonged hospitalization.   - Low suspicion for DVT/PE given adequate VTE prophylaxis.  - Repeat ABG and CXR prn.     Coronavirus (COVID-19) infection  PCR positive on 12/14.  - Maintain contact and droplet precautions  - Monitor inflammatory markers intermittently  - Completed 20mg IV Decadron qDay 1/3/21.  -- Decreased to 10mg qDay 1/3. Day #4/5 of therapy.  - ID following. Supportive care  - DVT ppx w/ LMWH 0.5mg/kg BID   - Albuterol HFA PRN   - Repeat chest x-ray PRN.  - Outpatient follow up imaging in 6-8 weeks to ensure resolution of infiltrates.     Former Smoker  30 pack year history. Quit 26 years ago     -- Additional history per EMR/attending.     Diet: Regular consistency, 2gm potassium  DVT prophylaxis: sq Lovenox 40mg BID  Code status: Full code  Case d/w: patient, RN [place], RT Luis Enrique

## 2021-01-07 LAB
ALBUMIN SERPL-MCNC: 2.6 G/DL (ref 3.4–5)
ALP SERPL-CCNC: 47 IU/L (ref 35–126)
ALT SERPL-CCNC: 71 IU/L (ref 16–63)
ANION GAP SERPL CALC-SCNC: 9 MEQ/L (ref 3–15)
AST SERPL-CCNC: 29 IU/L (ref 15–41)
BASOPHILS # BLD: 0.01 K/UL (ref 0.01–0.1)
BASOPHILS NFR BLD: 0.2 %
BILIRUB SERPL-MCNC: 1.1 MG/DL (ref 0.3–1.2)
BUN SERPL-MCNC: 22 MG/DL (ref 8–20)
CALCIUM SERPL-MCNC: 8.4 MG/DL (ref 8.9–10.3)
CHLORIDE SERPL-SCNC: 102 MEQ/L (ref 98–109)
CK SERPL-CCNC: 177 U/L (ref 16–300)
CO2 SERPL-SCNC: 24 MEQ/L (ref 22–32)
CREAT SERPL-MCNC: 0.5 MG/DL (ref 0.8–1.3)
CRP SERPL-MCNC: <6 MG/L
DIFFERENTIAL METHOD BLD: ABNORMAL
EOSINOPHIL # BLD: 0 K/UL (ref 0.04–0.54)
EOSINOPHIL NFR BLD: 0 %
ERYTHROCYTE [DISTWIDTH] IN BLOOD BY AUTOMATED COUNT: 13.2 % (ref 11.6–14.4)
FERRITIN SERPL-MCNC: 1861 NG/ML (ref 24–250)
GFR SERPL CREATININE-BSD FRML MDRD: >60 ML/MIN/1.73M*2
GLUCOSE SERPL-MCNC: 104 MG/DL (ref 70–99)
HCT VFR BLDCO AUTO: 36.4 % (ref 40.1–51)
HGB BLD-MCNC: 12.3 G/DL (ref 13.7–17.5)
IMM GRANULOCYTES # BLD AUTO: 0.05 K/UL (ref 0–0.08)
IMM GRANULOCYTES NFR BLD AUTO: 0.9 %
LDH SERPL L TO P-CCNC: 337 IU/L (ref 98–192)
LYMPHOCYTES # BLD: 0.72 K/UL (ref 1.2–3.5)
LYMPHOCYTES NFR BLD: 12.7 %
MCH RBC QN AUTO: 30.1 PG (ref 28–33.2)
MCHC RBC AUTO-ENTMCNC: 33.8 G/DL (ref 32.2–36.5)
MCV RBC AUTO: 89 FL (ref 83–98)
MONOCYTES # BLD: 0.23 K/UL (ref 0.3–1)
MONOCYTES NFR BLD: 4 %
NEUTROPHILS # BLD: 4.67 K/UL (ref 1.7–7)
NEUTS SEG NFR BLD: 82.2 %
NRBC BLD-RTO: 0 %
PDW BLD AUTO: 11.5 FL (ref 9.4–12.4)
PLATELET # BLD AUTO: 284 K/UL (ref 150–350)
POTASSIUM SERPL-SCNC: 4.7 MEQ/L (ref 3.6–5.1)
PROT SERPL-MCNC: 6 G/DL (ref 6–8.2)
RBC # BLD AUTO: 4.09 M/UL (ref 4.5–5.8)
SODIUM SERPL-SCNC: 135 MEQ/L (ref 136–144)
WBC # BLD AUTO: 5.68 K/UL (ref 3.8–10.5)

## 2021-01-07 PROCEDURE — 86140 C-REACTIVE PROTEIN: CPT | Performed by: HOSPITALIST

## 2021-01-07 PROCEDURE — 82728 ASSAY OF FERRITIN: CPT | Performed by: HOSPITALIST

## 2021-01-07 PROCEDURE — 80053 COMPREHEN METABOLIC PANEL: CPT | Performed by: HOSPITALIST

## 2021-01-07 PROCEDURE — 20600000 HC ROOM AND CARE INTERMEDIATE/TELEMETRY

## 2021-01-07 PROCEDURE — 82550 ASSAY OF CK (CPK): CPT | Performed by: HOSPITALIST

## 2021-01-07 PROCEDURE — 63600000 HC DRUGS/DETAIL CODE: Performed by: INTERNAL MEDICINE

## 2021-01-07 PROCEDURE — 63700000 HC SELF-ADMINISTRABLE DRUG: Performed by: HOSPITALIST

## 2021-01-07 PROCEDURE — 85025 COMPLETE CBC W/AUTO DIFF WBC: CPT | Performed by: HOSPITALIST

## 2021-01-07 PROCEDURE — 99232 SBSQ HOSP IP/OBS MODERATE 35: CPT | Mod: CR | Performed by: INTERNAL MEDICINE

## 2021-01-07 PROCEDURE — 83615 LACTATE (LD) (LDH) ENZYME: CPT | Performed by: HOSPITALIST

## 2021-01-07 PROCEDURE — 36415 COLL VENOUS BLD VENIPUNCTURE: CPT | Performed by: HOSPITALIST

## 2021-01-07 RX ADMIN — ASPIRIN 81 MG: 81 TABLET, COATED ORAL at 09:06

## 2021-01-07 RX ADMIN — ENOXAPARIN SODIUM 40 MG: 40 INJECTION SUBCUTANEOUS at 06:03

## 2021-01-07 RX ADMIN — ATORVASTATIN CALCIUM 10 MG: 10 TABLET, FILM COATED ORAL at 17:01

## 2021-01-07 RX ADMIN — DEXAMETHASONE SODIUM PHOSPHATE 10 MG: 4 INJECTION, SOLUTION INTRA-ARTICULAR; INTRALESIONAL; INTRAMUSCULAR; INTRAVENOUS; SOFT TISSUE at 09:40

## 2021-01-07 RX ADMIN — THERA TABS 1 TABLET: TAB at 09:06

## 2021-01-07 RX ADMIN — ENOXAPARIN SODIUM 40 MG: 40 INJECTION SUBCUTANEOUS at 17:01

## 2021-01-07 RX ADMIN — Medication 5 MG: at 23:26

## 2021-01-07 NOTE — PATIENT CARE CONFERENCE
Care Progression Rounds Note  Date: 1/7/2021  Time: 10:15 AM     Patient Name: Nadeem Morales     Medical Record Number: [id number]   YOB: 1955  Sex: Male      Room/Bed: 4507W    Admitting Diagnosis: Shortness of breath [R06.02]  Hypoxia [R09.02]  COVID-19 virus infection [U07.1]  Pneumonia due to COVID-19 virus [U07.1, J12.82]   Admit Date/Time: 12/24/2020  8:28 PM    Primary Diagnosis: Pneumonia due to COVID-19 virus  Principal Problem: Pneumonia due to COVID-19 virus    GMLOS: 5.4  Anticipated Discharge Date: 1/8/2021    AM-PAC  Mobility Score: 24    Discharge Planning:  Living Arrangements: house  Anticipated Discharge Disposition: home without services    Barriers to Discharge:  Barriers to Discharge: Medical issues not resolved  Comment: 022lnc; 80% w/ambulation; poss d/c tomorrow    Participants:  nursing, dietitian/nutrition services, , social work/services

## 2021-01-07 NOTE — PROGRESS NOTES
Hospital Medicine Service -  Daily Progress Note       SUBJECTIVE   Interval History: Pleasant, he wants to go home by this weekend and is agreeable to trying another oxygen screen tomorrow. He lives in a 2 floor house with bathing and bedroom upstairs. He has a half bath and a recliner downstairs with the rest of the amenities so if needed he can stay on a single level. Denies any shortness of breath and feels like he is better.     OBJECTIVE      Vital signs in last 24 hours:  Temp:  [36.3 °C (97.3 °F)-36.8 °C (98.2 °F)] 36.8 °C (98.2 °F)  Heart Rate:  [50-64] 57  Resp:  [18] 18  BP: (105-147)/(53-76) 110/61  No intake or output data in the 24 hours ending 01/07/21 1228    PHYSICAL EXAMINATION      Physical Exam  Vitals signs and nursing note reviewed.   Constitutional:       Appearance: Normal appearance. He is well-developed.   HENT:      Head: Normocephalic and atraumatic.      Mouth/Throat:      Mouth: Mucous membranes are moist.      Pharynx: Oropharynx is clear.   Eyes:      Extraocular Movements: Extraocular movements intact.      Pupils: Pupils are equal, round, and reactive to light.   Neck:      Musculoskeletal: Normal range of motion and neck supple.   Cardiovascular:      Rate and Rhythm: Normal rate and regular rhythm.      Pulses: Normal pulses.      Heart sounds: Normal heart sounds.   Pulmonary:      Effort: Pulmonary effort is normal.      Breath sounds: Normal breath sounds.   Abdominal:      General: Abdomen is flat. Bowel sounds are normal.      Palpations: Abdomen is soft.   Musculoskeletal: Normal range of motion.   Skin:     General: Skin is warm and dry.   Neurological:      General: No focal deficit present.      Mental Status: He is alert and oriented to person, place, and time. Mental status is at baseline.   Psychiatric:         Mood and Affect: Mood normal.         Behavior: Behavior normal.         Thought Content: Thought content normal.         Judgment: Judgment normal.         LINES, CATHETERS, DRAINS, AIRWAYS, AND WOUNDS   Lines, Drains, Airways, Wounds:  Peripheral IV 01/01/21 Anterior;Left;Proximal Forearm (Active)   Number of days: 6       Comments:      LABS / IMAGING / TELE      Labs  I have reviewed the patient's labs to the time of note. No new clinical concern.  Ferritin 1861, , AST 29, ALT 71    No results found for: COVID19    Imaging  Not applicable    ECG/Telemetry  I have independently reviewed the telemetry. No events for the last 24 hours.    ASSESSMENT AND PLAN      Acute respiratory failure with hypoxia (CMS/HCC)  Assessment & Plan  Due to COVID-19  Prone protocol  Chest x-ray with progressive patchy airspace opacities  Pulmonary, infectious disease following  Attempted home oxygen screen yesterday but quickly desaturated with activity. I will try another home O2 screen tomorrow.    Pancytopenia (CMS/HCC)  Assessment & Plan  Likely related to acute infection  Monitor CBC w/daily labs  Consult heme/onc, signed off now that numbers are improving  Improving daily    Coronary artery disease involving native coronary artery of native heart without angina pectoris  Assessment & Plan  Chronic, asymptomatic  Continue Lipitor, ASA    * Pneumonia due to COVID-19 virus  Assessment & Plan  Covid pneumonia with acute hypoxic respiratory failure  Chest x-ray with progressive patchy airspace opacities noted bilaterally  Not a candidate for remdesivir as symptoms started 13 days prior to admission  Decadron transitioned to the high dose protocol, completed 20 mg x5 days, currently getting 10 mg x5 days  Cultures no growth to 72 hours  Empiric antibiotics for suspected secondary bacterial infection  Rocephin transitioned to Ceftin to complete the course  Continue prone protocol  Trend inflammatory markers       VTE Assessment: Padua VTE Score: 1  VTE Prophylaxis Plan: Lovenox SQ  Code Status: Full Code  Estimated Discharge Date: 1/8/2021   Disposition Planning: Repeat home O2  nader tomorrow with possible discharge home pending results.     Salinas Villalta,   1/7/2021

## 2021-01-07 NOTE — PROGRESS NOTES
Pulmonary Progress Note      Subjective:  Patient seen and examined. Looking forward to his walk test tomorrow. Reports SOB greatly improved. Able to speak in lengthy sentences without dyspnea. Adequate saturation on 1LNC.    Allergies: Patient has no known allergies.    Medications:  Current Facility-Administered Medications   Medication Dose Route Frequency Provider Last Rate Last Admin   • acetaminophen (TYLENOL) tablet 650 mg  650 mg oral q4h PRN Debbie Whitfield MD   650 mg at 12/25/20 2130    Or   • acetaminophen (TYLENOL) suppository 650 mg  650 mg rectal q4h PRN Debbie Whitfield MD        Or   • acetaminophen (TYLENOL) 650 mg/20.3 mL solution 650 mg  650 mg oral q4h PRN Debbie Whitfield MD       • albuterol HFA (VENTOLIN HFA) 90 mcg/actuation inhaler 2 puff  2 puff inhalation q6h PRN Debbie Whitfield MD       • aspirin enteric coated tablet 81 mg  81 mg oral Daily Debbie Whitfield MD   81 mg at 01/07/21 0906   • atorvastatin (LIPITOR) tablet 10 mg  10 mg oral Daily (6p) Debbie Whitfield MD   10 mg at 01/07/21 1701   • glucose chewable tablet 16-32 g of dextrose  16-32 g of dextrose oral PRN Debbie Whitfield MD        Or   • dextrose 40 % oral gel 15-30 g of dextrose  15-30 g of dextrose oral PRN Debbie Whitfield MD        Or   • glucagon (GLUCAGEN) injection 1 mg  1 mg intramuscular PRN Debbie Whitfield MD        Or   • dextrose in water injection 12.5 g  25 mL intravenous PRN Debbie Whitfield MD       • enoxaparin (LOVENOX) syringe 40 mg  0.5 mg/kg subcutaneous q12h (6a, 6p) Salinas Villalta DO   40 mg at 01/07/21 1701   • melatonin capsule 5 mg  5 mg oral Nightly PRN Debbie Whitfield MD   5 mg at 01/06/21 2245   • morphine injection 1 mg  1 mg intravenous q3h PRN Reagan Haque MD       • multivitamin tablet 1 tablet  1 tablet oral Daily Debbie Whitfield MD   1 tablet at 01/07/21 0906   • ondansetron (ZOFRAN) injection 4 mg  4 mg intravenous q8h PRN Debbie Whitfield MD          Review of Systems:  Unchanged except  as noted in HPI    Vital signs in last 24 hours:  Temp:  [36.4 °C (97.5 °F)-36.8 °C (98.2 °F)] 36.7 °C (98 °F)  Heart Rate:  [50-63] 54  Resp:  [16-18] 16  BP: (108-147)/(60-76) 119/62    Ventilator/Respiratory:       Input/Ouput in Last 24 hours:  No intake or output data in the 24 hours ending 01/07/21 8528    Physical Exam  General: Elderly male, NAD, comfortable on 1 liter nasal cannula  HEENT: Moist membranes, PERRL   Neck: Supple, no JVD, no tug or stridor, trach midline  Cardiac: RRR, +S1/S2, no murmur appreciated  Lungs: Normal effort, not in any acute distress  Diminished breath sounds, greatest at bases  Abdomen: Soft, NT/ND, +BS, no rebound/guarding   : Deferred  Extremities: No edema, clubbing or cyanosis  Musculoskeletal: No joint deformity  Vascular: No ischemia noted  Neuro: AAOx3, nonfocal  Skin: Warm, limited exam, defer to attending/nursing  Psych: Cooperative, appropriate, frustrated    Labs:    Lab Results   Component Value Date    WBC 5.68 01/07/2021    HGB 12.3 (L) 01/07/2021    HCT 36.4 (L) 01/07/2021     01/07/2021    ALT 71 (H) 01/07/2021    AST 29 01/07/2021     (L) 01/07/2021    K 4.7 01/07/2021     01/07/2021    CREATININE 0.5 (L) 01/07/2021    BUN 22 (H) 01/07/2021    CO2 24 01/07/2021    INR 1.1 12/25/2020     Imaging:  No new chest imaging to review.    ECG/Telemetry: Sinus rhythm    IMPRESSION AND PLAN    Acute hypoxic respiratory failure, slowly improving  Not O2 dependent at baseline. Oxygen requirements since secondary to COVID infection   - Weaned to 1 liter low flow nasal cannula today.  - Continue supplemental O2 to maintain SpO2 >= 90%  -- Wean as condition permits.  - Incentive spirometry encouraged.  - Modified proning as tolerated.  - Home O2 eval completed 1/6.  -- SpO2 88% at rest, 84% with ambulation on room air.   - Persistent desaturations likely due to extensive multifocal airspace disease and prolonged hospitalization.   -- Will retest tomorrow.  Anticipate will need oxygen at discharge  - Low suspicion for DVT/PE given adequate VTE prophylaxis.  - Repeat ABG and CXR prn.  - Outpatient follow up w/ pulmonary at discharge.      Coronavirus (COVID-19) infection  PCR positive on 12/14.  - Maintain contact and droplet precautions  - Monitor inflammatory markers intermittently  - Completed 20mg IV Decadron qDay 1/3/21.  -- Decreased to 10mg qDay 1/3. Day #5/5 of therapy.  - ID following. Supportive care  - DVT ppx w/ LMWH 0.5mg/kg BID   - Albuterol HFA PRN   - Repeat chest x-ray PRN.  - Outpatient follow up imaging in 6-8 weeks to ensure resolution of infiltrates.     Former Smoker  30 pack year history. Quit 26 years ago     -- Additional history per EMR/attending.     Diet: Regular consistency, 2gm potassium  DVT prophylaxis: sq Lovenox 40mg BID  Code status: Full code  Case d/w: patient, DEJAH Galvez, Dr. Mccarty

## 2021-01-07 NOTE — PROGRESS NOTES
Brief Nutrition Assessment Reason for consult:  F/U    Nutrition Interventions/Recommendations:   1. Continue with regular diet as tolerated  2. Weekly weights  3. Continue daily multivitamin   4. Supplements if po <50%    Monitor:  1. % PO intake  2. Diet tolerance  3. Weight changes  4. Labs-replete prn  5. Skin integrity  6. Bowel function    Goals:         1. To consume and tolerate >/= 75% of estimated needs via goal diet and supplements      Clinical Course: Patient is a 65 y.o. male who was admitted on 12/24/2020 with a diagnosis of Shortness of breath [R06.02]  Hypoxia [R09.02]  COVID-19 virus infection [U07.1]  Pneumonia due to COVID-19 virus [U07.1, J12.82].   65 yr old male admitted with covid, completed decadron.  Chest x-ray with progressive patchy airspace opacities. ON 2 L O2.Pt wants to go home.  Discussed at rounds, patient did not answer phone.  Good appetite.      Nutrition Focused Physical Exam: Per [facility] COVID pt visitation restrictions, RD not able to enter room.   BMI 24.96 WDL.         Dietary Orders   (From admission, onward)             Start     Ordered    01/06/21 1055  Adult Diet Regular; RD/LDN may adjust order  Diet effective now     Question Answer Comment   Diet Texture Regular    Delegation of Authority. Diet orders written by PA/CRGilberto may not be adjusted by RD/LDNs. RD/LDN may adjust order        01/06/21 1054              Weights (last 7 days)     Date/Time   Weight    01/07/21 1100   76.7 kg (169 lb 1.5 oz)    Weight: per nsg at 01/07/21 1100    12/31/20 1000   76.7 kg (169 lb 1.5 oz)    Weight: per nsg at 12/31/20 1000            Wt Readings from Last 3 Encounters:   01/07/21 76.7 kg (169 lb 1.5 oz)   04/13/18 73 kg (161 lb)       Reason for Assessment  Reason For Assessment: diagnosis/disease state(covid)  Patient Already Seen On: 01/07/21  Diagnosis: other (see comments)(sob, covid)    MST Nutrition Screen Tool  Has patient lost weight without trying?: 0-->No  If yes,how much  "weight has been lost?: 0-->Patient has not lost weight  Has patient been eating poorly due to decreased appetite?: 0-->No  MST Nutrition Screen Score: 0    Nutrition/Diet History  Intake (%): 100%  Food Allergies: other (see comments)(nkfa)    Physical Findings  Overall Physical Appearance: other (see comments)(unable to assess due to covid)  Last Bowel Movement: 01/06/21  Skin: other (see comments)(intact. no edema per nsg)    Last Bowel Movement: 01/06/21    Nutrition Order  Nutrition Order Review: meets nutritional requirements  Nutrition Order Comments: regular    Anthropometrics  Height: 175.3 cm (5' 9.02\")(per emr)           Current Weight  Weight Method: Bed scale  Weight: 76.7 kg (169 lb 1.5 oz)(per nsg)    Ideal Body Weight (IBW)  Ideal Body Weight (IBW) (kg): 73.73  % Ideal Body Weight: 104.03    Usual Body Weight (UBW)  Usual Body Weight: 73 kg (161 lb)(per emr april 2018)  % Usual Body Weight: 105.03  % of Usual Body Weight Assessment: not applicable  Weight Loss: unintentional  Time Frame: >1 Year  Body Mass Index (BMI)  BMI (Calculated): 25  BMI (kg/m2): 25.01  BMI Assessment: BMI 25-29.9: overweight                        Labs/Procedures/Meds  Lab Results Reviewed: reviewed, pertinent  Lab Results Comments: Na 135,     Results from last 7 days   Lab Units 01/07/21  0400 01/06/21  0532 01/05/21  0613   SODIUM mEQ/L 135* 135* 136   POTASSIUM mEQ/L 4.7 4.5 4.5   CHLORIDE mEQ/L 102 102 104   CO2 mEQ/L 24 25 24   BUN mg/dL 22* 25* 22*   CREATININE mg/dL 0.5* 0.7* 0.5*   GLUCOSE mg/dL 104* 99 96   CALCIUM mg/dL 8.4* 8.4* 8.4*      Results from last 7 days   Lab Units 01/07/21  0400 01/06/21  0532 01/05/21  0613   ALK PHOS IU/L 47 47 46   BILIRUBIN TOTAL mg/dL 1.1 1.1 1.2   ALBUMIN g/dL 2.6* 2.5* 2.5*   ALT IU/L 71* 71* 81*   AST IU/L 29 25 31          No results found for: HGBA1C  No results found for: [id number]  Lab Results   Component Value Date    CALCIUM 8.4 (L) 01/07/2021    PHOS 2.6 12/25/2020 "     Results from last 7 days   Lab Units 01/07/21  0400 01/06/21  0532 01/05/21  0613   WBC K/uL 5.68 6.36 6.23   HEMOGLOBIN g/dL 12.3* 12.0* 11.9*   HEMATOCRIT % 36.4* 35.2* 34.6*   PLATELETS K/uL 284 286 184                      Diagnostic Tests/Procedures  Diagnostic Test/Procedure Reviewed: reviewed, pertinent  Diagnostic Test/Procedures Comments: covid    Medications  Pertinent Medications Reviewed: reviewed, pertinent  Pertinent Medications Comments: asa, lovenox, lipitor, mvi  • aspirin  81 mg oral Daily   • atorvastatin  10 mg oral Daily (6p)   • enoxaparin  0.5 mg/kg subcutaneous q12h (6a, 6p)   • multivitamin  1 tablet oral Daily         Nutritional Needs Met?: Yes(po 100%)  Nutrition Status Classification: Mild nutritional compromise(L1: covid 2 pts)    Date: 01/07/21  Signature: Rosanna Xiong, ADAM, LDN

## 2021-01-07 NOTE — PLAN OF CARE
Problem: Adult Inpatient Plan of Care  Goal: Plan of Care Review  Flowsheets (Taken 1/7/2021 8089)  Progress: improving  Outcome Summary: Pt discussed in Care Progression Rounds. Attempted to call pt's room but unable to reach pt. TC to pt's cell and VM left. CC will continue to follow.

## 2021-01-07 NOTE — PLAN OF CARE
Problem: Adult Inpatient Plan of Care  Goal: Plan of Care Review  1/7/2021 1552 by Shahnaz Schmitt BSN  Flowsheets (Taken 1/7/2021 1552)  Progress: improving  Plan of Care Reviewed With: patient  Outcome Summary: Pt discussed in Care Progression Rounds. TC to patient to discuss discharge planning. Discussed HC with patient and pt declined services again. Discussed Home O2 Eval and possibility of needing O2. Pt receptive. Explained process to pt re ordering O2 if he needs O2. Discussed IMM letter and verbal consent obtained. Pt declined copy. CC will continue to follow.

## 2021-01-08 ENCOUNTER — APPOINTMENT (INPATIENT)
Dept: RADIOLOGY | Facility: HOSPITAL | Age: 66
DRG: 177 | End: 2021-01-08
Attending: PHYSICIAN ASSISTANT
Payer: MEDICARE

## 2021-01-08 LAB
ALBUMIN SERPL-MCNC: 2.4 G/DL (ref 3.4–5)
ALP SERPL-CCNC: 45 IU/L (ref 35–126)
ALT SERPL-CCNC: 59 IU/L (ref 16–63)
ANION GAP SERPL CALC-SCNC: 5 MEQ/L (ref 3–15)
AST SERPL-CCNC: 25 IU/L (ref 15–41)
BASOPHILS # BLD: 0.01 K/UL (ref 0.01–0.1)
BASOPHILS NFR BLD: 0.2 %
BILIRUB SERPL-MCNC: 0.9 MG/DL (ref 0.3–1.2)
BUN SERPL-MCNC: 24 MG/DL (ref 8–20)
CALCIUM SERPL-MCNC: 8.1 MG/DL (ref 8.9–10.3)
CHLORIDE SERPL-SCNC: 104 MEQ/L (ref 98–109)
CO2 SERPL-SCNC: 24 MEQ/L (ref 22–32)
CREAT SERPL-MCNC: 0.6 MG/DL (ref 0.8–1.3)
DIFFERENTIAL METHOD BLD: ABNORMAL
EOSINOPHIL # BLD: 0 K/UL (ref 0.04–0.54)
EOSINOPHIL NFR BLD: 0 %
ERYTHROCYTE [DISTWIDTH] IN BLOOD BY AUTOMATED COUNT: 13.2 % (ref 11.6–14.4)
ERYTHROCYTE [DISTWIDTH] IN BLOOD BY AUTOMATED COUNT: 13.4 % (ref 11.6–14.4)
GFR SERPL CREATININE-BSD FRML MDRD: >60 ML/MIN/1.73M*2
GLUCOSE SERPL-MCNC: 116 MG/DL (ref 70–99)
HCT VFR BLDCO AUTO: 33.1 % (ref 40.1–51)
HCT VFR BLDCO AUTO: 33.3 % (ref 40.1–51)
HGB BLD-MCNC: 11.4 G/DL (ref 13.7–17.5)
HGB BLD-MCNC: 11.4 G/DL (ref 13.7–17.5)
IMM GRANULOCYTES # BLD AUTO: 0.03 K/UL (ref 0–0.08)
IMM GRANULOCYTES NFR BLD AUTO: 0.6 %
LYMPHOCYTES # BLD: 0.7 K/UL (ref 1.2–3.5)
LYMPHOCYTES NFR BLD: 14.2 %
MCH RBC QN AUTO: 30.2 PG (ref 28–33.2)
MCH RBC QN AUTO: 30.4 PG (ref 28–33.2)
MCHC RBC AUTO-ENTMCNC: 34.2 G/DL (ref 32.2–36.5)
MCHC RBC AUTO-ENTMCNC: 34.4 G/DL (ref 32.2–36.5)
MCV RBC AUTO: 88.1 FL (ref 83–98)
MCV RBC AUTO: 88.3 FL (ref 83–98)
MONOCYTES # BLD: 0.18 K/UL (ref 0.3–1)
MONOCYTES NFR BLD: 3.6 %
NEUTROPHILS # BLD: 4.02 K/UL (ref 1.7–7)
NEUTS SEG NFR BLD: 81.4 %
NRBC BLD-RTO: 0 %
PDW BLD AUTO: 10.8 FL (ref 9.4–12.4)
PDW BLD AUTO: 9.4 FL (ref 9.4–12.4)
PLATELET # BLD AUTO: 196 K/UL (ref 150–350)
PLATELET # BLD AUTO: 265 K/UL (ref 150–350)
POTASSIUM SERPL-SCNC: 4.4 MEQ/L (ref 3.6–5.1)
PROT SERPL-MCNC: 5.5 G/DL (ref 6–8.2)
RBC # BLD AUTO: 3.75 M/UL (ref 4.5–5.8)
RBC # BLD AUTO: 3.78 M/UL (ref 4.5–5.8)
SODIUM SERPL-SCNC: 133 MEQ/L (ref 136–144)
WBC # BLD AUTO: 4.94 K/UL (ref 3.8–10.5)
WBC # BLD AUTO: 7.4 K/UL (ref 3.8–10.5)

## 2021-01-08 PROCEDURE — 85027 COMPLETE CBC AUTOMATED: CPT | Performed by: INTERNAL MEDICINE

## 2021-01-08 PROCEDURE — 36415 COLL VENOUS BLD VENIPUNCTURE: CPT | Performed by: HOSPITALIST

## 2021-01-08 PROCEDURE — 85730 THROMBOPLASTIN TIME PARTIAL: CPT | Performed by: INTERNAL MEDICINE

## 2021-01-08 PROCEDURE — 63600000 HC DRUGS/DETAIL CODE: Performed by: INTERNAL MEDICINE

## 2021-01-08 PROCEDURE — 63600105 HC IODINE BASED CONTRAST: Performed by: PHYSICIAN ASSISTANT

## 2021-01-08 PROCEDURE — 94761 N-INVAS EAR/PLS OXIMETRY MLT: CPT

## 2021-01-08 PROCEDURE — 20600000 HC ROOM AND CARE INTERMEDIATE/TELEMETRY

## 2021-01-08 PROCEDURE — G1004 CDSM NDSC: HCPCS

## 2021-01-08 PROCEDURE — 63700000 HC SELF-ADMINISTRABLE DRUG: Performed by: HOSPITALIST

## 2021-01-08 PROCEDURE — 85025 COMPLETE CBC W/AUTO DIFF WBC: CPT | Performed by: HOSPITALIST

## 2021-01-08 PROCEDURE — 99233 SBSQ HOSP IP/OBS HIGH 50: CPT | Mod: CR | Performed by: INTERNAL MEDICINE

## 2021-01-08 PROCEDURE — 85610 PROTHROMBIN TIME: CPT | Performed by: INTERNAL MEDICINE

## 2021-01-08 PROCEDURE — 82040 ASSAY OF SERUM ALBUMIN: CPT | Performed by: HOSPITALIST

## 2021-01-08 RX ORDER — HEPARIN SODIUM 10000 [USP'U]/100ML
0-4000 INJECTION, SOLUTION INTRAVENOUS
Status: DISPENSED | OUTPATIENT
Start: 2021-01-08 | End: 2021-01-10

## 2021-01-08 RX ADMIN — ENOXAPARIN SODIUM 40 MG: 40 INJECTION SUBCUTANEOUS at 06:25

## 2021-01-08 RX ADMIN — ASPIRIN 81 MG: 81 TABLET, COATED ORAL at 08:11

## 2021-01-08 RX ADMIN — ATORVASTATIN CALCIUM 10 MG: 10 TABLET, FILM COATED ORAL at 17:52

## 2021-01-08 RX ADMIN — IOHEXOL 100 ML: 350 INJECTION, SOLUTION INTRAVENOUS at 21:45

## 2021-01-08 RX ADMIN — THERA TABS 1 TABLET: TAB at 08:12

## 2021-01-08 RX ADMIN — ENOXAPARIN SODIUM 40 MG: 40 INJECTION SUBCUTANEOUS at 17:52

## 2021-01-08 NOTE — PLAN OF CARE
Plan of Care Review  Outcome Summary: patient tolerated dinner. denies pain. encouraged to get oob to chair. new iv started for CT

## 2021-01-08 NOTE — PROGRESS NOTES
Pulmonary Progress Note      Subjective:  Resting in bed. Comfortable on 1LNC.  Offers no new complaints. Feeling better overall.  Dyspnea improving. Coughing less.  Anxious for discharge.  Denies any fevers, chills, chest congestion.  Chart/labs/vitals reviewed.    Allergies: Patient has no known allergies.    Medications:  Current Facility-Administered Medications   Medication Dose Route Frequency Provider Last Rate Last Admin   • acetaminophen (TYLENOL) tablet 650 mg  650 mg oral q4h PRN Debbie Whitfield MD   650 mg at 12/25/20 2130    Or   • acetaminophen (TYLENOL) suppository 650 mg  650 mg rectal q4h PRN Debbie Whitfield MD        Or   • acetaminophen (TYLENOL) 650 mg/20.3 mL solution 650 mg  650 mg oral q4h PRN Debbie Whitfield MD       • albuterol HFA (VENTOLIN HFA) 90 mcg/actuation inhaler 2 puff  2 puff inhalation q6h PRN Debbie Whitfield MD       • aspirin enteric coated tablet 81 mg  81 mg oral Daily Debbie Whitfield MD   81 mg at 01/08/21 0811   • atorvastatin (LIPITOR) tablet 10 mg  10 mg oral Daily (6p) Debbie Whitfield MD   10 mg at 01/07/21 1701   • glucose chewable tablet 16-32 g of dextrose  16-32 g of dextrose oral PRN Debbie Whitfield MD        Or   • dextrose 40 % oral gel 15-30 g of dextrose  15-30 g of dextrose oral PRN Debbie Whitfield MD        Or   • glucagon (GLUCAGEN) injection 1 mg  1 mg intramuscular PRN Debbie Whitfield MD        Or   • dextrose in water injection 12.5 g  25 mL intravenous PRN Debbie Whitfield MD       • enoxaparin (LOVENOX) syringe 40 mg  0.5 mg/kg subcutaneous q12h (6a, 6p) Salinas Villalta DO   40 mg at 01/08/21 0625   • melatonin capsule 5 mg  5 mg oral Nightly PRN Debbie Whitfield MD   5 mg at 01/07/21 2326   • morphine injection 1 mg  1 mg intravenous q3h PRN Reagan Haque MD       • multivitamin tablet 1 tablet  1 tablet oral Daily Debbie Whitfield MD   1 tablet at 01/08/21 0812   • ondansetron (ZOFRAN) injection 4 mg  4 mg intravenous q8h PRN Debbie Whitfield MD           Review of Systems:  Unchanged except as noted in HPI    Vital signs in last 24 hours:  Temp:  [36.4 °C (97.5 °F)-36.7 °C (98 °F)] 36.6 °C (97.9 °F)  Heart Rate:  [43-88] 43  Resp:  [16-18] 16  BP: (109-119)/(62-72) 118/62    Input/Ouput in Last 24 hours:    Intake/Output Summary (Last 24 hours) at 1/8/2021 1145  Last data filed at 1/8/2021 0600  Gross per 24 hour   Intake --   Output 600 ml   Net -600 ml     Physical Exam  General: Elderly male, NAD, requiring 1 liter nasal cannula  HEENT: Moist membranes, PERRL   Neck: Supple, no JVD, no tug or stridor, trach midline  Cardiac: RRR, +S1/S2, no murmur appreciated  Lungs: Normal effort, not in any acute distress  Decreased breath sounds, greatest at bases  Fine bibasilar inspiratory rales  No wheeze/rhonchi  Abdomen: Soft, NT/ND, +BS, no rebound/guarding   : Deferred  Extremities: No edema, clubbing or cyanosis  Musculoskeletal: No joint deformity  Vascular: No ischemia noted  Neuro: AAOx3, nonfocal  Skin: Warm, limited exam, defer to attending/nursing  Psych: Cooperative, appropriate     Labs:    Lab Results   Component Value Date    WBC 4.94 01/08/2021    HGB 11.4 (L) 01/08/2021    HCT 33.3 (L) 01/08/2021     01/08/2021    ALT 59 01/08/2021    AST 25 01/08/2021     (L) 01/08/2021    K 4.4 01/08/2021     01/08/2021    CREATININE 0.6 (L) 01/08/2021    BUN 24 (H) 01/08/2021    CO2 24 01/08/2021    INR 1.1 12/25/2020     Imaging:  No new chest imaging to review.    ECG/Telemetry: SR @ 66    IMPRESSION AND PLAN    Acute hypoxic respiratory failure, slowly improving  Not O2 dependent at baseline. Oxygen requirements since secondary to COVID infection   - Weaned to 1 liter low flow nasal cannula today.  - Continue supplemental O2 to maintain SpO2 >= 90%  -- Wean as condition permits.  - Incentive spirometry encouraged.  - Modified proning as tolerated.  - Home O2 evaluation ordered for today.  - Repeat ABG and CXR prn.     Coronavirus  (COVID-19) infection  PCR positive on 12/14.  - Maintain contact and droplet precautions  - Monitor inflammatory markers intermittently  - Completed 10 day course of high dose IV Decadron.   -- No indication for additional steroids at discharge.  - ID following. Supportive care  - DVT ppx w/ LMWH 0.5mg/kg BID   - Albuterol HFA PRN   - Repeat chest x-ray PRN.  - Recommend outpatient follow up CT chest in 6-8 weeks to ensure resolution of infiltrates.  - Outpatient follow up with pulmonary at discharge.  -- Office contact information will be provided.     Former Smoker  30 pack year history. Quit 26 years ago     -- Additional history per EMR/attending.     Diet: Regular consistency, 2gm potassium  DVT prophylaxis: sq Lovenox 40mg BID  Code status: Full code  Case d/w: patient, Dr. Villalta

## 2021-01-08 NOTE — NURSING NOTE
Home Oxygen Qualification Protocol    Findings:  Room Air Spo2 at rest: 89 %(just sitting on edge of bed went to 82%)  Room Air SpO2 % with ambulation: (unable to perform)  SPO2 % ambulation with oxygen: (unable, pt had to sit for 10 minutes for sats to increase)    Conclusion:  Meets criteria for home oxygen: Yes   pt was resting in bed on 2lnc -sats 92%. Kept pt in bed and turned oxygen off sats 89-90%.  Had pt sit on edge of bed and sats dropped quickly to 82% placed pt on oxgen, kept increasing the flow to 10l and it took about 10 min for sats to return to 89-90.  Had pt lay back in bed and sats improved to 93-95% and was able to decrease oxygen to  3lnc.  Left pt on 3lnc.    Oxygen Recommendation:  Recommended lpm at rest: 3 lpm  Recommended lpm with ambulation: (unable to walk, pt desatd to mid 80s and had to sit down)       The information above was collected from the most recent home oxygen qualification protocol performed on the patient.

## 2021-01-08 NOTE — PROGRESS NOTES
Hospital Medicine Service -  Daily Progress Note       SUBJECTIVE   Interval History: Feels well and really wants to go home. I told him that we will have to see what happens with the home oxygen screen.     OBJECTIVE      Vital signs in last 24 hours:  Temp:  [36.4 °C (97.5 °F)-36.7 °C (98 °F)] 36.6 °C (97.9 °F)  Heart Rate:  [43-88] 52  Resp:  [16-18] 18  BP: (106-119)/(62-72) 106/65    Intake/Output Summary (Last 24 hours) at 1/8/2021 1205  Last data filed at 1/8/2021 0600  Gross per 24 hour   Intake --   Output 600 ml   Net -600 ml       PHYSICAL EXAMINATION      Physical Exam  Vitals signs and nursing note reviewed.   Constitutional:       Appearance: Normal appearance. He is well-developed.   HENT:      Head: Normocephalic and atraumatic.      Mouth/Throat:      Mouth: Mucous membranes are moist.      Pharynx: Oropharynx is clear.   Eyes:      Extraocular Movements: Extraocular movements intact.      Pupils: Pupils are equal, round, and reactive to light.   Neck:      Musculoskeletal: Normal range of motion and neck supple.   Cardiovascular:      Rate and Rhythm: Normal rate and regular rhythm.      Pulses: Normal pulses.      Heart sounds: Normal heart sounds.   Pulmonary:      Effort: Pulmonary effort is normal.      Breath sounds: Normal breath sounds.   Abdominal:      General: Abdomen is flat. Bowel sounds are normal.      Palpations: Abdomen is soft.   Musculoskeletal: Normal range of motion.   Skin:     General: Skin is warm and dry.   Neurological:      General: No focal deficit present.      Mental Status: He is alert and oriented to person, place, and time. Mental status is at baseline.   Psychiatric:         Mood and Affect: Mood normal.         Behavior: Behavior normal.         Thought Content: Thought content normal.         Judgment: Judgment normal.        LINES, CATHETERS, DRAINS, AIRWAYS, AND WOUNDS   Lines, Drains, Airways, Wounds:  Peripheral IV 01/01/21 Anterior;Left;Proximal Forearm  (Active)   Number of days: 7       Comments:      LABS / IMAGING / TELE      Labs  I have reviewed the patient's pertinent labs.  Significant abnormals are Na 133.    No results found for: COVID19    Imaging  Not applicable    ECG/Telemetry  I have independently reviewed the telemetry. No events for the last 24 hours.    ASSESSMENT AND PLAN      Acute respiratory failure with hypoxia (CMS/HCC)  Assessment & Plan  Due to COVID-19  Prone protocol  Chest x-ray with progressive patchy airspace opacities  Pulmonary, infectious disease following  Minimal oxygen requirements at rest but drops down to mid 80's with activity even with supplemental oxygen  Continue to supplement as needed  Continue PT/OT    Pancytopenia (CMS/HCC)  Assessment & Plan  Likely related to acute infection  Monitor CBC w/daily labs  Consult heme/onc, signed off now that numbers are improving  Improving daily    Coronary artery disease involving native coronary artery of native heart without angina pectoris  Assessment & Plan  Chronic, asymptomatic  Continue Lipitor, ASA    * Pneumonia due to COVID-19 virus  Assessment & Plan  Covid pneumonia with acute hypoxic respiratory failure  Chest x-ray with progressive patchy airspace opacities noted bilaterally  Not a candidate for remdesivir as symptoms started 13 days prior to admission  Decadron transitioned to the high dose protocol, completed 20 mg x5 days, currently getting 10 mg x5 days  Cultures no growth to 72 hours  Empiric antibiotics for suspected secondary bacterial infection  Rocephin transitioned to Ceftin to complete the course  Continue prone protocol  Stop trending inflammatory markers  Notified by Iinfection control that it would be OK to remove isolation precautions on hospital day 14       VTE Assessment: [place] VTE Score: 1  VTE Prophylaxis Plan: Lovenox SQ  Code Status: Full Code  Estimated Discharge Date: 1/11/2021   Disposition Planning: Continue to wean oxygen as tolerated     Salinas  DO Pawan  1/8/2021

## 2021-01-09 PROBLEM — I26.99 ACUTE PULMONARY EMBOLISM (CMS/HCC): Status: ACTIVE | Noted: 2021-01-09

## 2021-01-09 LAB
ALBUMIN SERPL-MCNC: 2.2 G/DL (ref 3.4–5)
ALP SERPL-CCNC: 41 IU/L (ref 35–126)
ALT SERPL-CCNC: 52 IU/L (ref 16–63)
ANION GAP SERPL CALC-SCNC: 5 MEQ/L (ref 3–15)
APTT PPP: 115 SEC (ref 23–35)
APTT PPP: 193 SEC (ref 23–35)
APTT PPP: 32 SEC (ref 23–35)
APTT PPP: 66 SEC (ref 23–35)
APTT PPP: >230 SEC (ref 23–35)
AST SERPL-CCNC: 21 IU/L (ref 15–41)
BASOPHILS # BLD: 0 K/UL (ref 0.01–0.1)
BASOPHILS NFR BLD: 0 %
BILIRUB SERPL-MCNC: 1.1 MG/DL (ref 0.3–1.2)
BUN SERPL-MCNC: 25 MG/DL (ref 8–20)
CALCIUM SERPL-MCNC: 7.9 MG/DL (ref 8.9–10.3)
CHLORIDE SERPL-SCNC: 103 MEQ/L (ref 98–109)
CO2 SERPL-SCNC: 25 MEQ/L (ref 22–32)
CREAT SERPL-MCNC: 0.7 MG/DL (ref 0.8–1.3)
DIFFERENTIAL METHOD BLD: ABNORMAL
EOSINOPHIL # BLD: 0.09 K/UL (ref 0.04–0.54)
EOSINOPHIL NFR BLD: 1.4 %
ERYTHROCYTE [DISTWIDTH] IN BLOOD BY AUTOMATED COUNT: 13.6 % (ref 11.6–14.4)
GFR SERPL CREATININE-BSD FRML MDRD: >60 ML/MIN/1.73M*2
GLUCOSE SERPL-MCNC: 81 MG/DL (ref 70–99)
HCT VFR BLDCO AUTO: 32 % (ref 40.1–51)
HGB BLD-MCNC: 11.1 G/DL (ref 13.7–17.5)
IMM GRANULOCYTES # BLD AUTO: 0.04 K/UL (ref 0–0.08)
IMM GRANULOCYTES NFR BLD AUTO: 0.6 %
INR PPP: 1.1 INR
LYMPHOCYTES # BLD: 1.18 K/UL (ref 1.2–3.5)
LYMPHOCYTES NFR BLD: 17.7 %
MCH RBC QN AUTO: 30.8 PG (ref 28–33.2)
MCHC RBC AUTO-ENTMCNC: 34.7 G/DL (ref 32.2–36.5)
MCV RBC AUTO: 88.9 FL (ref 83–98)
MONOCYTES # BLD: 0.16 K/UL (ref 0.3–1)
MONOCYTES NFR BLD: 2.4 %
NEUTROPHILS # BLD: 5.19 K/UL (ref 1.7–7)
NEUTS SEG NFR BLD: 77.9 %
NRBC BLD-RTO: 0 %
PDW BLD AUTO: 10.1 FL (ref 9.4–12.4)
PLATELET # BLD AUTO: 237 K/UL (ref 150–350)
POTASSIUM SERPL-SCNC: 3.9 MEQ/L (ref 3.6–5.1)
PROT SERPL-MCNC: 5.3 G/DL (ref 6–8.2)
PROTHROMBIN TIME: 14.5 SEC (ref 12.2–14.5)
RBC # BLD AUTO: 3.6 M/UL (ref 4.5–5.8)
SODIUM SERPL-SCNC: 133 MEQ/L (ref 136–144)
TROPONIN I SERPL-MCNC: <0.02 NG/ML
WBC # BLD AUTO: 6.66 K/UL (ref 3.8–10.5)

## 2021-01-09 PROCEDURE — 84484 ASSAY OF TROPONIN QUANT: CPT | Performed by: INTERNAL MEDICINE

## 2021-01-09 PROCEDURE — 20600000 HC ROOM AND CARE INTERMEDIATE/TELEMETRY

## 2021-01-09 PROCEDURE — 63700000 HC SELF-ADMINISTRABLE DRUG: Performed by: HOSPITALIST

## 2021-01-09 PROCEDURE — 99233 SBSQ HOSP IP/OBS HIGH 50: CPT | Mod: CR | Performed by: INTERNAL MEDICINE

## 2021-01-09 PROCEDURE — 80053 COMPREHEN METABOLIC PANEL: CPT | Performed by: HOSPITALIST

## 2021-01-09 PROCEDURE — 85730 THROMBOPLASTIN TIME PARTIAL: CPT | Performed by: INTERNAL MEDICINE

## 2021-01-09 PROCEDURE — 63600000 HC DRUGS/DETAIL CODE: Performed by: INTERNAL MEDICINE

## 2021-01-09 PROCEDURE — 85025 COMPLETE CBC W/AUTO DIFF WBC: CPT | Performed by: HOSPITALIST

## 2021-01-09 PROCEDURE — 63700000 HC SELF-ADMINISTRABLE DRUG: Performed by: INTERNAL MEDICINE

## 2021-01-09 PROCEDURE — 36415 COLL VENOUS BLD VENIPUNCTURE: CPT | Performed by: HOSPITALIST

## 2021-01-09 RX ORDER — APIXABAN 5 MG (74)
KIT ORAL
Qty: 74 TABLET | Refills: 0 | Status: SHIPPED | OUTPATIENT
Start: 2021-01-09 | End: 2021-01-10 | Stop reason: HOSPADM

## 2021-01-09 RX ORDER — SENNOSIDES 8.6 MG/1
2 TABLET ORAL NIGHTLY PRN
Status: DISCONTINUED | OUTPATIENT
Start: 2021-01-09 | End: 2021-01-12 | Stop reason: HOSPADM

## 2021-01-09 RX ADMIN — HEPARIN SODIUM 1500 UNITS/HR: 10000 INJECTION, SOLUTION INTRAVENOUS at 17:18

## 2021-01-09 RX ADMIN — HEPARIN SODIUM 1800 UNITS/HR: 10000 INJECTION, SOLUTION INTRAVENOUS at 00:43

## 2021-01-09 RX ADMIN — Medication 5 MG: at 01:00

## 2021-01-09 RX ADMIN — ASPIRIN 81 MG: 81 TABLET, COATED ORAL at 09:30

## 2021-01-09 RX ADMIN — STANDARDIZED SENNA CONCENTRATE 2 TABLET: 8.6 TABLET ORAL at 23:24

## 2021-01-09 RX ADMIN — THERA TABS 1 TABLET: TAB at 09:30

## 2021-01-09 RX ADMIN — HEPARIN SODIUM 1500 UNITS/HR: 10000 INJECTION, SOLUTION INTRAVENOUS at 13:01

## 2021-01-09 RX ADMIN — Medication 5 MG: at 23:24

## 2021-01-09 RX ADMIN — ATORVASTATIN CALCIUM 10 MG: 10 TABLET, FILM COATED ORAL at 17:22

## 2021-01-09 NOTE — PROGRESS NOTES
Pulmonary Progress Note      Subjective:  Resting in bed. Offers no new acute complaints.  Adequate saturations at rest on low flow nasal cannula.  CTA reviewed. Now on IV Heparin gtt.  Discussed treatment at length.  No acute events noted overnight per chart review.  Denies any fevers, chills, chest congestion.  Chart/labs/vitals reviewed.    Allergies: Patient has no known allergies.    Medications:  Current Facility-Administered Medications   Medication Dose Route Frequency Provider Last Rate Last Admin   • acetaminophen (TYLENOL) tablet 650 mg  650 mg oral q4h PRN Debbie Whitfield MD   650 mg at 12/25/20 2130    Or   • acetaminophen (TYLENOL) suppository 650 mg  650 mg rectal q4h PRN Debbie Whitfield MD        Or   • acetaminophen (TYLENOL) 650 mg/20.3 mL solution 650 mg  650 mg oral q4h PRN Debbie Whitfield MD       • albuterol HFA (VENTOLIN HFA) 90 mcg/actuation inhaler 2 puff  2 puff inhalation q6h PRN Debbie Whitfield MD       • aspirin enteric coated tablet 81 mg  81 mg oral Daily Debbie Whitfield MD   81 mg at 01/09/21 0930   • atorvastatin (LIPITOR) tablet 10 mg  10 mg oral Daily (6p) Debbie Whitfield MD   10 mg at 01/09/21 1722   • glucose chewable tablet 16-32 g of dextrose  16-32 g of dextrose oral PRN Debbie Whitfield MD        Or   • dextrose 40 % oral gel 15-30 g of dextrose  15-30 g of dextrose oral PRN Debbie Whitfield MD        Or   • glucagon (GLUCAGEN) injection 1 mg  1 mg intramuscular PRN Debbie Whitfield MD        Or   • dextrose in water injection 12.5 g  25 mL intravenous PRN Debbie Whitfield MD       • heparin (porcine) bolus from bag 3,050-6,150 Units  40-80 Units/kg intravenous q6h PRN Francy Angel DO       • heparin infusion in 0.45%  units/mL  0-4,000 Units/hr intravenous Titrated Francy Angel DO 15 mL/hr at 01/09/21 1718 1,500 Units/hr at 01/09/21 1718   • melatonin capsule 5 mg  5 mg oral Nightly PRN Debbie Whitfield MD   5 mg at 01/09/21 0100   • morphine injection 1 mg   1 mg intravenous q3h PRN Reagan Haque MD       • multivitamin tablet 1 tablet  1 tablet oral Daily Debbie Whitfield MD   1 tablet at 01/09/21 0930   • ondansetron (ZOFRAN) injection 4 mg  4 mg intravenous q8h PRN Debbie Whitfield MD       • senna (SENOKOT) tablet 2 tablet  2 tablet oral Nightly PRN Salinas Villalta DO          Review of Systems:  Unchanged except as noted in HPI    Vital signs in last 24 hours:  Temp:  [36.4 °C (97.6 °F)-37.2 °C (98.9 °F)] 37.2 °C (98.9 °F)  Heart Rate:  [47-73] 58  Resp:  [16-20] 20  BP: ()/(51-64) 109/59    Input/Ouput in Last 24 hours:    Intake/Output Summary (Last 24 hours) at 1/9/2021 1732  Last data filed at 1/9/2021 0441  Gross per 24 hour   Intake --   Output 900 ml   Net -900 ml     Physical Exam  General: Elderly male, NAD, comfortable on nasal cannula  HEENT: Moist membranes, PERRL   Neck: Supple, no JVD, no tug or stridor, trach midline  Cardiac: RRR, +S1/S2, no murmur appreciated  Lungs: Normal effort, not in any acute distress  Decreased breath sounds, greatest at bases  Rare fine bibasilar rales  No wheeze/rhonchi  Abdomen: Soft, NT/ND, +BS, no rebound/guarding   : Deferred  Extremities: No edema, clubbing or cyanosis  Musculoskeletal: No joint deformity  Vascular: No ischemia noted  Neuro: AAOx3, nonfocal  Skin: Warm, limited exam, defer to attending/nursing  Psych: Cooperative, appropriate    Labs:    Lab Results   Component Value Date    WBC 6.66 01/09/2021    HGB 11.1 (L) 01/09/2021    HCT 32.0 (L) 01/09/2021     01/09/2021    ALT 52 01/09/2021    AST 21 01/09/2021     (L) 01/09/2021    K 3.9 01/09/2021     01/09/2021    CREATININE 0.7 (L) 01/09/2021    BUN 25 (H) 01/09/2021    CO2 25 01/09/2021    INR 1.1 01/08/2021     Imaging:      CTA 1/8/2021  IMPRESSION  1.  Multiple right-sided pulmonary emboli, new from the previous examination. No evidence for right heart strain.  2.  Significantly worsening airspace disease in a pattern  and distribution consistent with Covid 19 pneumonia.  3. Mediastinal and hilar lymphadenopathy, several lymph nodes are slightly decreased in size.     ECG/Telemetry: SR @ 65    IMPRESSION AND PLAN    Acute hypoxic respiratory failure, slowly improving  Not O2 dependent at baseline. Oxygen requirements since secondary to COVID infection   - Weaned to 1 liter low flow nasal cannula today.  - Continue supplemental O2 to maintain SpO2 >= 90%  -- Wean as condition permits.  - Incentive spirometry encouraged.  - Modified proning as tolerated.  - Unable to complete multiple home O2 evaluations to date.  - Repeat home O2 eval prior to discharge.    Pulmonary emboli  CTA with multiple right sided pulmonary emboli. No evidence of RH strain  - Likely provoked in the setting of underlying COVID19 infection.  - Started on IV Heparin gtt.  - Troponin < 0.02, Check BNP  - DVT panel ordered.  - 2D echo ordered.  - Evaluated by hematology/oncology.  -- Agree w/ transitioning to a DOAC if no contraindications.  -- Will likely require 3-6 months of therapy. Duration can be determined at outpatient follow up.      Coronavirus (COVID-19) infection  PCR positive on 12/14.  - Maintain contact and droplet precautions  - Monitor inflammatory markers intermittently  - Completed 10 day course of high dose IV Decadron.   -- No indication for additional steroids at discharge.  - ID following. Supportive care  - Albuterol HFA PRN   - Recommend outpatient follow up CT chest in 6-8 weeks to ensure resolution of infiltrates.  - Outpatient follow up with pulmonary at discharge.  -- Office contact information will be provided.    Mediastinal lymphadenopathy  Demonstrated on CTA chest. Likely reactive in the setting of known COVID19 infection.  - Follow up on outpatient CT chest as per above to ensure resolution/stability.     Former Smoker  30 pack year history. Quit 26 years ago     -- Additional history per EMR/attending.     Diet: Regular  consistency, 2gm potassium  DVT prophylaxis: IV Heparin gtt  Code status: Full code  Case d/w: patient, Dr. Villalta

## 2021-01-09 NOTE — PLAN OF CARE
Problem: Adult Inpatient Plan of Care  Goal: Plan of Care Review  Flowsheets (Taken 1/9/2021 1118)  Outcome Summary: CM was asked to check comparable cost of Eliquis vs Xarelto and prescriptions were sent to he patient's pharmacy already. Called [facility] pharmacy, [phone], and spoke with Mana, pharmacist.  Patient must have a deductible, Eliquis cost is $545.64 and Xarelto cost is $491.00.  [facility] [place].    1413: CM called patient in the hospital room to discuss the above payments.  He requested an email be sent to him, [email or web address], with the numbers.  CM emailed him the information.  CM also advised that he F/U with the pharmacy to see what the copay's may be monthly following the first month, deductible, in the email.  Awaiting response from the patient.    1615: Spoke with the patient regarding the cost of the anticoag medication.  Will F/U with the patient's prescription plan in the AM to see about subsequent copays for the medication.  [facility] [place] aware.

## 2021-01-09 NOTE — SIGNIFICANT EVENT
"[facility] [place]    Called by the RN in charge, Corrine, around 11:00 PM as the patient's CT chest result was reported by radiology as showing \" Multiple right-sided pulmonary emboli, new from the previous examination. No evidence for right heart strain\".    I called the patient in his room on the phone and I discussed with him about this new condition for him. I answered in detail to all his questions.    A/P:    1) New onset R-lung PE    Will start IV Heparin.  Will schedule B/L lower extremities US with Doppler and TTE in AM.  Will consult hem-onc Dr. MINA Dixon for the AC choice for home.   "

## 2021-01-09 NOTE — NURSING NOTE
PT TAKEN DOWN TO CT FOR CT SCAN OF CHEST.  AAOX 4. DENIES PAIN, SOB NOTED DURING MOVEMENTS. CT SCAN OF CHEST RESULT CALLED. PT IS POSITIVE FOR PULMONARY EMBOLISM . GRAHAM CENTENO DO NOTIFIED.  NEW ORDER PLACE FOR CBC,PT/INR, PTT AND  HEPARIN DRIP . DOCTOR CHANELL SPOKE WITH  PT OVER THE PHONE  ABOUT RESULT.  PT IS ANXIOUS @ THIS MOMENT.  LABS OBTAINED AND PTT IS 32 SEC. HEPARIN DRIP STARTED @ 1800 UNIT/HR. NEW IV STARTED IN THE LFA  22 G. WILL CONTINUE TO MONITOR.

## 2021-01-09 NOTE — ASSESSMENT & PLAN NOTE
CT PE on 1/8 found multiple right sided PE's  No evidence of right heart strain, Normal-sized RV. Normal RV systolic function.  Positive acute DVT left peroneal vein extending into the tibioperoneal trunk  Heme/onc consulted, duration of treatment for embolisms caused by COVID still in flux, plan for 3-6 months treatment  Will price check xarelto and eliquis with CM  This would help explain his persistent dyspnea with even mild exertion  Xarelto

## 2021-01-09 NOTE — CONSULTS
[facility]  FOLLOW UP HEMATOLOGY CONSULTATION     Nadeem Morales is a 65 y.o. male previously seen in consultation for cytopenias, now called back at the request of Dr. Villalta for PE.    Primary Care Provider:  Jeimy Pacheco DO    Problem List:  Patient Active Problem List    Diagnosis Date Noted   • Acute pulmonary embolism (CMS/HCC) 01/09/2021   • Acute respiratory failure with hypoxia (CMS/HCC) 12/25/2020   • Coronary artery disease involving native coronary artery of native heart without angina pectoris 12/24/2020   • Pancytopenia (CMS/HCC) 12/24/2020   • Pneumonia due to COVID-19 virus 12/24/2020   • Shortness of breath       HPI: Mr. Morales was initially admitted Dec 24 with acute respiratory failure from Covid pneumonia.   He was seen in consultation by Dr. Narayan on Dec 25 for cytopenias attributed to Covid infection. Our service signed off on Dec 26.  He has been having persistent DOMINGUEZ with minimal exertion.  Chest CT Jan 8 showed PE's. Started on heparin.  He has been on intermediate dose prophylactic Lovenox.    Past Medical History:   Diagnosis Date   • Coronary artery disease     Hx if acute MI, s/p stent placement   • Lipid disorder       Past Surgical History:   Procedure Laterality Date   • HERNIA REPAIR     • TONSILLECTOMY        Family History   Problem Relation Age of Onset   • Lymphoma Biological Mother    • Colon cancer Biological Father         Negative for blood clots    P/S history: smoked 1 - 2 PPD for 30 years, stopped 26 years ago; .    No Known Allergies  Current Facility-Administered Medications   Medication Dose Route Frequency Provider Last Rate Last Admin   • acetaminophen (TYLENOL) tablet 650 mg  650 mg oral q4h PRN Debbie Whitfield MD   650 mg at 12/25/20 2130    Or   • acetaminophen (TYLENOL) suppository 650 mg  650 mg rectal q4h PRN Debbie Whitfield MD        Or   • acetaminophen (TYLENOL) 650 mg/20.3 mL solution 650 mg  650 mg oral  q4h PRN Debbie Whitfield MD       • albuterol HFA (VENTOLIN HFA) 90 mcg/actuation inhaler 2 puff  2 puff inhalation q6h PRN Debbie Whitfield MD       • aspirin enteric coated tablet 81 mg  81 mg oral Daily Debbie Whitfield MD   81 mg at 01/09/21 0930   • atorvastatin (LIPITOR) tablet 10 mg  10 mg oral Daily (6p) Debbie Whitfield MD   10 mg at 01/08/21 1752   • glucose chewable tablet 16-32 g of dextrose  16-32 g of dextrose oral PRN Debbie Whitfield MD        Or   • dextrose 40 % oral gel 15-30 g of dextrose  15-30 g of dextrose oral PRN Debbie Whitfield MD        Or   • glucagon (GLUCAGEN) injection 1 mg  1 mg intramuscular PRN Debbie Whitfield MD        Or   • dextrose in water injection 12.5 g  25 mL intravenous PRN Debbie Whitfield MD       • heparin (porcine) bolus from bag 3,050-6,150 Units  40-80 Units/kg intravenous q6h PRN Francy Angel DO       • heparin infusion in 0.45%  units/mL  0-4,000 Units/hr intravenous Titrated Francy Angel DO 15 mL/hr at 01/09/21 1301 1,500 Units/hr at 01/09/21 1301   • melatonin capsule 5 mg  5 mg oral Nightly PRN Debbie Whitfield MD   5 mg at 01/09/21 0100   • morphine injection 1 mg  1 mg intravenous q3h PRN Reagan Haque MD       • multivitamin tablet 1 tablet  1 tablet oral Daily Debbie Whitfield MD   1 tablet at 01/09/21 0930   • ondansetron (ZOFRAN) injection 4 mg  4 mg intravenous q8h PRN Debbie Whitfield MD         Review of Systems  Constitutional: fatigue  Eye: no visual symptoms  ENT: no mucositis  Lungs: DOMINGUEZ with minimal exertion; no chest pain or cough  Cardiac: no chest pain  Gi: no abdominal pain, nausea, or vomiting  Gu: no dysuria or hematuria  Skeletal: no bone pain  Heme: no adenopathy  Neuro: no focal weakness  Skin: no rash    VITAL SIGNS FOR THIS ENCOUNTER:  BSA: 1.93 meters squared  Visit Vitals  BP (!) 86/51 (BP Location: Right upper arm, Patient Position: Sitting)   Pulse 61   Temp 37.1 °C (98.7 °F) (Oral)   Resp 16   Ht 1.753 m (5'  "9.02\") Comment: per emr   Wt 76.7 kg (169 lb 1.5 oz) Comment: per nsg   SpO2 92%   BMI 24.96 kg/m²     Physical Exam  WD/WN in NAD  Eyes: PERRL  Mouth: no mucositis  Neck: w/o thyromegaly or mass  Lungs: clear to percussion; bibasilar rubs  Cor: S1S2 regular  Abd: soft, nontender w/o mass or organomegaly  Ext: w/o C/C/E  Lymphatic: no cervical/axillary/inguinal nodes  Neuro: no facial asymmetry, speech fluent  Psych: A/A/O, normal affect    Lab Results   Component Value Date    WBC 6.66 01/09/2021    HGB 11.1 (L) 01/09/2021    HCT 32.0 (L) 01/09/2021    MCV 88.9 01/09/2021     01/09/2021     Lab Results   Component Value Date    ALT 52 01/09/2021    AST 21 01/09/2021    ALKPHOS 41 01/09/2021    BILITOT 1.1 01/09/2021     Lab Results   Component Value Date    GLUCOSE 81 01/09/2021    CALCIUM 7.9 (L) 01/09/2021     (L) 01/09/2021    K 3.9 01/09/2021    CO2 25 01/09/2021     01/09/2021    BUN 25 (H) 01/09/2021    CREATININE 0.7 (L) 01/09/2021     Lab Results   Component Value Date    INR 1.1 01/08/2021    INR 1.1 12/25/2020    INR 1.1 12/22/2020     Lab Results   Component Value Date    PTT 66 (H) 01/09/2021     Radiology    Chest CT 1/8/21  1.  Multiple right-sided pulmonary emboli, new from the previous examination.  No evidence for right heart strain.  2.  Significantly worsening airspace disease in a pattern and distribution  consistent with Covid 19 pneumonia.  3. Mediastinal and hilar lymphadenopathy, several lymph nodes are slightly  decreased in size.    Impression  1. Covid pneumonia  2. Covid-related PE despite moderate intensity prophylactic Lovenox    Plan:   I had an extended discussion with Nadeem concerning clinical history and management.  Echocardiogram and venous doppler pending at this time.  Discussed BP with Dr. Villalta.  Continue therapeutic heparin infusion for now. Convert to DOAC when clinically stable.  Anticipate anticoagulation for at least 3 - 6 months.  Anticoagulation " guidelines for Covid-related VTE are evolving.  He will f/u in our office.    Demar Dixon MD

## 2021-01-09 NOTE — PROGRESS NOTES
Hospital Medicine Service -  Daily Progress Note       SUBJECTIVE   Interval History: Pleasant, continues to have shortness of breath even with slight exertion. He expresses understanding about his new diagnosis. Some epistaxis and lip bleeding since being started on heparin     OBJECTIVE      Vital signs in last 24 hours:  Temp:  [36.4 °C (97.6 °F)-36.6 °C (97.9 °F)] 36.4 °C (97.6 °F)  Heart Rate:  [43-72] 47  Resp:  [18] 18  BP: (101-108)/(60-65) 101/62    Intake/Output Summary (Last 24 hours) at 1/9/2021 0834  Last data filed at 1/9/2021 0441  Gross per 24 hour   Intake --   Output 900 ml   Net -900 ml       PHYSICAL EXAMINATION      Physical Exam  Vitals signs and nursing note reviewed.   Constitutional:       Appearance: Normal appearance. He is well-developed.   HENT:      Head: Normocephalic and atraumatic.      Mouth/Throat:      Mouth: Mucous membranes are moist.      Pharynx: Oropharynx is clear.   Eyes:      Extraocular Movements: Extraocular movements intact.      Pupils: Pupils are equal, round, and reactive to light.   Neck:      Musculoskeletal: Normal range of motion and neck supple.   Cardiovascular:      Rate and Rhythm: Normal rate and regular rhythm.      Pulses: Normal pulses.      Heart sounds: Normal heart sounds.   Pulmonary:      Effort: Pulmonary effort is normal.      Breath sounds: Normal breath sounds.   Abdominal:      General: Abdomen is flat. Bowel sounds are normal.      Palpations: Abdomen is soft.   Musculoskeletal: Normal range of motion.   Skin:     General: Skin is warm and dry.   Neurological:      General: No focal deficit present.      Mental Status: He is alert and oriented to person, place, and time. Mental status is at baseline.   Psychiatric:         Mood and Affect: Mood normal.         Behavior: Behavior normal.         Thought Content: Thought content normal.         Judgment: Judgment normal.        LINES, CATHETERS, DRAINS, AIRWAYS, AND WOUNDS   Lines, Drains,  Airways, Wounds:  Peripheral IV 01/01/21 Anterior;Left;Proximal Forearm (Active)   Number of days: 8       Peripheral IV 01/09/21 Anterior;Left Forearm (Active)   Number of days: 0       Comments:      LABS / IMAGING / TELE      Labs  I have reviewed the patient's pertinent labs.  Significant abnormals are Hb 11.1 Na 133.    No results found for: COVID19    Imaging  I have independently reviewed the pertinent imaging from the last 24 hrs. and Significant findings include: CT PE with right sided acute PE    ECG/Telemetry  I have independently reviewed the telemetry. No events for the last 24 hours.    ASSESSMENT AND PLAN      Acute pulmonary embolism (CMS/HCC)  Assessment & Plan  CT PE on 1/8 found multiple right sided PE's  No evidence of right heart strain  TTE pending  Heme/onc consulted  Started on hep gtt  Will price check xarelto and eliquis with CM  This would help explain his persistent dyspnea with even mild exertion    Acute respiratory failure with hypoxia (CMS/HCC)  Assessment & Plan  Due to COVID-19  Prone protocol  Chest x-ray with progressive patchy airspace opacities  Pulmonary, infectious disease following  Minimal oxygen requirements at rest but drops down to mid 80's with activity even with supplemental oxygen  Continue to supplement as needed  Continue PT/OT    Pancytopenia (CMS/HCC)  Assessment & Plan  Likely related to acute infection  Monitor CBC w/daily labs  Consult heme/onc, signed off now that numbers are improving  Improving daily    Coronary artery disease involving native coronary artery of native heart without angina pectoris  Assessment & Plan  Chronic, asymptomatic  Continue Lipitor, ASA    * Pneumonia due to COVID-19 virus  Assessment & Plan  Covid pneumonia with acute hypoxic respiratory failure  Chest x-ray with progressive patchy airspace opacities noted bilaterally  Not a candidate for remdesivir as symptoms started 13 days prior to admission  Decadron transitioned to the high  dose protocol, completed 20 mg x5 days, currently getting 10 mg x5 days  Cultures no growth to 72 hours  Empiric antibiotics for suspected secondary bacterial infection  Rocephin transitioned to Ceftin to complete the course  Continue prone protocol  Stop trending inflammatory markers  Notified by Iinfection control that it would be OK to remove isolation precautions on hospital day 14       VTE Assessment: Padua VTE Score: 1  VTE Prophylaxis Plan: Hep gtt  Code Status: Full Code  Estimated Discharge Date: 1/11/2021   Disposition Planning: Home once his DOMINGUEZ has improved to the point where he can use 6 L or less of oxygen.     Salinas Villalta, DO  1/9/2021

## 2021-01-10 LAB
ALBUMIN SERPL-MCNC: 2.3 G/DL (ref 3.4–5)
ALP SERPL-CCNC: 45 IU/L (ref 35–126)
ALT SERPL-CCNC: 47 IU/L (ref 16–63)
ANION GAP SERPL CALC-SCNC: 5 MEQ/L (ref 3–15)
APTT PPP: 104 SEC (ref 23–35)
APTT PPP: 184 SEC (ref 23–35)
APTT PPP: 59 SEC (ref 23–35)
APTT PPP: 91 SEC (ref 23–35)
AST SERPL-CCNC: 22 IU/L (ref 15–41)
BASOPHILS # BLD: 0 K/UL (ref 0.01–0.1)
BASOPHILS NFR BLD: 0 %
BILIRUB SERPL-MCNC: 1 MG/DL (ref 0.3–1.2)
BNP SERPL-MCNC: 22 PG/ML
BUN SERPL-MCNC: 17 MG/DL (ref 8–20)
CALCIUM SERPL-MCNC: 8.1 MG/DL (ref 8.9–10.3)
CHLORIDE SERPL-SCNC: 103 MEQ/L (ref 98–109)
CO2 SERPL-SCNC: 24 MEQ/L (ref 22–32)
CREAT SERPL-MCNC: 0.6 MG/DL (ref 0.8–1.3)
DIFFERENTIAL METHOD BLD: ABNORMAL
EOSINOPHIL # BLD: 0.1 K/UL (ref 0.04–0.54)
EOSINOPHIL NFR BLD: 2.2 %
ERYTHROCYTE [DISTWIDTH] IN BLOOD BY AUTOMATED COUNT: 13.7 % (ref 11.6–14.4)
GFR SERPL CREATININE-BSD FRML MDRD: >60 ML/MIN/1.73M*2
GLUCOSE SERPL-MCNC: 89 MG/DL (ref 70–99)
HCT VFR BLDCO AUTO: 31.9 % (ref 40.1–51)
HGB BLD-MCNC: 10.9 G/DL (ref 13.7–17.5)
IMM GRANULOCYTES # BLD AUTO: 0.03 K/UL (ref 0–0.08)
IMM GRANULOCYTES NFR BLD AUTO: 0.6 %
LYMPHOCYTES # BLD: 0.83 K/UL (ref 1.2–3.5)
LYMPHOCYTES NFR BLD: 17.9 %
MCH RBC QN AUTO: 30.3 PG (ref 28–33.2)
MCHC RBC AUTO-ENTMCNC: 34.2 G/DL (ref 32.2–36.5)
MCV RBC AUTO: 88.6 FL (ref 83–98)
MONOCYTES # BLD: 0.13 K/UL (ref 0.3–1)
MONOCYTES NFR BLD: 2.8 %
NEUTROPHILS # BLD: 3.54 K/UL (ref 1.7–7)
NEUTS SEG NFR BLD: 76.5 %
NRBC BLD-RTO: 0 %
PDW BLD AUTO: 9.6 FL (ref 9.4–12.4)
PLATELET # BLD AUTO: 218 K/UL (ref 150–350)
POTASSIUM SERPL-SCNC: 4 MEQ/L (ref 3.6–5.1)
PROT SERPL-MCNC: 5.2 G/DL (ref 6–8.2)
RBC # BLD AUTO: 3.6 M/UL (ref 4.5–5.8)
SODIUM SERPL-SCNC: 132 MEQ/L (ref 136–144)
WBC # BLD AUTO: 4.63 K/UL (ref 3.8–10.5)

## 2021-01-10 PROCEDURE — 99233 SBSQ HOSP IP/OBS HIGH 50: CPT | Mod: CR | Performed by: INTERNAL MEDICINE

## 2021-01-10 PROCEDURE — 85730 THROMBOPLASTIN TIME PARTIAL: CPT | Performed by: INTERNAL MEDICINE

## 2021-01-10 PROCEDURE — 63700000 HC SELF-ADMINISTRABLE DRUG: Performed by: INTERNAL MEDICINE

## 2021-01-10 PROCEDURE — 80053 COMPREHEN METABOLIC PANEL: CPT | Performed by: HOSPITALIST

## 2021-01-10 PROCEDURE — 36415 COLL VENOUS BLD VENIPUNCTURE: CPT | Performed by: INTERNAL MEDICINE

## 2021-01-10 PROCEDURE — 85025 COMPLETE CBC W/AUTO DIFF WBC: CPT | Performed by: HOSPITALIST

## 2021-01-10 PROCEDURE — 20600000 HC ROOM AND CARE INTERMEDIATE/TELEMETRY

## 2021-01-10 PROCEDURE — 63700000 HC SELF-ADMINISTRABLE DRUG: Performed by: HOSPITALIST

## 2021-01-10 PROCEDURE — 83880 ASSAY OF NATRIURETIC PEPTIDE: CPT | Performed by: PHYSICIAN ASSISTANT

## 2021-01-10 RX ADMIN — ATORVASTATIN CALCIUM 10 MG: 10 TABLET, FILM COATED ORAL at 17:09

## 2021-01-10 RX ADMIN — RIVAROXABAN 15 MG: 15 TABLET, FILM COATED ORAL at 17:09

## 2021-01-10 RX ADMIN — THERA TABS 1 TABLET: TAB at 08:46

## 2021-01-10 RX ADMIN — ASPIRIN 81 MG: 81 TABLET, COATED ORAL at 08:46

## 2021-01-10 NOTE — PLAN OF CARE
Problem: Adult Inpatient Plan of Care  Goal: Plan of Care Review  Flowsheets (Taken 1/10/2021 1218)  Outcome Summary: CM had the RN fax her the patient's prescription card to check the subsequent copay cost of the Xarelto vs Eliquis.  Called [employer], [employer], [phone] and spoke with Jace.  Patient has an initial high cost for the first month due to a deductible.  His subsequent copay cost would be: Xarelto $32.19 and Eliquis $100.64.  Patient called and notified.  He is agreeable to Xarelto and the cost.  [facility] and RN notified via secure chat.

## 2021-01-10 NOTE — NURSING NOTE
Pt AAOx3, calm, cooperative. Heparin drip d/c'd, started on xarelto. Pt OOB to chair, tolerated well. +BM on BSC. Remains on 3L NC. Monitored per unit orders.

## 2021-01-10 NOTE — NURSING NOTE
Pt aaox4. Denies pain.   and 104 sec.Heparin gtt held twice.  3rd result @ 59 sec Heparin gtt restarted @ 1150 units/hr. Next blood to be obtained for PTT is  @ 1140 today. Call light in reach. Will continue to monitor.

## 2021-01-10 NOTE — PROGRESS NOTES
Hospital Medicine Service -  Daily Progress Note       SUBJECTIVE   Interval History: Pleasant, agreeable to Xarelto and says that the cost will be reasonable after his medication deductible is paid. No fevers, chills, nausea, vomiting. Wants us to check another home O2 screen tomorrow.      OBJECTIVE      Vital signs in last 24 hours:  Temp:  [36.6 °C (97.8 °F)-37.2 °C (98.9 °F)] 36.9 °C (98.4 °F)  Heart Rate:  [50-66] 53  Resp:  [16-21] 20  BP: ()/(51-67) 95/61    Intake/Output Summary (Last 24 hours) at 1/10/2021 1003  Last data filed at 1/9/2021 2300  Gross per 24 hour   Intake --   Output 250 ml   Net -250 ml       PHYSICAL EXAMINATION      Physical Exam  Vitals signs and nursing note reviewed.   Constitutional:       Appearance: Normal appearance. He is well-developed.   HENT:      Head: Normocephalic and atraumatic.      Mouth/Throat:      Mouth: Mucous membranes are moist.      Pharynx: Oropharynx is clear.   Eyes:      Extraocular Movements: Extraocular movements intact.      Pupils: Pupils are equal, round, and reactive to light.   Neck:      Musculoskeletal: Normal range of motion and neck supple.   Cardiovascular:      Rate and Rhythm: Normal rate and regular rhythm.      Pulses: Normal pulses.      Heart sounds: Normal heart sounds.   Pulmonary:      Effort: Pulmonary effort is normal.      Breath sounds: Normal breath sounds.   Abdominal:      General: Abdomen is flat. Bowel sounds are normal.      Palpations: Abdomen is soft.   Musculoskeletal: Normal range of motion.   Skin:     General: Skin is warm and dry.   Neurological:      General: No focal deficit present.      Mental Status: He is alert and oriented to person, place, and time. Mental status is at baseline.   Psychiatric:         Mood and Affect: Mood normal.         Behavior: Behavior normal.         Thought Content: Thought content normal.         Judgment: Judgment normal.        LINES, CATHETERS, DRAINS, AIRWAYS, AND WOUNDS    Lines, Drains, Airways, Wounds:  Peripheral IV 01/01/21 Anterior;Left;Proximal Forearm (Active)   Number of days: 9       Peripheral IV 01/09/21 Anterior;Left Forearm (Active)   Number of days: 1       Comments:      LABS / IMAGING / TELE      Labs  I have reviewed the patient's pertinent labs.  Significant abnormals are Na 132, Hb 10.9.    No results found for: COVID19    Imaging  Not applicable    ECG/Telemetry  I have independently reviewed the telemetry. No events for the last 24 hours.    ASSESSMENT AND PLAN      Acute pulmonary embolism (CMS/HCC)  Assessment & Plan  CT PE on 1/8 found multiple right sided PE's  No evidence of right heart strain  Check TTE and leg US monday  Heme/onc consulted, duration of treatment for embolisms caused by COVID still in flux, plan for 3-6 months treatment  Started on hep gtt  Will price check xarelto and eliquis with CM  This would help explain his persistent dyspnea with even mild exertion  He is agreeable to Xarelto, plan for this at discharge, change to Xarelto today at 5p, stop hep gtt at 3p    Acute respiratory failure with hypoxia (CMS/HCC)  Assessment & Plan  Due to COVID-19  Prone protocol  Chest x-ray with progressive patchy airspace opacities  Pulmonary, infectious disease following  Minimal oxygen requirements at rest but drops down to mid 80's with activity even with supplemental oxygen  Continue to supplement as needed  Continue PT/OT    Pancytopenia (CMS/HCC)  Assessment & Plan  Likely related to acute infection  Monitor CBC w/daily labs  Consult heme/onc, signed off now that numbers are improving  Improving daily    Coronary artery disease involving native coronary artery of native heart without angina pectoris  Assessment & Plan  Chronic, asymptomatic  Continue Lipitor, ASA    * Pneumonia due to COVID-19 virus  Assessment & Plan  Covid pneumonia with acute hypoxic respiratory failure  Chest x-ray with progressive patchy airspace opacities noted  bilaterally  Not a candidate for remdesivir as symptoms started 13 days prior to admission  Decadron transitioned to the high dose protocol, completed 20 mg x5 days, currently getting 10 mg x5 days  Cultures no growth to 72 hours  Empiric antibiotics for suspected secondary bacterial infection  Rocephin transitioned to Ceftin to complete the course  Continue prone protocol  Stop trending inflammatory markers  Notified by Iinfection control that it would be OK to remove isolation precautions on hospital day 14     VTE Assessment: Padua VTE Score: 1  VTE Prophylaxis Plan: Hep gtt -> Xarelto  Code Status: Full Code  Estimated Discharge Date: 1/11/2021   Disposition Planning: Pending improvement of his DOMINGUEZ     Salinas Villalta, DO  1/10/2021

## 2021-01-10 NOTE — PROGRESS NOTES
Pulmonary Progress Note      Subjective:  Resting in bed. Comfortable on nasal cannula.  Feeling better overall today. Less dyspneic. Anxious for d/c.  Plan to transition to Xarelto today.  Agreeable to follow up w/ our group at discharge.  Denies any fevers, chills, chest congestion.  Chart/labs/vitals reviewed.    Allergies: Patient has no known allergies.    Medications:  Current Facility-Administered Medications   Medication Dose Route Frequency Provider Last Rate Last Admin   • acetaminophen (TYLENOL) tablet 650 mg  650 mg oral q4h PRN Debbie Whitfield MD   650 mg at 12/25/20 2130    Or   • acetaminophen (TYLENOL) suppository 650 mg  650 mg rectal q4h PRN Debbie Whitfield MD        Or   • acetaminophen (TYLENOL) 650 mg/20.3 mL solution 650 mg  650 mg oral q4h PRN Debbie Whitfield MD       • albuterol HFA (VENTOLIN HFA) 90 mcg/actuation inhaler 2 puff  2 puff inhalation q6h PRN Debbie Whitfield MD       • aspirin enteric coated tablet 81 mg  81 mg oral Daily Debbie Whitfield MD   81 mg at 01/10/21 0846   • atorvastatin (LIPITOR) tablet 10 mg  10 mg oral Daily (6p) Debbie Whitfield MD   10 mg at 01/09/21 1722   • glucose chewable tablet 16-32 g of dextrose  16-32 g of dextrose oral PRN Debbie Whitfield MD        Or   • dextrose 40 % oral gel 15-30 g of dextrose  15-30 g of dextrose oral PRN Debbie Whitfield MD        Or   • glucagon (GLUCAGEN) injection 1 mg  1 mg intramuscular PRN Debbie Whitfield MD        Or   • dextrose in water injection 12.5 g  25 mL intravenous PRN Debbie Whitfield MD       • heparin (porcine) bolus from bag 3,050-6,150 Units  40-80 Units/kg intravenous q6h PRN Salinas Villalta DO       • heparin infusion in 0.45%  units/mL  0-4,000 Units/hr intravenous Titrated Salinas Villalta DO 11.5 mL/hr at 01/10/21 0545 1,150 Units/hr at 01/10/21 0545   • melatonin capsule 5 mg  5 mg oral Nightly PRN Debbie Whitfield MD   5 mg at 01/09/21 8322   • morphine injection 1 mg  1 mg intravenous q3h PRN Garland,  Reagan DAS MD       • multivitamin tablet 1 tablet  1 tablet oral Daily Debbie Whitfield MD   1 tablet at 01/10/21 0846   • ondansetron (ZOFRAN) injection 4 mg  4 mg intravenous q8h PRN Debbie Whitfield MD       • rivaroxaban (XARELTO) tablet 15 mg  15 mg oral BID with meals Salinas Villalta, DO        Followed by   • [START ON 1/31/2021] rivaroxaban (XARELTO) tablet 20 mg  20 mg oral Daily with dinner Salinas Villalta, DO       • senna (SENOKOT) tablet 2 tablet  2 tablet oral Nightly PRN PawanLucas barnesel, DO   2 tablet at 01/09/21 6104      Review of Systems:  Unchanged except as noted in HPI    Vital signs in last 24 hours:  Temp:  [36.6 °C (97.8 °F)-37.2 °C (98.9 °F)] 36.9 °C (98.4 °F)  Heart Rate:  [50-66] 53  Resp:  [16-21] 20  BP: ()/(51-67) 95/61    Input/Ouput in Last 24 hours:    Intake/Output Summary (Last 24 hours) at 1/10/2021 1110  Last data filed at 1/9/2021 2300  Gross per 24 hour   Intake --   Output 250 ml   Net -250 ml     Physical Exam  General: Elderly male, NAD, requiring 3LNC  HEENT: Moist membranes, PERRL   Neck: Supple, no JVD, no tug or stridor, trach midline  Cardiac: RRR, +S1/S2, no murmur appreciated  Lungs: Normal effort, not in any acute distress  Diminished breath sounds, greatest at bases  Clear to auscultation throughout  Rare inspiratory rales at bases (improving)  No wheeze/rhonchi  Abdomen: Soft, NT/ND, +BS, no rebound/guarding   : Deferred  Extremities: No edema, clubbing or cyanosis  Musculoskeletal: No joint deformity  Vascular: No ischemia noted  Neuro: AAOx3, nonfocal  Skin: Warm, limited exam, defer to attending/nursing  Psych: Cooperative, appropriate    Labs:    Lab Results   Component Value Date    WBC 4.63 01/10/2021    HGB 10.9 (L) 01/10/2021    HCT 31.9 (L) 01/10/2021     01/10/2021    ALT 47 01/10/2021    AST 22 01/10/2021     (L) 01/10/2021    K 4.0 01/10/2021     01/10/2021    CREATININE 0.6 (L) 01/10/2021    BUN 17 01/10/2021    CO2 24 01/10/2021     INR 1.1 01/08/2021     Imaging:  No new chest imaging to review.    ECG/Telemetry: SR @ 65    IMPRESSION AND PLAN    Acute hypoxic respiratory failure, slowly improving  Not O2 dependent at baseline. Oxygen requirements secondary to COVID infection/PE  - Currently on 3 liter low flow nasal cannula.  - Titrate FiO2 to maintain SpO2 >= 90%  -- Wean as condition permits.  - Incentive spirometry encouraged.  - Modified proning as tolerated.  - Obtain home O2 eval prior to discharge.     Pulmonary emboli  CTA with multiple right sided pulmonary emboli. No evidence of RH strain  - Likely provoked in the setting of underlying COVID19 infection.  - Started on IV Heparin gtt.  - Troponin < 0.02, Check BNP  - DVT panel ordered.  - 2D echo ordered.  - Evaluated by hematology/oncology.  -- Plan to transition to Xarelto today. Anticipate 3-6 months of therapy.  --- Duration can be determined at outpatient follow up.      Coronavirus (COVID-19) infection  PCR positive on 12/14.  - Maintain contact and droplet precautions  - Monitor inflammatory markers intermittently  - Completed 10 day course of high dose IV Decadron.   -- No indication for additional steroids at discharge.  - ID following. Supportive care  - Albuterol HFA PRN   - Recommend outpatient follow up CT chest in 6-8 weeks to ensure resolution of infiltrates.  - Outpatient follow up with pulmonary at discharge.  -- Office contact information/card provided to patient today.     Mediastinal lymphadenopathy  Demonstrated on CTA chest. Likely reactive in the setting of known COVID19 infection.  - Follow up on outpatient CT chest as per above to ensure resolution/stability.     Former Smoker  30 pack year history. Quit 26 years ago     -- Additional history per EMR/attending.     Diet: Regular consistency, 2gm potassium  DVT prophylaxis: IV Heparin gtt  Code status: Full code  Case d/w: patient

## 2021-01-11 ENCOUNTER — APPOINTMENT (INPATIENT)
Dept: CARDIOLOGY | Facility: HOSPITAL | Age: 66
DRG: 177 | End: 2021-01-11
Attending: HOSPITALIST
Payer: MEDICARE

## 2021-01-11 LAB
ALBUMIN SERPL-MCNC: 2.3 G/DL (ref 3.4–5)
ALP SERPL-CCNC: 45 IU/L (ref 35–126)
ALT SERPL-CCNC: 44 IU/L (ref 16–63)
ANION GAP SERPL CALC-SCNC: 7 MEQ/L (ref 3–15)
AORTIC ROOT ANNULUS - M-MODE: 3.5 CM
AST SERPL-CCNC: 20 IU/L (ref 15–41)
BILIRUB SERPL-MCNC: 0.9 MG/DL (ref 0.3–1.2)
BSA FOR ECHO PROCEDURE: 1.93 M2
BSA FOR ECHO PROCEDURE: 1.93 M2
BUN SERPL-MCNC: 14 MG/DL (ref 8–20)
CALCIUM SERPL-MCNC: 7.9 MG/DL (ref 8.9–10.3)
CHLORIDE SERPL-SCNC: 102 MEQ/L (ref 98–109)
CO2 SERPL-SCNC: 23 MEQ/L (ref 22–32)
CREAT SERPL-MCNC: 0.6 MG/DL (ref 0.8–1.3)
E WAVE DECELERATION TIME: 343 MS
E/A RATIO: 1.2
E/E' RATIO: 9.4
E/LAT E' RATIO: 8.3
ERYTHROCYTE [DISTWIDTH] IN BLOOD BY AUTOMATED COUNT: 14 % (ref 11.6–14.4)
FRACTIONAL SHORTENING: 33.4 %
GFR SERPL CREATININE-BSD FRML MDRD: >60 ML/MIN/1.73M*2
GLUCOSE SERPL-MCNC: 76 MG/DL (ref 70–99)
HCT VFR BLDCO AUTO: 32.8 % (ref 40.1–51)
HGB BLD-MCNC: 11.2 G/DL (ref 13.7–17.5)
INTERVENTRICULAR SEPTUM: 0.76 CM
LAD 2D: 3.2 CM
LEFT INTERNAL DIMENSION IN SYSTOLE: 3.19 CM (ref 2.72–4.12)
LEFT VENTRICULAR INTERNAL DIMENSION IN DIASTOLE: 4.79 CM (ref 4.61–6.4)
LEFT VENTRICULAR POSTERIOR WALL IN END DIASTOLE: 1.06 CM (ref 0.6–1.12)
MCH RBC QN AUTO: 30.6 PG (ref 28–33.2)
MCHC RBC AUTO-ENTMCNC: 34.1 G/DL (ref 32.2–36.5)
MCV RBC AUTO: 89.6 FL (ref 83–98)
MV E'TISSUE VEL-LAT: 0.09 M/S
MV E'TISSUE VEL-MED: 0.08 M/S
MV PEAK A VEL: 0.62 M/S
MV PEAK E VEL: 0.73 M/S
MV VALVE AREA P 1/2 METHOD: 2.2 CM2
PDW BLD AUTO: 11.3 FL (ref 9.4–12.4)
PLATELET # BLD AUTO: 153 K/UL (ref 150–350)
POSTERIOR WALL: 1.06 CM
POTASSIUM SERPL-SCNC: 4.3 MEQ/L (ref 3.6–5.1)
PROT SERPL-MCNC: 5.5 G/DL (ref 6–8.2)
RBC # BLD AUTO: 3.66 M/UL (ref 4.5–5.8)
SODIUM SERPL-SCNC: 132 MEQ/L (ref 136–144)
TR MAX PG: 13 MMHG
TRICUSPID VALVE PEAK REGURGITATION VELOCITY: 1.78 M/S
WBC # BLD AUTO: 4.47 K/UL (ref 3.8–10.5)
Z-SCORE OF LEFT VENTRICULAR DIMENSION IN END DIASTOLE: -1.26
Z-SCORE OF LEFT VENTRICULAR DIMENSION IN END SYSTOLE: -0.38
Z-SCORE OF LEFT VENTRICULAR POSTERIOR WALL IN END DIASTOLE: 1.33

## 2021-01-11 PROCEDURE — 99233 SBSQ HOSP IP/OBS HIGH 50: CPT | Mod: CR | Performed by: INTERNAL MEDICINE

## 2021-01-11 PROCEDURE — 94761 N-INVAS EAR/PLS OXIMETRY MLT: CPT

## 2021-01-11 PROCEDURE — 63700000 HC SELF-ADMINISTRABLE DRUG: Performed by: HOSPITALIST

## 2021-01-11 PROCEDURE — 63700000 HC SELF-ADMINISTRABLE DRUG: Performed by: INTERNAL MEDICINE

## 2021-01-11 PROCEDURE — 80053 COMPREHEN METABOLIC PANEL: CPT | Performed by: HOSPITALIST

## 2021-01-11 PROCEDURE — 93970 EXTREMITY STUDY: CPT

## 2021-01-11 PROCEDURE — 93306 TTE W/DOPPLER COMPLETE: CPT

## 2021-01-11 PROCEDURE — 20600000 HC ROOM AND CARE INTERMEDIATE/TELEMETRY

## 2021-01-11 PROCEDURE — 85027 COMPLETE CBC AUTOMATED: CPT | Performed by: INTERNAL MEDICINE

## 2021-01-11 PROCEDURE — 93970 EXTREMITY STUDY: CPT | Mod: 26 | Performed by: SURGERY

## 2021-01-11 RX ADMIN — RIVAROXABAN 15 MG: 15 TABLET, FILM COATED ORAL at 08:06

## 2021-01-11 RX ADMIN — RIVAROXABAN 15 MG: 15 TABLET, FILM COATED ORAL at 17:18

## 2021-01-11 RX ADMIN — ATORVASTATIN CALCIUM 10 MG: 10 TABLET, FILM COATED ORAL at 17:17

## 2021-01-11 RX ADMIN — THERA TABS 1 TABLET: TAB at 08:06

## 2021-01-11 RX ADMIN — ASPIRIN 81 MG: 81 TABLET, COATED ORAL at 08:05

## 2021-01-11 ASSESSMENT — ENCOUNTER SYMPTOMS
PALPITATIONS: 0
STRIDOR: 0
COUGH: 0
WHEEZING: 0
SHORTNESS OF BREATH: 1

## 2021-01-11 NOTE — PROGRESS NOTES
Hospital Medicine Service -  Daily Progress Note       SUBJECTIVE   Interval History: Patient complains of dyspnea on exertion     OBJECTIVE      Vital signs in last 24 hours:  Temp:  [36.4 °C (97.6 °F)-36.8 °C (98.3 °F)] 36.8 °C (98.3 °F)  Heart Rate:  [44-70] 58  Resp:  [18-20] 20  BP: ()/(53-67) 97/56  FiO2 (%) (Set):  [32 %] 32 %    Intake/Output Summary (Last 24 hours) at 1/11/2021 1634  Last data filed at 1/11/2021 0000  Gross per 24 hour   Intake --   Output 500 ml   Net -500 ml       PHYSICAL EXAMINATION      Constitutional : appears in mild acute distress  Eyes : Extraocular movements are intact, pupils are equal in size and reactive to light bilaterally.    ENMT: JVD is not enlarged.  No lesions on oral mucosa.  Head is atraumatic  Respiratory : Decreased breath sounds lung bases to auscultation bilaterally  Cardiovascular : Normal first and second heart sounds.  No heart murmurs  GI : Soft, nontender.  There is no organomegaly.  Bowel sounds are normal  Musculoskeletal : Normal range of motion  Extremity: There is no cyanosis, clubbing or edema.  Distal pulses are 2+ bilateral dorsalis pedis  Skin: Warm to touch.  No rashes, no ulcers  Psychiatry: Patient is cooperative, appropriate.  No delusions or hallucinations  Neurological: Awake alert and oriented x 3.  Motor strength is equal bilateral upper and lower extremities.  Sensations are intact bilaterally. CN 2-12 grossly intact     LABS / IMAGING / TELE      Labs  Lab Results   Component Value Date    WBC 4.47 01/11/2021    HGB 11.2 (L) 01/11/2021    HCT 32.8 (L) 01/11/2021    MCV 89.6 01/11/2021     01/11/2021     Lab Results   Component Value Date    GLUCOSE 76 01/11/2021    CALCIUM 7.9 (L) 01/11/2021     (L) 01/11/2021    K 4.3 01/11/2021    CO2 23 01/11/2021     01/11/2021    BUN 14 01/11/2021    CREATININE 0.6 (L) 01/11/2021                 Results from last 7 days   Lab Units 01/11/21  0430 01/10/21  0310 01/09/21  0646    ALBUMIN g/dL 2.3* 2.3* 2.2*                 Results from last 7 days   Lab Units 01/11/21  0430 01/10/21  0310 01/09/21  0646   SODIUM mEQ/L 132* 132* 133*   POTASSIUM mEQ/L 4.3 4.0 3.9   CHLORIDE mEQ/L 102 103 103   CO2 mEQ/L 23 24 25   BUN mg/dL 14 17 25*   CREATININE mg/dL 0.6* 0.6* 0.7*   CALCIUM mg/dL 7.9* 8.1* 7.9*   ALBUMIN g/dL 2.3* 2.3* 2.2*   BILIRUBIN TOTAL mg/dL 0.9 1.0 1.1   ALK PHOS IU/L 45 45 41   ALT IU/L 44 47 52   AST IU/L 20 22 21   GLUCOSE mg/dL 76 89 81             Results from last 7 days   Lab Units 01/10/21  1148 01/10/21  0447 01/10/21  0310  01/08/21  2343   INR INR  --   --   --   --  1.1   PTT sec 91* 59* 104*   < > 32    < > = values in this interval not displayed.     Results from last 7 days   Lab Units 01/11/21  0430 01/10/21  0310 01/09/21  0646   ALK PHOS IU/L 45 45 41   BILIRUBIN TOTAL mg/dL 0.9 1.0 1.1   ALBUMIN g/dL 2.3* 2.3* 2.2*   ALT IU/L 44 47 52   AST IU/L 20 22 21     No results found for: TROPONINT          Results from last 7 days   Lab Units 01/07/21  0400 01/05/21  0613   FERRITIN ng/mL 1,861* 1,733*  1,786*        Imaging  Significant findings include: Multiple right-sided PE      ECG/Telemetry  I have independently reviewed the telemetry. Significant findings include Sinus bradycardia rate 57/min.  LINES, CATHETERS, DRAINS, AIRWAYS, AND WOUNDS   Lines, Drains, Airways, Wounds:  Peripheral IV 01/01/21 Anterior;Left;Proximal Forearm (Active)   Number of days: 10       Peripheral IV 01/09/21 Anterior;Left Forearm (Active)   Number of days: 2       Comments:          ASSESSMENT AND PLAN      * Pneumonia due to COVID-19 virus  Assessment & Plan  Covid pneumonia with acute hypoxic respiratory failure  Chest x-ray with progressive patchy airspace opacities noted bilaterally  Not a candidate for remdesivir as symptoms started 13 days prior to admission  Decadron transitioned to the high dose protocol, completed 20 mg x5 days, currently getting 10 mg x5 days  Cultures no  growth to 72 hours  Empiric antibiotics for suspected secondary bacterial infection  Rocephin transitioned to Ceftin to complete the course  Continue prone protocol  Stop trending inflammatory markers  Notified by Iinfection control that it would be OK to remove isolation precautions on hospital day 14    Acute respiratory failure with hypoxia (CMS/HCC)  Assessment & Plan  Due to COVID-19  Prone protocol  Chest x-ray with progressive patchy airspace opacities  Pulmonary, infectious disease following  Minimal oxygen requirements at rest but drops down to mid 80's with activity even with supplemental oxygen  Continue to supplement as needed  Continue PT/OT    Pancytopenia (CMS/HCC)  Assessment & Plan  Likely related to acute infection  Monitor CBC w/daily labs  Consult heme/onc, signed off now that numbers are improving  Improving daily    Coronary artery disease involving native coronary artery of native heart without angina pectoris  Assessment & Plan  Chronic, asymptomatic  Continue Lipitor, ASA    Acute pulmonary embolism (CMS/HCC)  Assessment & Plan  CT PE on 1/8 found multiple right sided PE's  No evidence of right heart strain, Normal-sized RV. Normal RV systolic function.  Positive acute DVT left peroneal vein extending into the tibioperoneal trunk  Heme/onc consulted, duration of treatment for embolisms caused by COVID still in flux, plan for 3-6 months treatment  Will price check xarelto and eliquis with CM  This would help explain his persistent dyspnea with even mild exertion  Xarelto         VTE Assessment: Padua VTE Score: 1  VTE Prophylaxis Plan: Xarelto  Code Status: Full Code  Estimated Discharge Date: 1/11/2021  Disposition Planning: Not ready for discharge due to high oxygen requirements with ambulation.  Discussed with respiratory therapist     Reagan Haque MD  1/11/2021

## 2021-01-11 NOTE — PROGRESS NOTES
"SUBJECTIVE: Continues to desaturate with exertion.  He is using O2 2 L at rest and up to 6 L for exertion.  His degree of hypoxemia is likely secondary to both recent Covid pneumonitis as well as pulmonary embolism.  Noted to have a left peroneal DVT.    Allergies:   Patient has no known allergies.    Review of Systems:  Review of Systems   Respiratory: Positive for shortness of breath. Negative for cough, wheezing and stridor.    Cardiovascular: Negative for chest pain, palpitations and leg swelling.       Input/Ouput in Last 24 hours:    Intake/Output Summary (Last 24 hours) at 1/11/2021 1240  Last data filed at 1/11/2021 0000  Gross per 24 hour   Intake --   Output 500 ml   Net -500 ml       Objective     Vital Signs for the last 24 hours:  Visit Vitals  /67 (BP Location: Left upper arm, Patient Position: Sitting)   Pulse (!) 55   Temp 36.8 °C (98.3 °F) (Oral)   Resp 20   Ht 1.753 m (5' 9.02\")   Wt 76.7 kg (169 lb)   SpO2 97%   BMI 24.95 kg/m²     Oxygen Therapy: Supplemental oxygen    Physical Exam:  Physical Exam  Constitutional:       Appearance: Normal appearance.   HENT:      Head: Normocephalic and atraumatic.      Mouth/Throat:      Pharynx: No posterior oropharyngeal erythema.   Eyes:      General: No scleral icterus.  Cardiovascular:      Rate and Rhythm: Normal rate.      Heart sounds: No murmur.   Pulmonary:      Effort: Pulmonary effort is normal.      Breath sounds: Normal breath sounds.   Abdominal:      Palpations: Abdomen is soft.   Musculoskeletal:         General: No swelling.   Skin:     General: Skin is warm and dry.   Neurological:      Mental Status: He is alert and oriented to person, place, and time.   Psychiatric:         Behavior: Behavior normal.          LABS:  Results from last 7 days   Lab Units 01/11/21  0430 01/10/21  0310 01/09/21  0646   SODIUM mEQ/L 132* 132* 133*   CO2 mEQ/L 23 24 25   BUN mg/dL 14 17 25*   CALCIUM mg/dL 7.9* 8.1* 7.9*   ALBUMIN g/dL 2.3* 2.3* 2.2* "     Results from last 7 days   Lab Units 01/11/21  0430 01/10/21  0310 01/09/21  0646   WBC K/uL 4.47 4.63 6.66   PLATELETS K/uL 153 218 237     Lab Results   Component Value Date    WBC 4.47 01/11/2021    HGB 11.2 (L) 01/11/2021    HCT 32.8 (L) 01/11/2021     01/11/2021    ALT 44 01/11/2021    AST 20 01/11/2021     (L) 01/11/2021    K 4.3 01/11/2021     01/11/2021    CREATININE 0.6 (L) 01/11/2021    BUN 14 01/11/2021    CO2 23 01/11/2021    INR 1.1 01/08/2021       Current Facility-Administered Medications   Medication Dose Route Frequency Provider Last Rate Last Admin   • acetaminophen (TYLENOL) tablet 650 mg  650 mg oral q4h PRN Debbie Whitfield MD   650 mg at 12/25/20 2130    Or   • acetaminophen (TYLENOL) suppository 650 mg  650 mg rectal q4h PRN Debbie Whitfield MD        Or   • acetaminophen (TYLENOL) 650 mg/20.3 mL solution 650 mg  650 mg oral q4h PRN Debbie Whitfield MD       • albuterol HFA (VENTOLIN HFA) 90 mcg/actuation inhaler 2 puff  2 puff inhalation q6h PRN Debbie Whitfield MD       • aspirin enteric coated tablet 81 mg  81 mg oral Daily Debbie Whitfield MD   81 mg at 01/11/21 0805   • atorvastatin (LIPITOR) tablet 10 mg  10 mg oral Daily (6p) Debbie Whitfield MD   10 mg at 01/10/21 1709   • glucose chewable tablet 16-32 g of dextrose  16-32 g of dextrose oral PRN Debbie Whitfield MD        Or   • dextrose 40 % oral gel 15-30 g of dextrose  15-30 g of dextrose oral PRN Debbie Whitfield MD        Or   • glucagon (GLUCAGEN) injection 1 mg  1 mg intramuscular PRN Debbie Whitfield MD        Or   • dextrose in water injection 12.5 g  25 mL intravenous PRN Debbie Whitfield MD       • melatonin capsule 5 mg  5 mg oral Nightly PRN Debbie Whitfield MD   5 mg at 01/09/21 2144   • morphine injection 1 mg  1 mg intravenous q3h PRN Reagan Haque MD       • multivitamin tablet 1 tablet  1 tablet oral Daily Debbie Whitfield MD   1 tablet at 01/11/21 0806   • ondansetron (ZOFRAN) injection 4 mg  4 mg  intravenous q8h PRN Debbie Whitfield MD       • rivaroxaban (XARELTO) tablet 15 mg  15 mg oral BID with meals Pawan, Salinas, DO   15 mg at 01/11/21 0806    Followed by   • [START ON 1/31/2021] rivaroxaban (XARELTO) tablet 20 mg  20 mg oral Daily with dinner Pawan, Salinas, DO       • senna (SENOKOT) tablet 2 tablet  2 tablet oral Nightly PRN Pawan, Salinas, DO   2 tablet at 01/09/21 2324         IMPRESSION AND PLAN :       Acute hypoxic respiratory failure, slowly improving  Not O2 dependent at baseline. Oxygen requirements secondary to COVID infection/PE  - Currently on 2 liter low flow nasal cannula.  - Titrate FiO2 to maintain SpO2 >= 90%, desaturates with 6 L of nasal cannula  -- Wean as condition permits.  - Incentive spirometry encouraged.  - Modified proning as tolerated.  - Obtain home O2 eval prior to discharge.     Pulmonary emboli  CTA with multiple right sided pulmonary emboli. No evidence of RH strain  - Likely provoked in the setting of underlying COVID19 infection.  - Started on IV Heparin gtt.  - Troponin < 0.02,  - DVT panel L peroneal DVT  - 2D echo ordered.  - Evaluated by hematology/oncology.  --On p.o. Xarelto anticipate 3-6 months of therapy.  --- Duration can be determined at outpatient follow up.      Coronavirus (COVID-19) infection  PCR positive on 12/14.  - Maintain contact and droplet precautions  - Monitor inflammatory markers intermittently  - Completed 10 day course of high dose IV Decadron.   -- No indication for additional steroids at discharge.  - ID following. Supportive care  - Albuterol HFA PRN   - Recommend outpatient follow up CT chest in 6-8 weeks to ensure resolution of infiltrates.  - Outpatient follow up with pulmonary at discharge.  -- Office contact information/card provided to patient today.     Mediastinal lymphadenopathy  Demonstrated on CTA chest. Likely reactive in the setting of known COVID19 infection.  - Follow up on outpatient CT chest as per above to ensure  resolution/stability.     Former Smoker  30 pack year history. Quit 26 years ago          Diet: Regular consistency, 2gm potassium  DVT prophylaxis: Xarelto  Code status: Full code  Case d/w: patient

## 2021-01-11 NOTE — PATIENT CARE CONFERENCE
Care Progression Rounds Note  Date: 1/11/2021  Time: 12:34 PM     Patient Name: Nadeem Morales     Medical Record Number: [id number]   YOB: 1955  Sex: Male      Room/Bed: 4417D    Admitting Diagnosis: Shortness of breath [R06.02]  Hypoxia [R09.02]  COVID-19 virus infection [U07.1]  Pneumonia due to COVID-19 virus [U07.1, J12.82]   Admit Date/Time: 12/24/2020  8:28 PM    Primary Diagnosis: Pneumonia due to COVID-19 virus  Principal Problem: Pneumonia due to COVID-19 virus    GMLOS: 5.4  Anticipated Discharge Date: 1/11/2021    AM-PAC  Mobility Score: 24    Discharge Planning:  Living Arrangements: house  Anticipated Discharge Disposition: home without services    Barriers to Discharge:  Barriers to Discharge: Medical issues not resolved  Comment: failed home o2, desats while walking, not ready for dc    Participants:  , nursing, social work/services

## 2021-01-11 NOTE — NURSING NOTE
Home Oxygen Qualification Protocol    Findings:  Room Air Spo2 at rest: 89 %  Room Air SpO2 % with ambulation: 82 %(placed on 3l  Sao2 90%)  SPO2 % ambulation with oxygen: 83 %(unable to determine desat to 80's had to sit down)    Conclusion:  Meets criteria for home oxygen: Yes  Does not meet criteria for home oxygen: No    Oxygen Recommendation:  Recommended lpm at rest: 3 lpm  Recommended lpm with ambulation: (unable to walk, pt desatd to mid 80s and had to sit down)       The information above was collected from the most recent home oxygen qualification protocol performed on the patient.

## 2021-01-11 NOTE — PLAN OF CARE
Problem: Adult Inpatient Plan of Care  Goal: Plan of Care Review  Outcome: Not progressing  Flowsheets ( 01/11/2021 1210)  Outcome Summary: repeat home 02 eval done this am, pt is  unable to complete the eval, pt drops to Pox 80% with movement, standing or ambulating, Started on Xarelto yesterday.   Not ready for discharge today.

## 2021-01-12 VITALS
BODY MASS INDEX: 25.03 KG/M2 | RESPIRATION RATE: 20 BRPM | TEMPERATURE: 98.9 F | SYSTOLIC BLOOD PRESSURE: 111 MMHG | DIASTOLIC BLOOD PRESSURE: 68 MMHG | HEIGHT: 69 IN | OXYGEN SATURATION: 98 % | WEIGHT: 169 LBS | HEART RATE: 59 BPM

## 2021-01-12 LAB
ALBUMIN SERPL-MCNC: 2.5 G/DL (ref 3.4–5)
ALP SERPL-CCNC: 50 IU/L (ref 35–126)
ALT SERPL-CCNC: 47 IU/L (ref 16–63)
ANION GAP SERPL CALC-SCNC: 7 MEQ/L (ref 3–15)
AST SERPL-CCNC: 23 IU/L (ref 15–41)
BILIRUB SERPL-MCNC: 1 MG/DL (ref 0.3–1.2)
BUN SERPL-MCNC: 15 MG/DL (ref 8–20)
CALCIUM SERPL-MCNC: 8.1 MG/DL (ref 8.9–10.3)
CHLORIDE SERPL-SCNC: 101 MEQ/L (ref 98–109)
CO2 SERPL-SCNC: 24 MEQ/L (ref 22–32)
CREAT SERPL-MCNC: 0.6 MG/DL (ref 0.8–1.3)
ERYTHROCYTE [DISTWIDTH] IN BLOOD BY AUTOMATED COUNT: 14 % (ref 11.6–14.4)
GFR SERPL CREATININE-BSD FRML MDRD: >60 ML/MIN/1.73M*2
GLUCOSE SERPL-MCNC: 77 MG/DL (ref 70–99)
HCT VFR BLDCO AUTO: 34.4 % (ref 40.1–51)
HGB BLD-MCNC: 11.6 G/DL (ref 13.7–17.5)
MCH RBC QN AUTO: 30 PG (ref 28–33.2)
MCHC RBC AUTO-ENTMCNC: 33.7 G/DL (ref 32.2–36.5)
MCV RBC AUTO: 88.9 FL (ref 83–98)
PDW BLD AUTO: 12.2 FL (ref 9.4–12.4)
PLATELET # BLD AUTO: 152 K/UL (ref 150–350)
POTASSIUM SERPL-SCNC: 4.3 MEQ/L (ref 3.6–5.1)
PROT SERPL-MCNC: 5.7 G/DL (ref 6–8.2)
RBC # BLD AUTO: 3.87 M/UL (ref 4.5–5.8)
SODIUM SERPL-SCNC: 132 MEQ/L (ref 136–144)
WBC # BLD AUTO: 4.93 K/UL (ref 3.8–10.5)

## 2021-01-12 PROCEDURE — 80053 COMPREHEN METABOLIC PANEL: CPT | Performed by: HOSPITALIST

## 2021-01-12 PROCEDURE — 63700000 HC SELF-ADMINISTRABLE DRUG: Performed by: INTERNAL MEDICINE

## 2021-01-12 PROCEDURE — 99239 HOSP IP/OBS DSCHRG MGMT >30: CPT | Mod: CR | Performed by: INTERNAL MEDICINE

## 2021-01-12 PROCEDURE — 85027 COMPLETE CBC AUTOMATED: CPT | Performed by: INTERNAL MEDICINE

## 2021-01-12 PROCEDURE — 97116 GAIT TRAINING THERAPY: CPT | Mod: GP

## 2021-01-12 PROCEDURE — 63700000 HC SELF-ADMINISTRABLE DRUG: Performed by: HOSPITALIST

## 2021-01-12 RX ADMIN — RIVAROXABAN 15 MG: 15 TABLET, FILM COATED ORAL at 08:40

## 2021-01-12 RX ADMIN — ASPIRIN 81 MG: 81 TABLET, COATED ORAL at 08:40

## 2021-01-12 RX ADMIN — THERA TABS 1 TABLET: TAB at 08:40

## 2021-01-12 RX ADMIN — ATORVASTATIN CALCIUM 10 MG: 10 TABLET, FILM COATED ORAL at 17:09

## 2021-01-12 RX ADMIN — RIVAROXABAN 15 MG: 15 TABLET, FILM COATED ORAL at 17:09

## 2021-01-12 ASSESSMENT — COGNITIVE AND FUNCTIONAL STATUS - GENERAL
AFFECT: WFL
CLIMB 3 TO 5 STEPS WITH RAILING: 4 - NONE
WALKING IN HOSPITAL ROOM: 4 - NONE
MOVING TO AND FROM BED TO CHAIR: 4 - NONE
STANDING UP FROM CHAIR USING ARMS: 4 - NONE

## 2021-01-12 NOTE — PLAN OF CARE
Problem: Adult Inpatient Plan of Care  Goal: Plan of Care Review  Outcome: Progressing  Flowsheets (Taken 1/12/2021 1219)  Progress: improving  Plan of Care Reviewed With: patient  Outcome Summary: D/C acute care PT, rec home.     Problem: Mobility Impairment  Goal: Optimal Mobility  Outcome: Progressing

## 2021-01-12 NOTE — DISCHARGE INSTRUCTIONS
Outpatient follow up imaging in 6-8 weeks to ensure resolution of infiltrates.    Please follow up with a pulmonologist (lung physician):    Provider Name: Chino Mccarty D.O.    [facility] Outpatient Office  [address], [address] [place] [address]  Tel: [phone]  Fax: [phone]     Appointment: Call to schedule  **Please obtain referral from PCP if necessary for your insurance    [facility] [phone], INSTRUCTED TO CALL UPON ARRIVAL HOME FOR DELIVERY OF CONCENTRATOR.    [facility]  Phone: [phone]  Home care staff will call before visiting.

## 2021-01-12 NOTE — NURSING NOTE
"D/C instructions reviewed with pt, pt verbalizes understanding with no further questions. Instructed pt to  xarelto from pharmacy upon d/c, pt states daughter already filled prescription. VSS. Waiting for O2, to be delivered, call placed to desean from homecare and ilana to see what time it should be here. Pt getting frustrated and \"just wants to go home\" reassured him that we are doing what we can to get hiom out promptly.   "

## 2021-01-12 NOTE — PROGRESS NOTES
Support conversation with wife listening to many expressed feelings related to  home with O2 and anxiety about that. Provided reassurance and support. Anxious about patient compliance. Patient anxious to go home  Also visited with patient and listened to expressed feelings of reliance on shaheen and readiness to go home.

## 2021-01-12 NOTE — PROGRESS NOTES
Patient: Nadeem Morales  Location: [address]  MRN: [id number]  Today's date: 1/12/2021     Pt in bedside chair, personal items and call bell in reach. VSS and NAD. Nurse aware.    Home Oxygen Qualification Protocol    Findings:   Room air SpO2 at rest: 88%  Room air SpO2 with ambulation: 81%  SpO2 ambulation with oxygen: 92% on 4 L  Conclusion:   Meets criteria for home oxygen: yes    Oxygen Recommendation:   Recommended LPM at rest: 2L O2  Recommended LPM with ambulation: 4L O2      Nadeem TSE is a 65 y.o. male admitted on 12/24/2020 with Pneumonia due to COVID-19 virus. Principal problem is Pneumonia due to COVID-19 virus.    Past Medical History  Nadeem TSE has a past medical history of Coronary artery disease and Lipid disorder.    History of Present Illness   Nadeem Morales is a 65 y.o. male with a past medical history of CAD (s/p acute MI and stent placement) who presents with SOB.  He is a former smoker (quit >20 years ago) and has no known history of asthma, COPD or other chronic lung disease. He was diagnosed with COVID and reports worsening SOB, fever, body aches and decreased O2 readings(SpO2 in 80s) on home pulse oximeter since that time. No associated nausea or vomiting, but there has been 1 episode of diarrhea. He endorses anosmia and ageusia, returning to the ER with concerns he is not improving.       PT Vitals    Date/Time Pulse SpO2 Pt Activity O2 Therapy O2 Del Method O2 Flow Rate BP Pt Position [facility]   01/12/21 1030 78 98 % At rest Supplemental oxygen Nasal cannula 2 L/min 105/65 Sitting    01/12/21 1031 80 88 % At rest None (Room air) -- -- -- Sitting    01/12/21 1032 84 81 % ~4 mins to recover to 92% on 4L Walking None (Room air) -- -- -- Standing Z      PT Pain    Date/Time Pain Type Pref Pain Scale Rating: Rest Rating: Activity [facility]   01/12/21 1030 Pain Assessment number (Numeric Rating Pain Scale) 0 0 ZJ          Prior Living Environment      Most Recent Value    Living Arrangements  house   Living Environment Comment  Pt lives with spouse, 2SH, 2STE, powder room          Prior Level of Function      Most Recent Value   Dominant Hand  right   Ambulation  independent   Transferring  independent   Toileting  independent   Bathing  independent   Dressing  independent   Eating  independent   Assistive Device/Animal Currently Used at Home  none          PT Evaluation and Treatment - 01/12/21 1032        Time Calculation    Start Time  1005     Stop Time  1032     Time Calculation (min)  27 min        Session Details    Document Type  daily treatment/progress note     Mode of Treatment  physical therapy        General Information    Patient Profile Reviewed?  yes     Existing Precautions/Restrictions  fall;oxygen therapy device and L/min        Cognition/Psychosocial    Affect/Mental Status (Cognitive)  WFL     Orientation Status (Cognition)  oriented x 4     Follows Commands (Cognition)  WFL     Cognitive Function (Cognitive)  attention deficit     Attention Deficit (Cognitive)  minimal deficit;focused/sustained attention;perseverates on recent conversation     Comment, Cognition  tangential at times and increased VCs to redirect however otherwise pleasant and cooperative        Bed Mobility    [place], Supine to Sit  modified independence     [place], Sit to Supine  modified independence        Sit to Stand Transfer    [place], Sit to Stand Transfer  modified independence        Stand to Sit Transfer    [place], Stand to Sit Transfer  modified independence        Gait Training    [place], Gait  independent     Assistive Device  none     Distance in Feet  150 feet    2x150'    Gait Pattern Utilized  step-through     Deviations/Abnormal Patterns (Gait)  base of support, narrow     Comment  Seated rest break x 5 minutes between each trial given desat on RA; pt requiring 4 L O2 with mobility and otherwise (I) with balance. Educated pt re: activity pacing  and amb program upon DC home.         Stairs Training    [place], Stairs  modified independence     Assistive Device  railing     Handrail Location  left side (ascending);right side (descending)     Number of Stairs  8     Ascending Stairs Technique  step-over-step     Descending Stairs Technique  step-over-step     Comment  Desat on 4 L O2 requiring increased rest break prior to descending; educated pt on activity pacing and e-con with stair climbing        Balance    Balance Assessment  sitting static balance;sitting dynamic balance;standing static balance;standing dynamic balance     Static Sitting Balance  WNL     Dynamic Sitting Balance  WNL     Sit to Stand Dynamic Balance  WNL     Static Standing Balance  WNL     Dynamic Standing Balance  WNL        AM-PAC (TM) - Mobility (Current Function)    Turning from your back to your side while in a flat bed without using bedrails?  4 - None     Moving from lying on your back to sitting on the side of a flat bed without using bedrails?  4 - None     Moving to and from a bed to a chair?  4 - None     Standing up from a chair using your arms?  4 - None     To walk in a hospital room?  4 - None     Climbing 3-5 steps with a railing?  4 - None     AM-PAC (TM) Mobility Score  24        Therapy Assessment/Plan (PT)    Rehab Potential (PT)  other (see comments)    D/C PT    Therapy Frequency (PT)  --    D/C PT       Progress Summary (PT)    Daily Outcome Statement (PT)  Re-eval performed given more stable respiratory status and pt's ability to amb >household distances now. Requiring O2 at this time however otherwise (I) with all mobility. Rec home with spouse assist and HC PT services.         Therapy Plan Review/Discharge Plan (PT)    PT Recommended Discharge Disposition  home with home health     Anticipated Equipment Needs at Discharge (PT Eval)  none        Plan of Care Review    Plan of Care Reviewed With  patient                       Education provided this  session. See the Patient Education summary report for full details.

## 2021-01-12 NOTE — PROGRESS NOTES
Pulmonary Progress Note      Subjective:  Resting in bed. Comfortable on 2LNC.  Respirations are not labored at rest. Offers no complaints.  Feeling better overall. Unable to complete home O2 eval yesterday.  Admits to dyspnea with ambulation. Sats dropped to mid 80s.   Anxious for discharge.   Denies any fevers, chills, chest congestion.  Chart/labs/vitals reviewed.    Allergies: Patient has no known allergies.    Medications:  Current Facility-Administered Medications   Medication Dose Route Frequency Provider Last Rate Last Admin   • acetaminophen (TYLENOL) tablet 650 mg  650 mg oral q4h PRN Debbie Whitfield MD   650 mg at 12/25/20 2130    Or   • acetaminophen (TYLENOL) suppository 650 mg  650 mg rectal q4h PRN Debbie Whitfield MD        Or   • acetaminophen (TYLENOL) 650 mg/20.3 mL solution 650 mg  650 mg oral q4h PRN Debbie Whitfield MD       • albuterol HFA (VENTOLIN HFA) 90 mcg/actuation inhaler 2 puff  2 puff inhalation q6h PRN Debbie Whitfield MD       • aspirin enteric coated tablet 81 mg  81 mg oral Daily Debbie Whitfield MD   81 mg at 01/12/21 0840   • atorvastatin (LIPITOR) tablet 10 mg  10 mg oral Daily (6p) Debbie Whitfield MD   10 mg at 01/11/21 1717   • glucose chewable tablet 16-32 g of dextrose  16-32 g of dextrose oral PRN Debbie Whitfield MD        Or   • dextrose 40 % oral gel 15-30 g of dextrose  15-30 g of dextrose oral PRN Debbie Whitfield MD        Or   • glucagon (GLUCAGEN) injection 1 mg  1 mg intramuscular PRN Debbie Whitfield MD        Or   • dextrose in water injection 12.5 g  25 mL intravenous PRN Debbie Whitfield MD       • melatonin capsule 5 mg  5 mg oral Nightly PRN Debbie Whitfield MD   5 mg at 01/09/21 2324   • morphine injection 1 mg  1 mg intravenous q3h PRN Reagan Haque MD       • multivitamin tablet 1 tablet  1 tablet oral Daily Debbie Whitfield MD   1 tablet at 01/12/21 0840   • ondansetron (ZOFRAN) injection 4 mg  4 mg intravenous q8h PRN Debbie Whitfield MD       • rivaroxaban  (XARELTO) tablet 15 mg  15 mg oral BID with meals Pawan, Salinas, DO   15 mg at 01/12/21 0840    Followed by   • [START ON 1/31/2021] rivaroxaban (XARELTO) tablet 20 mg  20 mg oral Daily with dinner Pawan, Salinas, DO       • senna (SENOKOT) tablet 2 tablet  2 tablet oral Nightly PRN Pawan, Salinas, DO   2 tablet at 01/09/21 4524      Review of Systems:  Unchanged except as noted in HPI    Vital signs in last 24 hours:  Temp:  [36.7 °C (98 °F)-37.6 °C (99.7 °F)] 36.9 °C (98.5 °F)  Heart Rate:  [58-67] 67  Resp:  [17-20] 20  BP: ()/(54-71) 106/71    Input/Ouput in Last 24 hours:    Intake/Output Summary (Last 24 hours) at 1/12/2021 1203  Last data filed at 1/12/2021 1000  Gross per 24 hour   Intake --   Output 850 ml   Net -850 ml     Physical Exam  General: Elderly male, NAD, requiring 2LNC  HEENT: Moist membranes, PERRL   Neck: Supple, no JVD, no tug or stridor, trach midline  Cardiac: RRR, +S1/S2, no murmur appreciated  Lungs: Normal effort, not in any acute distress  Decreased breath sounds, greatest at bases  Generally clear to auscultation bilaterally  Rare fine inspiratory bibasilar crackles   Abdomen: Soft, NT/ND, +BS, no rebound/guarding   : Deferred  Extremities: No edema, clubbing or cyanosis  Musculoskeletal: No joint deformity  Vascular: No ischemia noted  Neuro: AAOx3, nonfocal  Skin: Warm, limited exam, defer to attending/nursing  Psych: Cooperative, appropriate    Labs:    Lab Results   Component Value Date    WBC 4.93 01/12/2021    HGB 11.6 (L) 01/12/2021    HCT 34.4 (L) 01/12/2021     01/12/2021    ALT 47 01/12/2021    AST 23 01/12/2021     (L) 01/12/2021    K 4.3 01/12/2021     01/12/2021    CREATININE 0.6 (L) 01/12/2021    BUN 15 01/12/2021    CO2 24 01/12/2021    INR 1.1 01/08/2021     Imaging:   No new chest imaging to review.    ECG/Telemetry: Sinus rhythm on telemetry    IMPRESSION AND PLAN    Acute hypoxic respiratory failure, slowly improving  Not O2 dependent at  baseline. Oxygen requirements since secondary to COVID infection   - Weaned to 2 liter low flow nasal cannula today.  - Continue supplemental O2 to maintain SpO2 >= 90%  -- Wean as condition permits.  - Incentive spirometry encouraged.  - Modified proning as tolerated.  - Home O2 evaluation completed yesterday.  -- SpO2 89% at rest and 82% with ambulation on room air.  -- Requires 3 liter low nasal cannula with rest.   -- Not completed due to desaturations to mid 80s. Had to sit down.   - Overall deconditioning secondary to prolonged hospitalization is likely playing a role.  -- May benefit from SNF placement for rehab at discharge. Defer to [facility].  - Repeat home O2 eval prior to discharge.  - Incentive spirometry.     Pulmonary emboli  Right peroneal DVT  CTA with multiple right sided pulmonary emboli. No evidence of RH strain  - Likely provoked in the setting of underlying COVID19 infection.  - Cardiac biomarkers normal.  - No evidence of RHS on 2D echo.  - +DVT panel: right peroneal vein  - S/p IV Heparin. Now on Xarelto.  - Evaluated by hematology/oncology.  -- Anticipate 3-6 months of therapy.   --- Duration can be determined at outpatient follow up.      Coronavirus (COVID-19) infection  PCR positive on 12/14.  - Maintain contact and droplet precautions  - Monitor inflammatory markers intermittently  - Completed 10 day course of high dose IV Decadron.   -- Has persistent dyspnea with exertion and multiple failed home O2 evaluations.   -- ? Consider resuming PO steroids. Will discuss further with attending.   - ID following. Supportive care  - Albuterol HFA PRN   - Recommend outpatient follow up CT chest in 6-8 weeks to ensure resolution of infiltrates.  - Outpatient follow up with pulmonary at discharge.  -- Office contact information provided 1/10/21.     Mediastinal lymphadenopathy  Demonstrated on CTA chest. Likely reactive in the setting of known COVID19 infection.  - Follow up on outpatient CT chest as per  above to ensure resolution/stability.     Former Smoker  30 pack year history. Quit 26 years ago     -- Additional history per EMR/attending.     Diet: Regular consistency, 2gm potassium  DVT prophylaxis: S/p IV Heparin gtt. Now on Xarelto  Code status: Full code  Case d/w: patient

## 2021-01-12 NOTE — PROGRESS NOTES
Referral recieved and chart reviewed. Attempted to speak with patient, no answer on room phone. Will continue to reach patient.    Home oxygen ordered through [facility] [place]/[phone].  Will deliver portable tank  To patient's room prior to discharge.     14:31 update: spoke with patient, agreeable to [facility].  Reviewed calling Oxygen company at arrival home for concentrator delivery. Teachback provided.     Consuelo LAIN, RN, CHPN   /Home Care Liaison at [facility]   [address] [place] [address]   Call [phone] 24 hours a day for questions or concerns   Phone: [phone]  Pager: [phone]  E-Mail: [email or web address]

## 2021-01-12 NOTE — PATIENT CARE CONFERENCE
Care Progression Rounds Note  Date: 1/12/2021  Time: 10:55 AM     Patient Name: Nadeem Morales     Medical Record Number: [id number]   YOB: 1955  Sex: Male      Room/Bed: 4417D    Admitting Diagnosis: Shortness of breath [R06.02]  Hypoxia [R09.02]  COVID-19 virus infection [U07.1]  Pneumonia due to COVID-19 virus [U07.1, J12.82]   Admit Date/Time: 12/24/2020  8:28 PM    Primary Diagnosis: Pneumonia due to COVID-19 virus  Principal Problem: Pneumonia due to COVID-19 virus    GMLOS: 5.4  Anticipated Discharge Date: 1/13/2021    AM-PAC  Mobility Score: 24    Discharge Planning:  Living Arrangements: house  Anticipated Discharge Disposition: home without services    Barriers to Discharge:  Barriers to Discharge: Medical issues not resolved, Other (see comments)  Comment: needs home oxygen    Participants:  , nursing, social work/services

## 2021-01-12 NOTE — DISCHARGE SUMMARY
Hospital Medicine Service -  Inpatient Discharge Summary        BRIEF OVERVIEW   Admitting Provider: Debbie Whitfield MD  Attending Provider: Reagan Haque MD Attending phys phone: [phone]    PCP: Jeimy Pacheco [phone]    Admission Date: 12/24/2020  Discharge Date: 1/12/2021    Discharge To: Home     DISCHARGE DIAGNOSES      Primary Discharge Diagnosis  Pneumonia due to COVID-19 virus    Secondary Discharge Diagnoses  Active Hospital Problems    Diagnosis Date Noted   • Pneumonia due to COVID-19 virus 12/24/2020     Priority: High   • Acute respiratory failure with hypoxia (CMS/HCC) 12/25/2020     Priority: Medium   • Coronary artery disease involving native coronary artery of native heart without angina pectoris 12/24/2020     Priority: Low   • Pancytopenia (CMS/HCC) 12/24/2020     Priority: Low   • Acute pulmonary embolism (CMS/HCC) 01/09/2021      Resolved Hospital Problems    Diagnosis Date Noted Date Resolved   • Acute hyponatremia 12/24/2020 01/04/2021     Priority: Low     SUMMARY OF HOSPITALIZATION      Presenting Problem/History of Present Illness  Pneumonia due to COVID-19 virus    This is a 65 y.o. year-old male admitted on 12/24/2020 with Shortness of breath [R06.02]  Hypoxia [R09.02]  COVID-19 virus infection [U07.1]  Pneumonia due to COVID-19 virus [U07.1, J12.82].    See admit H and P and ED documentation for further details.     Hospital Course  65-year-old male with shortness of breath, acute hypoxic respiratory failure secondary to Covid pneumonia.  He had high oxygen requirements during hospitalization.  He was found to have positive acute DVT left peroneal vein and multiple right-sided pulmonary emboli.  Discussed in detail with patient, his wife and sister-in-law Michelle who is a physician assistant.  Treated with heparin drip and transition to Xarelto on discharge.  Anticipate 3 to 6 months of therapy and outpatient pulmonary follow-up for repeat imaging to ensure  resolution of infiltrates.  Home oxygen evaluation done prior to discharge and patient requiring 4 L with ambulation on 2 L at rest  See below Assessment and Plan for further details to the hospitalization. See pulmonary, infectious disease consult notes for additional details.     Assessment and Plan:  * Pneumonia due to COVID-19 virus  Assessment & Plan  Covid pneumonia with acute hypoxic respiratory failure  Chest x-ray with progressive patchy airspace opacities noted bilaterally  Not a candidate for remdesivir as symptoms started 13 days prior to admission  Decadron transitioned to the high dose protocol, completed 20 mg x5 days, currently getting 10 mg x5 days  Cultures no growth to 72 hours  Empiric antibiotics for suspected secondary bacterial infection  Rocephin transitioned to Ceftin to complete the course  Continue prone protocol  Stop trending inflammatory markers  Notified by infection control that it would be OK to remove isolation precautions on hospital day 14    Acute respiratory failure with hypoxia (CMS/HCC)  Assessment & Plan  Due to COVID-19  Prone protocol  Chest x-ray with progressive patchy airspace opacities  Pulmonary, infectious disease following  Minimal oxygen requirements at rest but drops down to mid 80's with activity even with supplemental oxygen  Continue to supplement as needed  Continue PT/OT    Pancytopenia (CMS/HCC)  Assessment & Plan  Likely related to acute infection  Monitor CBC w/daily labs  Consult heme/onc, signed off now that numbers are improving  Improving daily    Coronary artery disease involving native coronary artery of native heart without angina pectoris  Assessment & Plan  Chronic, asymptomatic  Continue Lipitor, ASA    Acute pulmonary embolism (CMS/HCC)  Assessment & Plan  CT PE on 1/8 found multiple right sided PE's  No evidence of right heart strain, Normal-sized RV. Normal RV systolic function.  Positive acute DVT left peroneal vein extending into the  tibioperoneal trunk  Heme/onc consulted, duration of treatment for embolisms caused by COVID still in flux, plan for 3-6 months treatment  Will price check xarelto and eliquis with CM  This would help explain his persistent dyspnea with even mild exertion  Xarelto            Exam on Day of Discharge  Patient seen and examined on day of discharge        Consults During Admission  IP CONSULT TO INFECTIOUS DISEASE  IP CONSULT TO HEMATOLOGY  IP CONSULT TO WOUND OSTOMY CONTINENCE  IP CONSULT TO PULMONOLOGY/SLEEP MEDICINE  IP CONSULT TO HEMATOLOGY    PROCEDURES / LABS / IMAGING      Operative Procedures  None    Other Procedures  none    Pertinent Imaging  X-ray Chest 1 View    Result Date: 12/31/2020  IMPRESSION: Interval progression in left mid to upper lobe airspace opacity when compared to prior study.  Stable right mid to lower lung zone patchy airspace opacity.    X-ray Chest 1 View    Result Date: 12/27/2020  IMPRESSION: Progressive patchy airspace opacities are noted bilaterally.     X-ray Chest 1 View    Result Date: 12/24/2020  IMPRESSION: Bilateral groundglass densities left greater than right with interval increase since prior study.    X-ray Chest 1 View    Result Date: 12/22/2020  IMPRESSION: Patchy bilateral space opacity predominantly in the lower lobes.     Ct Chest Pulmonary Embolism With Iv Contrast    Result Date: 1/8/2021  IMPRESSION: 1.  Multiple right-sided pulmonary emboli, new from the previous examination. No evidence for right heart strain. 2.  Significantly worsening airspace disease in a pattern and distribution consistent with Covid 19 pneumonia. 3. Mediastinal and hilar lymphadenopathy, several lymph nodes are slightly decreased in size. Findings of pulmonary embolism communicated to Yanni on the floor, at 10:38 pm.    Ct Chest Pulmonary Embolism With Iv Contrast    Result Date: 12/22/2020  IMPRESSION: 1.  No CT evidence of pulmonary embolus. 2.  Findings in the chest consistent with known  history of Covid pneumonia.      OUTPATIENT  FOLLOW-UP / REFERRALS / PENDING TESTS          Test Results Pending at Discharge  Unresulted Labs (From admission, onward)     Start     Ordered    01/11/21 0600  CBC  Daily     Question:  Release to patient  Answer:  Immediate   Start Status   01/13/21 0600 Scheduled   01/14/21 0600 Scheduled   01/15/21 0600 Scheduled   01/16/21 0600 Scheduled   01/17/21 0600 Scheduled       01/10/21 1004    12/30/20 1653  Sputum culture / smear Expectorated Sputum  Once     Comments: For gram stain and culture.     Question:  Release to patient  Answer:  Immediate    12/30/20 1652    12/30/20 1653  Sputum Gram Stain (Lab Only) Expectorated Sputum  PROCEDURE ONCE     Comments: For gram stain and culture.     Question:  Release to patient  Answer:  Immediate    12/30/20 1652    12/25/20 0600  Comprehensive metabolic panel  Daily     Question:  Release to patient  Answer:  Immediate   Start Status   01/13/21 0600 Scheduled   01/14/21 0600 Scheduled   01/15/21 0600 Scheduled       12/24/20 2233    12/25/20 0016  Sputum culture / smear Expectorated Sputum  Once     Question:  Release to patient  Answer:  Immediate    12/25/20 0015    12/25/20 0016  Sputum Gram Stain (Lab Only) Expectorated Sputum  PROCEDURE ONCE     Question:  Release to patient  Answer:  Immediate    12/25/20 0015                Important Issues to Address in Follow-Up  Follow up/Discharge instructions discussed with the patient and/or power of  at the time of discharge include but are not limited to:      Follow-up with pulmonary as planned  DISCHARGE MEDICATIONS   New, changed, or stopped medications from this admission:       Medication List      START taking these medications    rivaroxaban 15 mg (42)- 20 mg (9) tablets,dose pack  Start taking on: January 10, 2021  Take 15 mg by mouth 2 (two) times a day for 21 days, THEN 20 mg daily for 9 days.        CONTINUE taking these medications    aspirin 81 mg enteric  coated tablet  Take 81 mg by mouth daily.  Dose: 81 mg     atorvastatin 10 mg tablet  Commonly known as: LIPITOR  Take 10 mg by mouth daily.  Dose: 10 mg     multivitamin tablet  Take by mouth daily.        STOP taking these medications    azithromycin 250 mg tablet  Commonly known as: ZITHROMAX            Complete list of medications at discharge:    Nadeem Morales   Home Medication Instructions VITA:[id number]    Printed on:01/12/21 [id number]   Medication Information                      aspirin 81 mg enteric coated tablet  Take 81 mg by mouth daily.             atorvastatin (LIPITOR) 10 mg tablet  Take 10 mg by mouth daily.             multivitamin tablet  Take by mouth daily.             rivaroxaban 15 mg (42)- 20 mg (9) tablets,dose pack  Take 15 mg by mouth 2 (two) times a day for 21 days, THEN 20 mg daily for 9 days.                        DISCHARGE DISPOSITION      40 minutes spent on patient care and coordination of today's discharge. This includes discussing the discharge plan, discharge medications, and follow up instructions with the patient and/or power of , as well as coordinating care with nursing and consulting physicians.     Discharge to: Home    Code Status At Discharge: Full Code    Physician Order for Life-Sustaining Treatment Document Status      No documents found

## 2021-01-12 NOTE — PLAN OF CARE
Problem: Adult Inpatient Plan of Care  Goal: Plan of Care Review  Outcome: Progressing  Flowsheets (Taken 1/12/2021 1310)  Progress: improving  Plan of Care Reviewed With: patient  Outcome Summary: notifed pt was able to complete home 02 eval today, he scott need home 02 and HC set up.  Called pt's room spoke with him via phone discussed HC agency options and vendor options, he is very happy that he will be able to leave today, has no preference for HC agency, would like referral to [facility]. Notified Nurse Liaison with the referral via web page.   Patient states his spouse will provide transportation to home, explained that we have to arrange for home 02 and e-tank will be delivered to the room prior to discharge.

## 2021-01-18 ENCOUNTER — PATIENT OUTREACH (OUTPATIENT)
Dept: CASE MANAGEMENT | Facility: CLINIC | Age: 66
End: 2021-01-18

## 2021-01-18 NOTE — PROGRESS NOTES
spoke to pt is improving waiting on home care not sure how he got covid it maybe from bowling or work cm reviewed stoplight poc monitor progress      NAME: Nadeem Morales    MRN: [id number]    YOB: 1955    Event Review:       Patient stated reason for hospitalization: covid  Discharge Diagnosis: pna       Discharging Facility: [facility]  Date of Admission: 12/24/20  Date of Discharge: 01/12/21               Patient's perception of their health status since discharge: Improving    HPI:  65-year-old male with shortness of breath, acute hypoxic respiratory failure secondary to Covid pneumonia.  He had high oxygen requirements during hospitalization.  He was found to have positive acute DVT left peroneal vein and multiple right-sided pulmonary emboli.  Discussed in detail with patient, his wife and sister-in-law Michelle who is a physician assistant.  Treated with heparin drip and transition to Xarelto on discharge.  Anticipate 3 to 6 months of therapy and outpatient pulmonary follow-up for repeat imaging to ensure resolution of infiltrates.  Home oxygen evaluation done prior to discharge and patient requiring 4 L with ambulation on 2 L at rest    Medication Review:    Medication Review: Yes     Reported by: Patient  Any new medications prescribed at discharge?: Yes  Is the patient having any side effects they believe may be caused by any medication additions or changes?: No  New prescriptions filled?: Yes     Do you have enough of your regularly prescribed medications?: Yes       Medication adherence problem?: (!) Yes  Was a medication discrepancy indentified?: No          Reconciled the current and discharge medications: Yes  Reviewed AVS (Discharge Instructions)?: Yes         Acute Pain:    Acute pain: No                Chronic Pain:    Chronic pain: No                Diet/Nutrition:    Type of Diet: Regular             Home Care Services:    Home Care Agency: [facility]  Type of Home Care  Services: Home PT, Home OT, Home Nursing        Post-Discharge Durable Medical Equipment::       Oxygen Use: Yes  Oxygen Rate: 2                Home Management:    Living Arrangement: Spouse  Support System:: Spouse  Type of Residence: 2 story house        Judaism or spiritual beliefs that impact treatment?: No    Appointment Scheduling:    TCM Appointment Types: Hospital Follow-Up           Patient Scheduling Dispositions: Pt will call office to schedule     Follow-Ups:            Interventions/ Care Coordination:    Interventions/ Care Coordination: Encouraged patient to call PCP/Specialist  General Education: Respiratory precautions (flu, colds, pneumonia, COVID-19)       Reviewed signs/symptoms of worsening condition or complication that necessitate a call to the Physician's office.  Educated patient on access to care.  RN phone number given for future care management.    Estelita Silva RN

## 2021-01-25 ENCOUNTER — PATIENT OUTREACH (OUTPATIENT)
Dept: CASE MANAGEMENT | Facility: CLINIC | Age: 66
End: 2021-01-25

## 2021-01-25 NOTE — PROGRESS NOTES
spoke to pt states he is till using home 02 is feeling better cm reviewed stoplight poc monitor progress

## 2021-02-01 ENCOUNTER — PATIENT OUTREACH (OUTPATIENT)
Dept: CASE MANAGEMENT | Facility: CLINIC | Age: 66
End: 2021-02-01

## 2021-02-01 NOTE — PROGRESS NOTES
Spoke to wife states pt is doing better, still has cough on exertion using 02 as needed cm reviewed stoplight poc monitor progress

## 2021-02-15 ENCOUNTER — PATIENT OUTREACH (OUTPATIENT)
Dept: CASE MANAGEMENT | Facility: CLINIC | Age: 66
End: 2021-02-15

## 2021-03-22 ENCOUNTER — IMMUNIZATION (OUTPATIENT)
Dept: IMMUNIZATION | Facility: CLINIC | Age: 66
End: 2021-03-22

## 2021-03-23 ENCOUNTER — TRANSCRIBE ORDERS (OUTPATIENT)
Dept: SCHEDULING | Age: 66
End: 2021-03-23

## 2021-03-23 DIAGNOSIS — I26.99 OTHER PULMONARY EMBOLISM WITHOUT ACUTE COR PULMONALE: Primary | ICD-10-CM

## 2021-03-24 ENCOUNTER — HOSPITAL ENCOUNTER (OUTPATIENT)
Dept: RADIOLOGY | Facility: HOSPITAL | Age: 66
Discharge: HOME | End: 2021-03-24
Attending: INTERNAL MEDICINE
Payer: MEDICARE

## 2021-03-24 DIAGNOSIS — I26.99 OTHER PULMONARY EMBOLISM WITHOUT ACUTE COR PULMONALE: ICD-10-CM

## 2021-03-24 PROCEDURE — 71260 CT THORAX DX C+: CPT

## 2021-03-24 PROCEDURE — 63600105 HC IODINE BASED CONTRAST: Performed by: INTERNAL MEDICINE

## 2021-03-24 RX ADMIN — IOHEXOL 100 ML: 350 INJECTION, SOLUTION INTRAVENOUS at 07:46

## 2021-04-06 ENCOUNTER — TRANSCRIBE ORDERS (OUTPATIENT)
Dept: SCHEDULING | Age: 66
End: 2021-04-06

## 2021-04-06 DIAGNOSIS — I26.99 OTHER PULMONARY EMBOLISM WITHOUT ACUTE COR PULMONALE: Primary | ICD-10-CM

## 2021-04-12 ENCOUNTER — HOSPITAL ENCOUNTER (OUTPATIENT)
Dept: CARDIOLOGY | Facility: HOSPITAL | Age: 66
Discharge: HOME | End: 2021-04-12
Attending: INTERNAL MEDICINE
Payer: MEDICARE

## 2021-04-12 DIAGNOSIS — I26.99 OTHER PULMONARY EMBOLISM WITHOUT ACUTE COR PULMONALE: ICD-10-CM

## 2021-04-12 PROCEDURE — 93971 EXTREMITY STUDY: CPT | Mod: 26,LT | Performed by: SURGERY

## 2021-04-12 PROCEDURE — 93971 EXTREMITY STUDY: CPT | Mod: LT

## 2021-04-13 ENCOUNTER — IMMUNIZATION (OUTPATIENT)
Dept: IMMUNIZATION | Facility: CLINIC | Age: 66
End: 2021-04-13
Attending: FAMILY MEDICINE

## 2021-08-03 ENCOUNTER — TELEMEDICINE (OUTPATIENT)
Dept: SURGERY | Facility: CLINIC | Age: 66
End: 2021-08-03
Payer: MEDICARE

## 2021-08-03 VITALS — BODY MASS INDEX: 24.44 KG/M2 | WEIGHT: 165 LBS | HEIGHT: 69 IN

## 2021-08-03 DIAGNOSIS — Z80.0 FAMILY HISTORY OF COLON CANCER: Primary | ICD-10-CM

## 2021-08-03 PROCEDURE — 99441 PR PHYS/QHP TELEPHONE EVALUATION 5-10 MIN: CPT | Mod: 95 | Performed by: SURGERY

## 2021-08-03 RX ORDER — CHOLECALCIFEROL (VITAMIN D3) 25 MCG
1000 TABLET ORAL DAILY
COMMUNITY

## 2021-08-03 RX ORDER — LANOLIN ALCOHOL/MO/W.PET/CERES
1000 CREAM (GRAM) TOPICAL DAILY
COMMUNITY

## 2021-08-03 ASSESSMENT — ENCOUNTER SYMPTOMS
SHORTNESS OF BREATH: 0
CONSTIPATION: 0
UNEXPECTED WEIGHT CHANGE: 0
BLOOD IN STOOL: 0
BRUISES/BLEEDS EASILY: 0
DIARRHEA: 0
WHEEZING: 0
CHEST TIGHTNESS: 0
PALPITATIONS: 0
ABDOMINAL DISTENTION: 0
ACTIVITY CHANGE: 0
ABDOMINAL PAIN: 0
APPETITE CHANGE: 0
COUGH: 0

## 2021-08-03 NOTE — PROGRESS NOTES
Verification of Patient Location:  The patient affirms they are currently located in the following state:[place]     Audio Only Telemedicine Visit    Request for Consent:  You and I are about to have a telemedicine check-in or visit. This is allowed because you are already my patient, and you have requested it.  This telemedicine visit will be billed to your health insurance or you, if you are self-insured.  You understand you will be responsible for any copayments or coinsurances that apply to your telemedicine visit.  Before starting our telemedicine visit, I am required to get your consent for this virtual check-in or visit by telemedicine. Do you consent?      Patient Response to Request for Consent: Yes    The following have been reviewed and updated as appropriate in this visit:  Tobacco  Allergies  Meds  Problems  Med Hx  Surg Hx  Fam Hx  Soc Hx          Visit Documentation:  [id number]        Time Spent:  I spent 5 minutes on this date of service performing the following activities: obtaining history.    General Surgery H&P  Patient: Nadeem Morales  MRN: [id number]  1955    Visit Date: 8/3/2021  Encounter Provider: Stanley Tobias  Referring Provider: Jeimy Pacheco DO    Subjective   Consult and Colonoscopy      Nadeem Morales is a 65 y.o. male who was seen in consultation at the request of referring physician for management recommendations.  Nadeem presents to schedule colonoscopy.  This is a nonvideo telephone telemedicine visit.  Nadeem's father had colon cancer.  Nadeem denies poor appetite, unexpected weight loss, abdominal pain, change in bowel habits, melena or blood per rectum.  He has no cough, wheezing or shortness of breath and no chest pain on exertion or palpitations.  There is no history of bleeding disorder.    Medical History:   Past Medical History:   Diagnosis Date   • Coronary artery disease     Hx if acute MI, s/p stent placement   • Lipid disorder         Surgical History:   Past Surgical History:   Procedure Laterality Date   • HERNIA REPAIR     • NASAL SEPTUM SURGERY     • TONSILLECTOMY     • TRIGGER FINGER RELEASE         Social History:   Social History     Social History Narrative   • Not on file       Family History:   Family History   Problem Relation Age of Onset   • Lymphoma Biological Mother    • Colon cancer Biological Father 64       Allergies: Patient has no known allergies.    Current Medications:  •  aspirin  •  atorvastatin  •  cholecalciferol (vitamin D3)  •  cyanocobalamin  •  multivitamin  •  XARELTO    Review of Systems  Review of Systems   Constitutional: Negative for activity change, appetite change and unexpected weight change.   Respiratory: Negative for cough, chest tightness, shortness of breath and wheezing.    Cardiovascular: Negative for chest pain and palpitations.   Gastrointestinal: Negative for abdominal distention, abdominal pain, blood in stool, constipation and diarrhea.   Hematological: Does not bruise/bleed easily.       Objective     Physicial Exam  Physical Exam  Nursing note reviewed.           Labs  Lab Results   Component Value Date    WBC 4.93 01/12/2021    HGB 11.6 (L) 01/12/2021    HCT 34.4 (L) 01/12/2021    MCV 88.9 01/12/2021     01/12/2021       Lab Results   Component Value Date    GLUCOSE 77 01/12/2021    CALCIUM 8.1 (L) 01/12/2021     (L) 01/12/2021    K 4.3 01/12/2021    CO2 24 01/12/2021     01/12/2021    BUN 15 01/12/2021    CREATININE 0.6 (L) 01/12/2021       Lab Results   Component Value Date    ALT 47 01/12/2021    AST 23 01/12/2021    ALKPHOS 50 01/12/2021    BILITOT 1.0 01/12/2021         Imaging  Not applicable    Assessment       Family history of colon cancer          Adonay Silver will have colonoscopy.Nadeem expressed understanding of the indications for the procedure, the procedure which is planned, the alternatives, including doing nothing, and the risks and complications of  each.  I did answer all of his questions to his satisfaction.        Stanley Tobias MD

## 2021-08-09 ENCOUNTER — TELEPHONE (OUTPATIENT)
Dept: SURGERY | Facility: CLINIC | Age: 66
End: 2021-08-09

## 2021-08-18 ENCOUNTER — TELEPHONE (OUTPATIENT)
Dept: SURGERY | Facility: CLINIC | Age: 66
End: 2021-08-18

## 2021-08-19 ENCOUNTER — TELEPHONE (OUTPATIENT)
Dept: SURGERY | Facility: CLINIC | Age: 66
End: 2021-08-19

## 2021-08-19 NOTE — TELEPHONE ENCOUNTER
Patient called, he is having a Prostate Bx on 9/22nd.He wants to know if this would interfere with him having Colonoscopy w/Dr. Tobias on 10/1st ?

## 2021-08-26 ENCOUNTER — TELEPHONE (OUTPATIENT)
Dept: SURGERY | Facility: CLINIC | Age: 66
End: 2021-08-26

## 2021-08-26 NOTE — TELEPHONE ENCOUNTER
Patient is calling asking if you can call him back.He has questions regarding his upcoming colonoscopy. Thank you :)

## 2021-08-30 ENCOUNTER — TELEPHONE (OUTPATIENT)
Dept: SURGERY | Facility: CLINIC | Age: 66
End: 2021-08-30

## 2021-09-07 NOTE — TELEPHONE ENCOUNTER
Do you still need this message?  Have you resolved Viv' question or are you waiting for additional information?

## 2021-10-01 PROCEDURE — G0105 COLORECTAL SCRN; HI RISK IND: HCPCS | Performed by: SURGERY

## 2021-10-08 ENCOUNTER — TRANSCRIBE ORDERS (OUTPATIENT)
Dept: SCHEDULING | Age: 66
End: 2021-10-08
Payer: MEDICARE

## 2021-10-08 DIAGNOSIS — J18.9 PNEUMONIA, UNSPECIFIED ORGANISM: Primary | ICD-10-CM

## 2021-10-09 ENCOUNTER — HOSPITAL ENCOUNTER (OUTPATIENT)
Dept: RADIOLOGY | Facility: HOSPITAL | Age: 66
Discharge: HOME | End: 2021-10-09
Attending: INTERNAL MEDICINE
Payer: MEDICARE

## 2021-10-09 DIAGNOSIS — J18.9 PNEUMONIA, UNSPECIFIED ORGANISM: ICD-10-CM

## 2021-10-09 PROCEDURE — 71250 CT THORAX DX C-: CPT

## 2022-11-05 ENCOUNTER — APPOINTMENT (EMERGENCY)
Dept: RADIOLOGY | Facility: HOSPITAL | Age: 67
End: 2022-11-05
Attending: EMERGENCY MEDICINE
Payer: MEDICARE

## 2022-11-05 ENCOUNTER — HOSPITAL ENCOUNTER (EMERGENCY)
Facility: HOSPITAL | Age: 67
Discharge: HOME | End: 2022-11-05
Attending: EMERGENCY MEDICINE | Admitting: HOSPITALIST
Payer: MEDICARE

## 2022-11-05 VITALS
SYSTOLIC BLOOD PRESSURE: 136 MMHG | TEMPERATURE: 98.2 F | DIASTOLIC BLOOD PRESSURE: 76 MMHG | BODY MASS INDEX: 24.14 KG/M2 | RESPIRATION RATE: 14 BRPM | OXYGEN SATURATION: 100 % | HEIGHT: 69 IN | HEART RATE: 51 BPM | WEIGHT: 163 LBS

## 2022-11-05 DIAGNOSIS — G45.4 TRANSIENT GLOBAL AMNESIA: Primary | ICD-10-CM

## 2022-11-05 LAB
ABO + RH BLD: NORMAL
ANION GAP SERPL CALC-SCNC: 7 MEQ/L (ref 3–15)
APTT PPP: 28 SEC (ref 23–35)
BASOPHILS # BLD: 0.02 K/UL (ref 0.01–0.1)
BASOPHILS NFR BLD: 0.3 %
BILIRUB UR QL STRIP.AUTO: NEGATIVE MG/DL
BLD GP AB SCN SERPL QL: NEGATIVE
BUN SERPL-MCNC: 17 MG/DL (ref 8–20)
CALCIUM SERPL-MCNC: 9.5 MG/DL (ref 8.9–10.3)
CHLORIDE SERPL-SCNC: 102 MEQ/L (ref 98–109)
CLARITY UR REFRACT.AUTO: CLEAR
CO2 SERPL-SCNC: 27 MEQ/L (ref 22–32)
COLOR UR AUTO: YELLOW
CREAT SERPL-MCNC: 0.9 MG/DL (ref 0.8–1.3)
D AG BLD QL: POSITIVE
DIFFERENTIAL METHOD BLD: ABNORMAL
EOSINOPHIL # BLD: 0.14 K/UL (ref 0.04–0.54)
EOSINOPHIL NFR BLD: 2.1 %
ERYTHROCYTE [DISTWIDTH] IN BLOOD BY AUTOMATED COUNT: 12.4 % (ref 11.6–14.4)
GFR SERPL CREATININE-BSD FRML MDRD: >60 ML/MIN/1.73M*2
GLUCOSE BLD-MCNC: 112 MG/DL (ref 70–99)
GLUCOSE SERPL-MCNC: 104 MG/DL (ref 70–99)
GLUCOSE UR STRIP.AUTO-MCNC: NEGATIVE MG/DL
HCT VFR BLDCO AUTO: 39 % (ref 40.1–51)
HGB BLD-MCNC: 13.3 G/DL (ref 13.7–17.5)
HGB UR QL STRIP.AUTO: NEGATIVE
IMM GRANULOCYTES # BLD AUTO: 0.01 K/UL (ref 0–0.08)
IMM GRANULOCYTES NFR BLD AUTO: 0.1 %
INR PPP: 1
KETONES UR STRIP.AUTO-MCNC: ABNORMAL MG/DL
LABORATORY COMMENT REPORT: NORMAL
LEUKOCYTE ESTERASE UR QL STRIP.AUTO: NEGATIVE
LYMPHOCYTES # BLD: 2.28 K/UL (ref 1.2–3.5)
LYMPHOCYTES NFR BLD: 34 %
MCH RBC QN AUTO: 30.4 PG (ref 28–33.2)
MCHC RBC AUTO-ENTMCNC: 34.1 G/DL (ref 32.2–36.5)
MCV RBC AUTO: 89 FL (ref 83–98)
MONOCYTES # BLD: 0.55 K/UL (ref 0.3–1)
MONOCYTES NFR BLD: 8.2 %
NEUTROPHILS # BLD: 3.71 K/UL (ref 1.7–7)
NEUTS SEG NFR BLD: 55.3 %
NITRITE UR QL STRIP.AUTO: NEGATIVE
NRBC BLD-RTO: 0 %
PDW BLD AUTO: 11 FL (ref 9.4–12.4)
PH UR STRIP.AUTO: 7 [PH]
PLATELET # BLD AUTO: 160 K/UL (ref 150–350)
POCT TEST: ABNORMAL
POTASSIUM SERPL-SCNC: 4.2 MEQ/L (ref 3.6–5.1)
PROT UR QL STRIP.AUTO: NEGATIVE
PROTHROMBIN TIME: 13 SEC (ref 12.2–14.5)
RBC # BLD AUTO: 4.38 M/UL (ref 4.5–5.8)
SARS-COV-2 RNA RESP QL NAA+PROBE: NEGATIVE
SODIUM SERPL-SCNC: 136 MEQ/L (ref 136–144)
SP GR UR REFRACT.AUTO: >1.035
SPECIMEN EXP DATE BLD: NORMAL
TROPONIN I SERPL HS-MCNC: 4.9 PG/ML
UROBILINOGEN UR STRIP-ACNC: 0.2 EU/DL
WBC # BLD AUTO: 6.71 K/UL (ref 3.8–10.5)

## 2022-11-05 PROCEDURE — 70498 CT ANGIOGRAPHY NECK: CPT | Mod: ME

## 2022-11-05 PROCEDURE — 85610 PROTHROMBIN TIME: CPT | Performed by: EMERGENCY MEDICINE

## 2022-11-05 PROCEDURE — 36415 COLL VENOUS BLD VENIPUNCTURE: CPT | Performed by: EMERGENCY MEDICINE

## 2022-11-05 PROCEDURE — 93005 ELECTROCARDIOGRAM TRACING: CPT | Performed by: EMERGENCY MEDICINE

## 2022-11-05 PROCEDURE — 80048 BASIC METABOLIC PNL TOTAL CA: CPT | Performed by: EMERGENCY MEDICINE

## 2022-11-05 PROCEDURE — 63700000 HC SELF-ADMINISTRABLE DRUG: Performed by: EMERGENCY MEDICINE

## 2022-11-05 PROCEDURE — 81003 URINALYSIS AUTO W/O SCOPE: CPT | Performed by: EMERGENCY MEDICINE

## 2022-11-05 PROCEDURE — 84484 ASSAY OF TROPONIN QUANT: CPT | Performed by: EMERGENCY MEDICINE

## 2022-11-05 PROCEDURE — 85025 COMPLETE CBC W/AUTO DIFF WBC: CPT | Performed by: EMERGENCY MEDICINE

## 2022-11-05 PROCEDURE — 86901 BLOOD TYPING SEROLOGIC RH(D): CPT

## 2022-11-05 PROCEDURE — G1004 CDSM NDSC: HCPCS

## 2022-11-05 PROCEDURE — 63600105 HC IODINE BASED CONTRAST: Performed by: EMERGENCY MEDICINE

## 2022-11-05 PROCEDURE — 71045 X-RAY EXAM CHEST 1 VIEW: CPT

## 2022-11-05 PROCEDURE — 99284 EMERGENCY DEPT VISIT MOD MDM: CPT | Mod: 25

## 2022-11-05 PROCEDURE — U0003 INFECTIOUS AGENT DETECTION BY NUCLEIC ACID (DNA OR RNA); SEVERE ACUTE RESPIRATORY SYNDROME CORONAVIRUS 2 (SARS-COV-2) (CORONAVIRUS DISEASE [COVID-19]), AMPLIFIED PROBE TECHNIQUE, MAKING USE OF HIGH THROUGHPUT TECHNOLOGIES AS DESCRIBED BY CMS-2020-01-R: HCPCS | Performed by: EMERGENCY MEDICINE

## 2022-11-05 PROCEDURE — 70450 CT HEAD/BRAIN W/O DYE: CPT | Mod: ME

## 2022-11-05 PROCEDURE — 99285 EMERGENCY DEPT VISIT HI MDM: CPT | Performed by: PSYCHIATRY & NEUROLOGY

## 2022-11-05 PROCEDURE — 85730 THROMBOPLASTIN TIME PARTIAL: CPT | Performed by: EMERGENCY MEDICINE

## 2022-11-05 PROCEDURE — 70551 MRI BRAIN STEM W/O DYE: CPT | Mod: ME

## 2022-11-05 RX ORDER — NAPROXEN SODIUM 220 MG/1
324 TABLET, FILM COATED ORAL ONCE
Status: COMPLETED | OUTPATIENT
Start: 2022-11-05 | End: 2022-11-05

## 2022-11-05 RX ADMIN — ASPIRIN 81 MG CHEWABLE TABLET 324 MG: 81 TABLET CHEWABLE at 13:03

## 2022-11-05 RX ADMIN — IOHEXOL 100 ML: 350 INJECTION, SOLUTION INTRAVENOUS at 12:41

## 2022-11-05 ASSESSMENT — ENCOUNTER SYMPTOMS
EYE REDNESS: 0
SPEECH DIFFICULTY: 1
LANGUAGE SYMPTOMS: 1
WHEEZING: 0
FEVER: 0
BACK PAIN: 0
TREMORS: 0
PALPITATIONS: 0
COUGH: 0
STRIDOR: 0
TROUBLE SWALLOWING: 0
SHORTNESS OF BREATH: 0
WEAKNESS: 0
FATIGUE: 0
HEADACHES: 0
PARESTHESIAS: 0
FACIAL ASYMMETRY: 0
CONFUSION: 1
NUMBNESS: 0
WOUND: 0
SEIZURES: 0
CHILLS: 0
ABDOMINAL PAIN: 0
VOMITING: 0
DIZZINESS: 0
NECK PAIN: 0
DIAPHORESIS: 0
LIGHT-HEADEDNESS: 0

## 2022-11-05 NOTE — CONSULTS
"  Neurology Consult Note    Reason for Consultation: Acute Confusion/Transient Amnesia  Chief Complaint: \"I fell but I don't think I hit my head\"    History of Presenting Illness:  The patient is a 67 year old Right handed man with Hyperlipidemia and history of severe COVID-19 Pneumonia with respiratory failure 2 years ago, which was complicated by presumably provoked DVT/PE at that time. The patient gradually recovered from a respiratory stand point, and is not on long term anticoagulation. He presents to the ER today due to sudden acute confusion and amnesia that occurred earlier today.     History is obtained from the patient, and from his wife and daughter who are present at bedside.     The patient reports being in his usual state of health earlier today, and reportedly played '[employer]' between 9 and 11 AM. He reports having a fall during the game, because he lost his balance while chasing the ball on a play. He describes falling \"on (his) butt\" and may have hit his head into the net, but is not believed to have hit his head on the ground. The patient recalls being on the ground for about 1 minute or so, because the fall \"shook (him) up\" and caused transient disorientation. He felt acutely disoriented, dazed and unsteady for about 1 minute, but was able to get up by himself there after and continued to play for up to another hour after this event. The patient then returned home and had a shower.     Immediately after the shower, the patient began feeling confused. He recalls putting on his underwear and calling for his wife. He does not have recollection of any events/conversations that he had over the next one hour.     The patient's wife reports responding to the patient's call for help, and found the patient standing in the bathroom, appearing extremely confused. The patient asked simple questions to his wife over and over - such as what day of the week it was, what he had been doing, and how did he get " "there. The patient was unaware of current date/day/time, and was unable to recall basic details from his day to day life. He was unaware of social meetings/visits that he had planned for today afternoon, such as visiting his [employer]ian[employer]. He was only able to recall the name of one of his children by himself. In addition to the amnesia, the patient appeared very anxious and \"hyper\" and kept repeating the same statements over and over. He is described as being argumentative and difficult to re-direct. The patient had phone conversations with his daughter, which he is unable to recall at all.    The patient's family eventually contacted 911/EMS. The patient is able to recall interacting with EMS personnel at this residence, and has good recollection of all the events there after. Since his arrival to the ER, the patient's primary symptom is anxiety/concern about not being able to recall the events/symptoms immediately prior to his arrival to the ER. The patient still appears to have some cognitive and behavioral inflexibility, and acknowledges feeling physically and cognitively fatigued.     The patient has not had any similar symptoms in the past. He denies any significant neurologic history. He uses Aspirin 81 mg daily, as recommended by his outpatient providers for cardiovascular protection.     Review of Systems  All other systems reviewed and negative except as noted in the HPI.    Allergies: Patient has no known allergies.    Home Medications:  Atorvastatin 10 mg daily  Aspirin 81 mg daily      Medical History:   Past Medical History:   Diagnosis Date    Coronary artery disease     Hx if acute MI, s/p stent placement    Lipid disorder        Surgical History:   Past Surgical History:   Procedure Laterality Date    HERNIA REPAIR      NASAL SEPTUM SURGERY      TONSILLECTOMY      TRIGGER FINGER RELEASE         Social History:   Social History     Socioeconomic History    Marital status:    Tobacco Use    " Smoking status: Former    Smokeless tobacco: Never   Vaping Use    Vaping Use: Never used   Substance and Sexual Activity    Alcohol use: Yes    Drug use: Defer    Sexual activity: Yes       Family History:   Family History   Problem Relation Age of Onset    Lymphoma Biological Mother     Colon cancer Biological Father 64       Objective   Temp:  [36.8 °C (98.2 °F)] 36.8 °C (98.2 °F)  Heart Rate:  [52-58] 52  Resp:  [12-18] 12  BP: (149-154)/(78-79) 154/79  SpO2:  [99 %] 99 %  Oxygen Therapy: None (Room air)    NIHSS Score: 0    Physical Exam:  General Appearance: Elderly man with age appropriate appearance without any acute clinical distress  Cardiac: RRR, no obvious murmurs, no peripheral edema  Skin: Warm    Neurologic Exam:  Head/Neck: No scalp tenderness on palpation. No nuchal rigidity.   Mental Status:  - Level of Consciousness: Well Awake  - Alertness: Fairly alert in response to voice with appropriately timed responses  - Attention: Mildly impaired sustained cognitive attention - Able to spell CORY backwards  - Thought Process: Circumstantial; Perseverative; Inflexible.  - Thought Content: No acute delusions or hallucinations noted, BUT with perseverative fixation on the ideas of having fallen earlier today. The patient repetitively makes the same specific statements over and over in describing his fall from earlier today.   - Orientation: Intact to person/self; Intact to time (date/day/month/year); Intact to location; Intact to situation partially appropriately  - Neglect - No unilateral visual or spatial neglect noted on exam  - Mood/Affect - Mildly labile, dysthymic appearing affect  - Behavior/Cooperation - Mild to Moderately anxious in his appearance, but responds to counseling. Cooperative with exam.     - Language: Speech is fluent with normal prosody  1. Naming - Names all simple objects to confrontation. Able to name parts objects.   2. Comprehension - Able to follow simple commands well;  Able to follow complex 3 step commands crossing Right-Left without any difficulties  3. Repetition - Able to repeat No ifs, ands or buts and other complex sentences without difficulty  - Calculations: Able to perform simple calculations - Able to calculate quarters in $1.75 correctly  - No Ideomotor apraxia    Cranial Nerves:  CN II: Visual Fields: Intact to confrontation in all quadrants bilaterally without any neglect on double simultaneous stimulation; Pupils are equal, round and reactive to light bilaterally  CN III/IV/VI: Extraocular movements are intact bilaterally in all directions without any nystagmus; Normal smooth pursuit; Mild ptosis on the Right (This is reportedly baseline/chronic)  CN V: Normal facial sensation in V1, V2 and V3 distributions bilaterally; Good bite strength bilaterally  CN VII: Normal symmetric facial movement with smiling and with speech (Family confirms current facial appearance to be the patient's normal/baseline).  CN IX/X: Symmetric palate elevation, No uvular deviation, No dysarthria noted, Gag/Cough intact  CN XI: Normal/strong head turning bilaterally; Normal 5/5 shoulder shrug bilaterally  CN XII: Normal/midline tongue protrusion    Motor:  Bulk: No focal atrophy noted on visual inspection of the extremities  Tone: Normal tone without any spasticity or hypotonia noted on exam  No abnormal movements  No pronator drifts    Strength:  Action/Muscle  Shoulder abduction/Deltoids - 5/5 bilaterally  Elbow Flexion/Biceps - 5/5 bilaterally  Elbow Extension/Triceps - 5/5 bilaterally  Wrist Flexion - 5/5 bilaterally  Wrist Extension - 5/5 bilaterally   strength/Interosseous - 5/5 bilaterally    Hip Flexion/Iliopsoas - 5/5 bilaterally  Hip Abduction - 5/5 bilaterally  Hip Adduction - 5/5 bilaterally  Knee Flexion/Hamstrings - 5/5 bilaterally  Knee Extension/Quadriceps - 5/5 bilaterally  Ankle Dorsiflexion/Tibialis Ant - 5/5 bilaterally  Ankle Planter flexion/Gastroc - 5/5  bilaterally    Sensory:  Sensation intact to light touch in both upper and lower extremities bilaterally    Reflexes:  Reflex Name: Right Left  Biceps                2+   2+  Brachioradialis   2+   2+  Knee Jerk          2+   2+  Ankle Jerk         2+   2+  Babinski     Absent   Absent  Clonus          None   None    Coordination:  No truncal ataxia  No dysmetria on Finger to nose bilaterally    Gait:  No significant ataxia or imbalance is noted              Labs: I have reviewed the following lab results:  Admission on 11/05/2022   Component Date Value    Ventricular rate 11/05/2022 57     Atrial rate 11/05/2022 57     NE Interval 11/05/2022 172     QRS duration 11/05/2022 104     QT Interval 11/05/2022 412     QTC Calculation(Bazett) 11/05/2022 401     P Axis 11/05/2022 16     R Axis 11/05/2022 5     T Wave Axis 11/05/2022 6     Sodium 11/05/2022 136     Potassium 11/05/2022 4.2     Chloride 11/05/2022 102     CO2 11/05/2022 27     BUN 11/05/2022 17     Creatinine 11/05/2022 0.9     Glucose 11/05/2022 104 (H)     Calcium 11/05/2022 9.5     eGFR 11/05/2022 >60.0     Anion Gap 11/05/2022 7     WBC 11/05/2022 6.71     RBC 11/05/2022 4.38 (L)     Hemoglobin 11/05/2022 13.3 (L)     Hematocrit 11/05/2022 39.0 (L)     MCV 11/05/2022 89.0     MCH 11/05/2022 30.4     MCHC 11/05/2022 34.1     RDW 11/05/2022 12.4     Platelets 11/05/2022 160     MPV 11/05/2022 11.0     Differential Type 11/05/2022 Auto     nRBC 11/05/2022 0.0     Immature Granulocytes 11/05/2022 0.1     Neutrophils 11/05/2022 55.3     Lymphocytes 11/05/2022 34.0     Monocytes 11/05/2022 8.2     Eosinophils 11/05/2022 2.1     Basophils 11/05/2022 0.3     Immature Granulocytes, A* 11/05/2022 0.01     Neutrophils, Absolute 11/05/2022 3.71     Lymphocytes, Absolute 11/05/2022 2.28     Monocytes, Absolute 11/05/2022 0.55     Eosinophils, Absolute 11/05/2022 0.14     Basophils, Absolute 11/05/2022 0.02     High Sens  Troponin I 11/05/2022 4.9     PT 11/05/2022 13.0     INR 11/05/2022 1.0     PTT 11/05/2022 28     Specimen Expiration 11/05/2022 11/08/2022     Antibody Screen 11/05/2022 Negative     ABO 11/05/2022 O     Rh Factor 11/05/2022 Positive     History Check 11/05/2022 No type on file     SARS-CoV-2 (COVID-19) 11/05/2022 Negative     POCT Bedside Glucose 11/05/2022 112 (H)     POC Test 11/05/2022 POC        Imaging: I have personally reviewed the images for the NeuroImaging studies listed below. My interpretation is noted as following:     CT Head without contrast: 11/5/2022   - No acute intracranial abnormalities    CT Angiography Head/Neck with contrast: 11/5/2022   - No significant intracranial large vessel abnormalities are noted.   - Mild atherosclerotic disease in bilateral Carotid bulbs, resulting in minor stenosis in the proximal Carotid on the Right.         Impression/Assessment:  1. The patient's current clinical presentation suggests Transient Global Amnesia, potentially a manifestation of, or associated with, Mild Acute Concussive injury.     2. Otherwise, the patient's current neurologic examination does not reveal any findings/deficits to strongly suspect Acute Cerebral Ischemia/Stroke/TIA or any other acute intracranial abnormality.     Recommendations:  1. No further work up is acutely necessary from a neurologic stand point.   2. Will recommend supportive measures typically advised after a Mild Concussive injury, including physical and cognitive rest over the next 24 to 48 hours, followed by gradual resumption of usual day to day activities over a few days there after, as tolerated by the patient.   3. Will recommend discharge home if the patient continues to rapidly improve over the next few hours today afternoon. There after, the patient should follow up with Primary Care in 5 to 7 days to evaluate for any residual/lingering symptoms. In case of any significant residual symptoms beyond 1  week from today, the patient can be referred to the Neurology Clinic. Otherwise, if the patient recovers back to his baseline within the next few days, then no further outpatient Neurology Clinic follow up is necessary.   4. Will recommend continuing prior to admission daily low dose Aspirin therapy for long term Vascular prevention, unless any contraindications arise in the future.     The patient and his family members (Wife and Daughter) were counseled extensively on the current neurologic findings, impression and recommendations noted above. Supportive treatment and preventive measures were discussed in detail. The patient should return to the ER in case of any new or worsening neurologic deficits, but is otherwise recommended to follow up with Primary Care as previously noted. All questions are answered.     The patient's case/recommendations have been discussed with Dr. Alegria/ER team in detail today.

## 2022-11-05 NOTE — ED PROVIDER NOTES
Emergency Medicine Note  HPI   HISTORY OF PRESENT ILLNESS     67-year-old male presents emergency room for evaluation of altered mental status has been present for the past hour or so as per the medics.  Patient reportedly walked out of the shower and saw his wife and could not remember the events from earlier in the day and had significant repetitive speech.  He kept saying the same thing over and over again.  On arrival to the ER he is some mild word finding difficulty.  No facial droop.  No arm or leg weakness or paresthesias.  Patient states he was playing pickle ball earlier today and did have vomiting does not think he hit his head.  Stroke alert activated on arrival.  Patient states he used to be on Xarelto for a blood clot in his lungs but no longer on it.      Stroke  Presenting symptoms: confusion and language symptoms    Presenting symptoms: no headaches and no weakness    Onset quality:  Sudden  Last known well:  1130AM  Duration:  1 hour  Timing:  Constant  Progression:  Unchanged  Associated symptoms: fall    Associated symptoms: no chest pain, no difficulty swallowing, no dizziness, no fever, no neck pain, no paresthesias, no seizures and no vomiting          Patient History   PAST HISTORY     Reviewed from Nursing Triage:  Allergies  Meds       Past Medical History:   Diagnosis Date    Coronary artery disease     Hx if acute MI, s/p stent placement    Lipid disorder     Myocardial infarction (CMS/HCC)        Past Surgical History:   Procedure Laterality Date    CORONARY ANGIOPLASTY WITH STENT PLACEMENT      HERNIA REPAIR      NASAL SEPTUM SURGERY      TONSILLECTOMY      TRIGGER FINGER RELEASE         Family History   Problem Relation Age of Onset    Lymphoma Biological Mother     Colon cancer Biological Father 64       Social History     Tobacco Use    Smoking status: Former    Smokeless tobacco: Never   Vaping Use    Vaping Use: Never used   Substance Use Topics    Alcohol use: Yes     Drug use: Defer         Review of Systems   REVIEW OF SYSTEMS     Review of Systems   Constitutional: Negative for chills, diaphoresis, fatigue and fever.   HENT: Negative for congestion and trouble swallowing.    Eyes: Negative for redness.   Respiratory: Negative for cough, shortness of breath, wheezing and stridor.    Cardiovascular: Negative for chest pain and palpitations.   Gastrointestinal: Negative for abdominal pain and vomiting.   Musculoskeletal: Negative for back pain and neck pain.   Skin: Negative for pallor, rash and wound.   Neurological: Positive for speech difficulty. Negative for dizziness, tremors, seizures, syncope, facial asymmetry, weakness, light-headedness, numbness, headaches and paresthesias.   Psychiatric/Behavioral: Positive for confusion.         VITALS     ED Vitals    Date/Time Temp Pulse Resp BP SpO2 [facility]   11/05/22 1700 -- 51 14 136/76 100 % LMS   11/05/22 1545 -- 55 21 128/81 100 % LMS   11/05/22 1400 -- 52 12 154/79 99 % LMS   11/05/22 1345 -- 58 15 -- 99 % LMS   11/05/22 1251 36.8 °C (98.2 °F) 57 18 149/78 99 % LMS        Pulse Ox %: 100 % (11/05/22 1309)  Pulse Ox Interpretation: Normal (11/05/22 1309)  Heart Rate: 59 (11/05/22 1309)  Rhythm Strip Interpretation: Sinus Bradycardia (11/05/22 1309)     Physical Exam   PHYSICAL EXAM     Physical Exam  Constitutional:       General: He is in acute distress.      Appearance: He is not ill-appearing, toxic-appearing or diaphoretic.   HENT:      Head: Atraumatic.      Mouth/Throat:      Mouth: Mucous membranes are moist.   Eyes:      Extraocular Movements: Extraocular movements intact.      Conjunctiva/sclera: Conjunctivae normal.      Pupils: Pupils are equal, round, and reactive to light.   Cardiovascular:      Rate and Rhythm: Normal rate and regular rhythm.      Pulses: Normal pulses.   Pulmonary:      Effort: No respiratory distress.      Breath sounds: No stridor. No wheezing, rhonchi or rales.   Abdominal:      General: There  is no distension.      Palpations: There is no mass.      Tenderness: There is no abdominal tenderness. There is no guarding.      Hernia: No hernia is present.   Musculoskeletal:         General: No tenderness. Normal range of motion.      Cervical back: Normal range of motion and neck supple.   Skin:     General: Skin is warm.      Capillary Refill: Capillary refill takes less than 2 seconds.      Coloration: Skin is not jaundiced.   Neurological:      General: No focal deficit present.      Mental Status: He is alert and oriented to person, place, and time.      Cranial Nerves: No cranial nerve deficit.      Sensory: No sensory deficit.      Motor: No weakness.      Coordination: Coordination normal.      Comments: Mild repetitive speech with some slight word finding difficulty although oriented x3 and able to follow commands without any dysarthria.           PROCEDURES     Critical Care  Performed by: Jean Alegria DO  Authorized by: Jean Alegria DO     Critical care provider statement:     Critical care time (minutes):  30    Critical care was necessary to treat or prevent imminent or life-threatening deterioration of the following conditions:  CNS failure or compromise    Critical care was time spent personally by me on the following activities:  Ordering and performing treatments and interventions, ordering and review of laboratory studies, ordering and review of radiographic studies, pulse oximetry, re-evaluation of patient's condition, development of treatment plan with patient or surrogate, discussions with consultants, evaluation of patient's response to treatment, examination of patient and obtaining history from patient or surrogate         DATA     Results     Procedure Component Value Units Date/Time    Urinalysis with Reflex Culture [[id number]]  (Abnormal) Collected: 11/05/22 1427    Specimen: Urine, Clean Catch Updated: 11/05/22 6347    Narrative:      The following orders were created for  panel order Urinalysis with Reflex Culture.  Procedure                               Abnormality         Status                     ---------                               -----------         ------                     UA Reflex to Culture (Ma...[[id number]]  Abnormal            Final result                 Please view results for these tests on the individual orders.    UA Reflex to Culture (Macroscopic) [[id number]]  (Abnormal) Collected: 11/05/22 1427    Specimen: Urine, Clean Catch Updated: 11/05/22 1435     Color, Urine Yellow     Clarity, Urine Clear     Specific Gravity, Urine >1.035     pH, Urine 7.0     Leukocyte Esterase Negative     Comment: Results can be falsely negative due to high specific gravity, some antibiotics, glucose >3 g/dl, or WBC other than neutrophils.        Nitrite, Urine Negative     Protein, Urine Negative     Glucose, Urine Negative mg/dL      Ketones, Urine Trace mg/dL      Comment: Free sulfhydryl drugs such as Mesna, Capoten, and Acetylcysteine (Mucomyst) may cause false positive ketonuria.        Urobilinogen, Urine 0.2 EU/dL      Bilirubin, Urine Negative mg/dL      Blood, Urine Negative     Comment: The sensitivity of the occult blood test is equivalent to approximately 4 intact RBC/HPF.       Type and Screen [[id number]] Collected: 11/05/22 1256    Specimen: Blood, Venous Updated: 11/05/22 1351     Specimen Expiration 11/08/2022     Antibody Screen Negative     ABO O     Rh Factor Positive     History Check No type on file    SARS-CoV-2 (COVID-19), PCR Nasopharynx [[id number]]  (Normal) Collected: 11/05/22 1257    Specimen: Nasopharyngeal Wash from Nasopharynx Updated: 11/05/22 1341    Narrative:      The following orders were created for panel order SARS-CoV-2 (COVID-19), PCR Nasopharynx.  Procedure                               Abnormality         Status                     ---------                               -----------         ------                     SARS-CoV-2  (COVID-19), P...[[id number]]  Normal              Final result                 Please view results for these tests on the individual orders.    SARS-CoV-2 (COVID-19), PCR Nasopharynx [[id number]]  (Normal) Collected: 11/05/22 1257    Specimen: Nasopharyngeal Wash from Nasopharynx Updated: 11/05/22 1341     SARS-CoV-2 (COVID-19) Negative    Narrative:      Testing performed using real-time PCR for detection of COVID-19. EUA approved validation studies performed on site.     HS Troponin I (with 2 hour reflex) [[id number]]  (Normal) Collected: 11/05/22 1236    Specimen: Blood, Venous Updated: 11/05/22 1329     High Sens Troponin I 4.9 pg/mL     Basic metabolic panel [[id number]]  (Abnormal) Collected: 11/05/22 1236    Specimen: Blood, Venous Updated: 11/05/22 1302     Sodium 136 mEQ/L      Potassium 4.2 mEQ/L      Comment: Results obtained on plasma. Plasma Potassium values may be up to 0.4 mEQ/L less than serum values. The differences may be greater for patients with high platelet or white cell counts.        Chloride 102 mEQ/L      CO2 27 mEQ/L      BUN 17 mg/dL      Creatinine 0.9 mg/dL      Glucose 104 mg/dL      Calcium 9.5 mg/dL      eGFR >60.0 mL/min/1.73m*2      Anion Gap 7 mEQ/L     Protime-INR [[id number]]  (Normal) Collected: 11/05/22 1236    Specimen: Blood, Venous Updated: 11/05/22 1302     PT 13.0 sec      INR 1.0     Comment: INR has no defined significance when PT is within Reference Range.       PTT [[id number]]  (Normal) Collected: 11/05/22 1236    Specimen: Blood, Venous Updated: 11/05/22 1302     PTT 28 sec     CBC and Differential [[id number]]  (Abnormal) Collected: 11/05/22 1236    Specimen: Blood, Venous Updated: 11/05/22 1245     WBC 6.71 K/uL      RBC 4.38 M/uL      Hemoglobin 13.3 g/dL      Hematocrit 39.0 %      MCV 89.0 fL      MCH 30.4 pg      MCHC 34.1 g/dL      RDW 12.4 %      Platelets 160 K/uL      MPV 11.0 fL      Differential Type Auto     nRBC 0.0 %      Immature Granulocytes 0.1 %       Neutrophils 55.3 %      Lymphocytes 34.0 %      Monocytes 8.2 %      Eosinophils 2.1 %      Basophils 0.3 %      Immature Granulocytes, Absolute 0.01 K/uL      Neutrophils, Absolute 3.71 K/uL      Lymphocytes, Absolute 2.28 K/uL      Monocytes, Absolute 0.55 K/uL      Eosinophils, Absolute 0.14 K/uL      Basophils, Absolute 0.02 K/uL           Imaging Results          MRI BRAIN WITHOUT CONTRAST (Final result)  Result time 11/05/22 17:02:20    Final result                 Impression:    IMPRESSION:  1.  No acute intracranial infarct, hemorrhage, or mass effect.  2.  Moderate microangiopathic and involutional changes.             Narrative:    CLINICAL HISTORY: Neurological deficit. Memory and speech issues.    COMPARISON: CT head stroke alert dated 11/5/2022.    Technique: MR images of the brain was performed without administration of  intravenous contrast.    FINDINGS:  Ventricles and cortical sulci are prominent, consistent with additional changes.  There are few scattered punctate foci of T2/FLAIR hyperintense signal  abnormality which is a nonspecific finding but can be associated with mild  microangiopathic changes.No cerebral edema, mass effect or midline shift. No  diffusion evidence of acute infarct. No parenchymal hemorrhage on gradient echo  imaging. No extra-axial collection.    Cerebellar tonsils are normal in location. Major vessel flow voids are preserved  at the skull base. Bone marrow signal is within normal limits. No fluid  opacification of the paranasal sinuses or mastoids.                               X-RAY CHEST 1 VIEW (Final result)  Result time 11/05/22 14:45:29    Final result                 Impression:    IMPRESSION: Likely no acute pulmonary process.  Areas of scarring both lungs.                     Narrative:    CLINICAL HISTORY:  confusion, abnormal lung findings on brain imaging    TECHNIQUE: Frontal chest radiograph.    COMPARISON: CT chest 10/9/2021    FINDINGS:  Areas of scarring  both lungs, left greater than right. Findings are similar to  prior. No elba consolidation.  No pneumothorax. No significant vascular congestion.                                   CT ANGIOGRAPHY HEAD/NECK WITH AND WITHOUT IV CONTRAST (Final result)  Result time 11/05/22 13:16:00    Final result                 Impression:    IMPRESSION:  1.  No evidence of a large vessel occlusion in the major arterial vessels of the  head and neck.  2.  Atherosclerotic vascular calcifications are noted in the right carotid bulb  without hemodynamically significant stenosis. Atherosclerotic changes are also  noted in the right intracranial internal carotid artery without hemodynamically  significant stenosis.  3.  No intracranial aneurysm.  4.  There are patchy airspace opacities with reticulation, scarring, as well as  paraseptal emphysematous changes predominantly in the visualized left lung  parenchyma. Consider correlation with CT chest imaging for further  characterization.             Narrative:    CLINICAL HISTORY:  Neuro deficit, acute, stroke suspected. New confusion and  speech difficulties.    COMMENT:    COMPARISON: CT head dated 11/5/2022.    TECHNIQUE:  Thin section CT angiography from the aortic arch to vertex was  performed using a multislice CT scanner, following the intravenous  administration of contrast (100 mL of Omnipaque 350).  MIP and volume rendered  reformats were obtained using a [employer] workstation. Review of the axial source  data and the reconstructed images was performed. CT DOSE:  One or more dose  reduction techniques (e.g. automated exposure control, adjustment of the mA  and/or kV according to patient size, use of iterative reconstruction technique)  was utilized for this examination. This exam was analyzed using [email or web address] contaCT  LVO.    FINDINGS:    CTA NECK:  Aortic Arch: Thoracic aorta is normal in size and caliber. There is a  four-vessel arch with the left vertebral artery originating from the  aortic arch  which is a normal anatomic variant.  Origin of Great Vessels: No significant stenosis    Right Carotid Artery: Patent  Level of bifurcation: C4  Internal Carotid Artery Stenosis: There is approximately 37% stenosis by NASCET  criteria in the right carotid bulb and proximal internal carotid artery.  Plaque characterization: Trace calcified and noncalcified atheromatous plaque is  noted in the right carotid bulb.    Left Carotid Artery: Patent  Level of bifurcation: C4  Internal Carotid Artery Stenosis: No significant stenosis by NASCET criteria.  Plaque characterization: No calcified or noncalcified atheromatous plaque.    Vertebral Arteries: Patent.  Characteristics: Right vertebral artery slightly more dominant in comparison the  left vertebral artery. Bilateral vertebral arteries are patent and normal in  course and caliber with extension intracranially.    ---------------------------------------------------------------------------    CTA HEAD:  Anterior Circulation:  Right ICA: Patent  Petrous & Cavernous segments: No evidence of significant stenosis or aneurysm.  Trace calcified atheromatous plaque is noted.  Ophthalmic: No evidence of significant stenosis or aneurysm. Trace calcified  atheromatous plaque is noted.  Terminal ICA: No evidence of significant stenosis or aneurysm.    A1 segment: No evidence of significant stenosis.  M1 segment: No evidence of significant stenosis.  Middle cerebral artery trunk: No evidence of significant stenosis or aneurysm.  Anterior communicating artery: No evidence of aneurysm.  Posterior communicating artery: No evidence of aneurysm.    Left ICA:  Petrous & Cavernous segments: No evidence of significant stenosis or aneurysm.  Ophthalmic: No evidence of significant stenosis or aneurysm.  Terminal ICA: No evidence of significant stenosis or aneurysm.    A1 segment: No evidence of significant stenosis.  M1 segment: No evidence of significant stenosis.  Middle  cerebral artery trunk: No evidence of significant stenosis or aneurysm.  Anterior communicating artery: No evidence of aneurysm.  Posterior communicating artery: Hypoplastic but patent. No aneurysm.    Posterior circulation:  Right distal vertebral artery: No evidence of significant stenosis.  Left distal vertebral artery: No evidence of significant stenosis.    Basilar trunk: No evidence of significant stenosis.  Basilar terminus: Normal.    Right posterior cerebral artery (PCA): No evidence of significant stenosis.  Left posterior cerebral artery (PCA): No evidence of significant stenosis.    Incidental Findings: Abnormal appearance of the partially visualized left lung  parenchyma with reticulation, scarring, and bronchiectasis. Paraseptal and  centrilobular emphysematous changes are also noted. No mediastinal  lymphadenopathy.    CAROTID IMAGING CRITERIA:  Measurement of carotid stenosis is based on  parameters that correlate the residual internal carotid lumen diameter with  NASCET-based stenosis levels.                               CT HEAD STROKE ALERT WITHOUT IV CONTRAST (Final result)  Result time 11/05/22 12:51:53    Final result                 Impression:    IMPRESSION:  1.  No acute intracranial hemorrhage, large vascular territorial infarct, or  mass effect.  2.  Microangiopathic and involutional changes are noted. If there is persistent  clinical concern for acute intracranial infarct, consider correlation with MRI  brain examination without contrast.    Finding:    Stroke alert   Acuity: Critical  Status:  CLOSED    Critical read back was performed and results were read back by Dr. Jean Alegria,  on 11/5/2022 12:49 PM at [facility] emergency department.               Narrative:    CLINICAL HISTORY: Acute neurological deficit. Clinical suspicion for stroke.  Patient presents with sudden onset of confusion and speech difficulties.    STUDY: CT examination of the head was performed without the  administration of  intravenous contrast. Sagittal and coronal reformations  were rendered from  axial source images, and all images were reviewed in bone and soft tissue  windows.    CT DOSE:  One or more dose reduction techniques (e.g. automated exposure  control, adjustment of the mA and/or kV according to patient size, use of  iterative reconstruction technique) utilized for this examination.    COMPARISON: No prior studies are available for comparison.    COMMENT: Brain parenchyma demonstrates maintenance of gray-white matter  differentiation.  No acute intracranial parenchymal hemorrhage, mass effect,  midline shift, or extra-axial fluid collection.  No large vascular territorial  infarct.  Ventricles, basal cisterns and cortical sulci are prominent,  consistent with involutional changes. Hypodensity in the periventricular and  subcortical white matter, consistent with microangiopathic changes.  Atherosclerotic vascular calcifications are noted.    Visualized paranasal sinuses and mastoid air cells are clear bilaterally. The  calvarium is unremarkable. Orbits are within normal limits. Calvarium is intact.                                ECG 12 lead   ED Interpretation   Sinus 57 bpm, no ishcemia, nml qrs/qtc          Scoring tools                                  ED Course & MDM   MDM / ED COURSE / CLINICAL IMPRESSION / DISPO     MDM    ED Course as of 11/05/22 1729   Sat Nov 05, 2022   1253 Case dw dr Zamora, neurology [MR]   1301 NIH zero on my reassessment on return from CAT scan.  tPA not indicated. [MR]   1319 No LVO, no intervention [MR]   1325 Neurology in emergency room evaluating patient. [MR]   1420 Neuro feels pt can be discharged based on his exam [MR]   1530 Case dw family at bedside. Concerned as pt still a bit forgetful. Will get MRI to r/o cva [MR]      ED Course User Index  [MR] Jean Alegria, DO     Clinical Impression      Transient global amnesia     _________________     ED Disposition    Discharge                   Rejanna, Jean WAHL, DO  11/05/22 [id number]

## 2022-11-06 LAB
ATRIAL RATE: 57
P AXIS: 16
PR INTERVAL: 172
QRS DURATION: 104
QT INTERVAL: 412
QTC CALCULATION(BAZETT): 401
R AXIS: 5
T WAVE AXIS: 6
VENTRICULAR RATE: 57

## 2022-11-06 PROCEDURE — 93010 ELECTROCARDIOGRAM REPORT: CPT | Performed by: INTERNAL MEDICINE

## 2023-02-01 ENCOUNTER — APPOINTMENT (OUTPATIENT)
Dept: URBAN - METROPOLITAN AREA CLINIC 203 | Age: 68
Setting detail: DERMATOLOGY
End: 2023-02-08

## 2023-02-01 DIAGNOSIS — L81.4 OTHER MELANIN HYPERPIGMENTATION: ICD-10-CM

## 2023-02-01 DIAGNOSIS — D18.0 HEMANGIOMA: ICD-10-CM

## 2023-02-01 DIAGNOSIS — Z71.89 OTHER SPECIFIED COUNSELING: ICD-10-CM

## 2023-02-01 DIAGNOSIS — D22 MELANOCYTIC NEVI: ICD-10-CM

## 2023-02-01 DIAGNOSIS — L57.0 ACTINIC KERATOSIS: ICD-10-CM

## 2023-02-01 DIAGNOSIS — L57.8 OTHER SKIN CHANGES DUE TO CHRONIC EXPOSURE TO NONIONIZING RADIATION: ICD-10-CM

## 2023-02-01 PROBLEM — D22.5 MELANOCYTIC NEVI OF TRUNK: Status: ACTIVE | Noted: 2023-02-01

## 2023-02-01 PROBLEM — D18.01 HEMANGIOMA OF SKIN AND SUBCUTANEOUS TISSUE: Status: ACTIVE | Noted: 2023-02-01

## 2023-02-01 PROBLEM — D22.62 MELANOCYTIC NEVI OF LEFT UPPER LIMB, INCLUDING SHOULDER: Status: ACTIVE | Noted: 2023-02-01

## 2023-02-01 PROCEDURE — OTHER PRESCRIPTION MEDICATION MANAGEMENT: OTHER

## 2023-02-01 PROCEDURE — OTHER DEFER: OTHER

## 2023-02-01 PROCEDURE — 17004 DESTROY PREMAL LESIONS 15/>: CPT

## 2023-02-01 PROCEDURE — 99203 OFFICE O/P NEW LOW 30 MIN: CPT | Mod: 25

## 2023-02-01 PROCEDURE — OTHER COUNSELING: OTHER

## 2023-02-01 PROCEDURE — OTHER LIQUID NITROGEN: OTHER

## 2023-02-01 PROCEDURE — OTHER SUNSCREEN RECOMMENDATIONS: OTHER

## 2023-02-01 ASSESSMENT — LOCATION SIMPLE DESCRIPTION DERM
LOCATION SIMPLE: LEFT SCALP
LOCATION SIMPLE: ABDOMEN
LOCATION SIMPLE: RIGHT FOREHEAD
LOCATION SIMPLE: LEFT UPPER ARM
LOCATION SIMPLE: LEFT FOREHEAD
LOCATION SIMPLE: RIGHT SCALP
LOCATION SIMPLE: LEFT SHOULDER
LOCATION SIMPLE: RIGHT ZYGOMA
LOCATION SIMPLE: RIGHT SHOULDER
LOCATION SIMPLE: POSTERIOR SCALP
LOCATION SIMPLE: RIGHT CHEEK
LOCATION SIMPLE: CHEST
LOCATION SIMPLE: SCALP

## 2023-02-01 ASSESSMENT — LOCATION DETAILED DESCRIPTION DERM
LOCATION DETAILED: LEFT LATERAL FRONTAL SCALP
LOCATION DETAILED: LEFT MEDIAL INFERIOR CHEST
LOCATION DETAILED: LEFT SUPERIOR FOREHEAD
LOCATION DETAILED: RIGHT MEDIAL FRONTAL SCALP
LOCATION DETAILED: RIGHT FOREHEAD
LOCATION DETAILED: LEFT POSTERIOR SHOULDER
LOCATION DETAILED: LEFT ANTERIOR PROXIMAL UPPER ARM
LOCATION DETAILED: RIGHT INFERIOR LATERAL MALAR CHEEK
LOCATION DETAILED: RIGHT POSTERIOR SHOULDER
LOCATION DETAILED: RIGHT SUPERIOR PARIETAL SCALP
LOCATION DETAILED: LEFT SUPERIOR PARIETAL SCALP
LOCATION DETAILED: LEFT CENTRAL PARIETAL SCALP
LOCATION DETAILED: MID-OCCIPITAL SCALP
LOCATION DETAILED: EPIGASTRIC SKIN
LOCATION DETAILED: RIGHT CENTRAL ZYGOMA
LOCATION DETAILED: LEFT MEDIAL FRONTAL SCALP

## 2023-02-01 ASSESSMENT — LOCATION ZONE DERM
LOCATION ZONE: TRUNK
LOCATION ZONE: SCALP
LOCATION ZONE: FACE
LOCATION ZONE: ARM

## 2023-02-01 ASSESSMENT — PAIN INTENSITY VAS: HOW INTENSE IS YOUR PAIN 0 BEING NO PAIN, 10 BEING THE MOST SEVERE PAIN POSSIBLE?: NO PAIN

## 2023-02-01 NOTE — PROCEDURE: LIQUID NITROGEN
Post-Care Instructions: I reviewed with the patient in detail post-care instructions. Patient is to wear sunprotection, and avoid picking at any of the treated lesions. Pt may apply Vaseline to crusted or scabbing areas.
Duration Of Freeze Thaw-Cycle (Seconds): 0
Number Of Freeze-Thaw Cycles: 2 freeze-thaw cycles
Render Note In Bullet Format When Appropriate: No
Show Aperture Variable?: Yes
Detail Level: Detailed
Consent: The patient's consent was obtained including but not limited to risks of crusting, scabbing, blistering, scarring, darker or lighter pigmentary change, recurrence, incomplete removal and infection.

## 2023-02-01 NOTE — PROCEDURE: PRESCRIPTION MEDICATION MANAGEMENT
Render In Strict Bullet Format?: No
Detail Level: Zone
Plan: Discussed BluU and cryotherapy. \\nPatient is interested in BluU
Samples Given: Ak handout

## 2023-02-20 ENCOUNTER — HOSPITAL ENCOUNTER (EMERGENCY)
Facility: HOSPITAL | Age: 68
Discharge: HOME | End: 2023-02-20
Attending: EMERGENCY MEDICINE
Payer: MEDICARE

## 2023-02-20 VITALS
DIASTOLIC BLOOD PRESSURE: 70 MMHG | HEIGHT: 69 IN | BODY MASS INDEX: 24.29 KG/M2 | HEART RATE: 60 BPM | TEMPERATURE: 97.8 F | RESPIRATION RATE: 16 BRPM | SYSTOLIC BLOOD PRESSURE: 122 MMHG | WEIGHT: 164 LBS | OXYGEN SATURATION: 100 %

## 2023-02-20 DIAGNOSIS — R00.2 PALPITATIONS: Primary | ICD-10-CM

## 2023-02-20 LAB
ALBUMIN SERPL-MCNC: 4.3 G/DL (ref 3.4–5)
ALP SERPL-CCNC: 70 IU/L (ref 35–126)
ALT SERPL-CCNC: 28 IU/L (ref 16–63)
ANION GAP SERPL CALC-SCNC: 7 MEQ/L (ref 3–15)
AST SERPL-CCNC: 25 IU/L (ref 15–41)
ATRIAL RATE: 80
BASOPHILS # BLD: 0.04 K/UL (ref 0.01–0.1)
BASOPHILS NFR BLD: 0.7 %
BILIRUB SERPL-MCNC: 0.7 MG/DL (ref 0.3–1.2)
BUN SERPL-MCNC: 18 MG/DL (ref 8–20)
CALCIUM SERPL-MCNC: 9.4 MG/DL (ref 8.9–10.3)
CHLORIDE SERPL-SCNC: 105 MEQ/L (ref 98–109)
CO2 SERPL-SCNC: 27 MEQ/L (ref 22–32)
CREAT SERPL-MCNC: 0.9 MG/DL (ref 0.8–1.3)
DIFFERENTIAL METHOD BLD: ABNORMAL
EOSINOPHIL # BLD: 0.43 K/UL (ref 0.04–0.54)
EOSINOPHIL NFR BLD: 7.7 %
ERYTHROCYTE [DISTWIDTH] IN BLOOD BY AUTOMATED COUNT: 12.3 % (ref 11.6–14.4)
GFR SERPL CREATININE-BSD FRML MDRD: >60 ML/MIN/1.73M*2
GLUCOSE SERPL-MCNC: 108 MG/DL (ref 70–99)
HCT VFR BLDCO AUTO: 40.4 % (ref 40.1–51)
HGB BLD-MCNC: 13.6 G/DL (ref 13.7–17.5)
IMM GRANULOCYTES # BLD AUTO: 0.01 K/UL (ref 0–0.08)
IMM GRANULOCYTES NFR BLD AUTO: 0.2 %
LYMPHOCYTES # BLD: 2.63 K/UL (ref 1.2–3.5)
LYMPHOCYTES NFR BLD: 46.9 %
MCH RBC QN AUTO: 30.2 PG (ref 28–33.2)
MCHC RBC AUTO-ENTMCNC: 33.7 G/DL (ref 32.2–36.5)
MCV RBC AUTO: 89.6 FL (ref 83–98)
MONOCYTES # BLD: 0.56 K/UL (ref 0.3–1)
MONOCYTES NFR BLD: 10 %
NEUTROPHILS # BLD: 1.94 K/UL (ref 1.7–7)
NEUTS SEG NFR BLD: 34.5 %
NRBC BLD-RTO: 0 %
P AXIS: -1
PDW BLD AUTO: 10.4 FL (ref 9.4–12.4)
PLATELET # BLD AUTO: 224 K/UL (ref 150–350)
POTASSIUM SERPL-SCNC: 3.9 MEQ/L (ref 3.6–5.1)
PR INTERVAL: 186
PROT SERPL-MCNC: 7.6 G/DL (ref 6–8.2)
QRS DURATION: 96
QT INTERVAL: 402
QTC CALCULATION(BAZETT): 411
R AXIS: 11
RBC # BLD AUTO: 4.51 M/UL (ref 4.5–5.8)
SODIUM SERPL-SCNC: 139 MEQ/L (ref 136–144)
T WAVE AXIS: 20
TROPONIN I SERPL HS-MCNC: 4.3 PG/ML
TROPONIN I SERPL HS-MCNC: 5.5 PG/ML
VENTRICULAR RATE: 63
WBC # BLD AUTO: 5.61 K/UL (ref 3.8–10.5)

## 2023-02-20 PROCEDURE — 85025 COMPLETE CBC W/AUTO DIFF WBC: CPT | Performed by: EMERGENCY MEDICINE

## 2023-02-20 PROCEDURE — 93005 ELECTROCARDIOGRAM TRACING: CPT | Performed by: EMERGENCY MEDICINE

## 2023-02-20 PROCEDURE — 84484 ASSAY OF TROPONIN QUANT: CPT | Mod: 91

## 2023-02-20 PROCEDURE — 84484 ASSAY OF TROPONIN QUANT: CPT | Mod: 91 | Performed by: EMERGENCY MEDICINE

## 2023-02-20 PROCEDURE — 36415 COLL VENOUS BLD VENIPUNCTURE: CPT

## 2023-02-20 PROCEDURE — 80053 COMPREHEN METABOLIC PANEL: CPT | Performed by: EMERGENCY MEDICINE

## 2023-02-20 PROCEDURE — 93005 ELECTROCARDIOGRAM TRACING: CPT

## 2023-02-20 PROCEDURE — 99283 EMERGENCY DEPT VISIT LOW MDM: CPT

## 2023-02-20 PROCEDURE — 84484 ASSAY OF TROPONIN QUANT: CPT

## 2023-02-20 PROCEDURE — 85025 COMPLETE CBC W/AUTO DIFF WBC: CPT

## 2023-02-20 PROCEDURE — 80053 COMPREHEN METABOLIC PANEL: CPT

## 2023-02-20 ASSESSMENT — ENCOUNTER SYMPTOMS: PALPITATIONS: 1

## 2023-02-20 NOTE — ED PROVIDER NOTES
Emergency Medicine Note  HPI   HISTORY OF PRESENT ILLNESS     68-year-old male presents to the emergency department for evaluation of palpitations.  Patient states he has been having intermittent palpitations for months.  They appear to be have gotten worse over the last 4 weeks.  He is contacted his cardiologist as an outpatient.  Patient follows with Dr. Suresh and her from [facility] cardiology.  Patient was started on a Holter monitor approximately 4 days ago.  Patient states he had no symptoms from the palpitations over the first 3 days but over the last 24 hours has had intermittent fairly constant progressive palpitations.  Patient been pushing the button to alert this company that he has been having symptoms.  He was concerned as to the degree of the palpitations that he has not experienced in the past.  Patient denies having any pain.      History provided by:  Patient   used: No    Palpitations  Palpitations quality:  Irregular  Onset quality:  Sudden  Duration:  20 hours  Timing:  Intermittent  Progression:  Waxing and waning  Chronicity:  Recurrent  Context: anxiety    Relieved by:  Nothing  Worsened by:  Nothing  Ineffective treatments:  None tried        Patient History   PAST HISTORY     Reviewed from Nursing Triage:       Past Medical History:   Diagnosis Date   • Coronary artery disease     Hx if acute MI, s/p stent placement   • Lipid disorder    • Myocardial infarction (CMS/HCC)        Past Surgical History:   Procedure Laterality Date   • CORONARY ANGIOPLASTY WITH STENT PLACEMENT     • HERNIA REPAIR     • NASAL SEPTUM SURGERY     • TONSILLECTOMY     • TRIGGER FINGER RELEASE         Family History   Problem Relation Age of Onset   • Lymphoma Biological Mother    • Colon cancer Biological Father 64       Social History     Tobacco Use   • Smoking status: Former   • Smokeless tobacco: Never   Vaping Use   • Vaping Use: Never used   Substance Use Topics   • Alcohol use: Yes   • Drug use:  Defer         Review of Systems   REVIEW OF SYSTEMS     Review of Systems   Cardiovascular: Positive for palpitations.         VITALS     ED Vitals    Date/Time Temp Pulse Resp BP SpO2 [facility]   02/20/23 0349 -- 60 16 122/70 -- AC   02/20/23 0108 36.6 °C (97.8 °F) 78 16 146/74 100 % AC        Pulse Ox %: 100 % (02/20/23 0507)  Pulse Ox Interpretation: Normal (02/20/23 0507)  Heart Rate: 63 (02/20/23 0507)  Rhythm Strip Interpretation: Normal Sinus Rhythm (02/20/23 0507)     Physical Exam   PHYSICAL EXAM     Physical Exam  Vitals and nursing note reviewed.   Constitutional:       General: He is not in acute distress.     Appearance: He is well-developed.   HENT:      Head: Normocephalic and atraumatic.   Eyes:      Conjunctiva/sclera: Conjunctivae normal.   Cardiovascular:      Rate and Rhythm: Normal rate and regular rhythm.      Heart sounds: Normal heart sounds. No murmur heard.  Pulmonary:      Effort: Pulmonary effort is normal. No respiratory distress.      Breath sounds: Normal breath sounds.   Abdominal:      Palpations: Abdomen is soft.      Tenderness: There is no abdominal tenderness. There is no guarding or rebound.   Musculoskeletal:         General: Normal range of motion.      Cervical back: Neck supple.   Skin:     General: Skin is warm and dry.      Findings: No rash.   Neurological:      Mental Status: He is alert.      GCS: GCS eye subscore is 4. GCS verbal subscore is 5. GCS motor subscore is 6.           PROCEDURES     Procedures     DATA     Results     Procedure Component Value Units Date/Time    HS Troponin (with 2 hour reflex) [[id number]]  (Normal) Collected: 02/20/23 0115    Specimen: Blood, Venous Updated: 02/20/23 0203     High Sens Troponin I 5.5 pg/mL     Comprehensive metabolic panel [[id number]]  (Abnormal) Collected: 02/20/23 0115    Specimen: Blood, Venous Updated: 02/20/23 0150     Sodium 139 mEQ/L      Potassium 3.9 mEQ/L      Comment: Results obtained on plasma. Plasma Potassium values  may be up to 0.4 mEQ/L less than serum values. The differences may be greater for patients with high platelet or white cell counts.        Chloride 105 mEQ/L      CO2 27 mEQ/L      BUN 18 mg/dL      Creatinine 0.9 mg/dL      Glucose 108 mg/dL      Calcium 9.4 mg/dL      AST (SGOT) 25 IU/L      ALT (SGPT) 28 IU/L      Alkaline Phosphatase 70 IU/L      Total Protein 7.6 g/dL      Comment: Test performed on plasma which typically contains approximately 0.4 g/dL more protein than serum.        Albumin 4.3 g/dL      Bilirubin, Total 0.7 mg/dL      eGFR >60.0 mL/min/1.73m*2      Anion Gap 7 mEQ/L     CBC and differential [[id number]]  (Abnormal) Collected: 02/20/23 0115    Specimen: Blood, Venous Updated: 02/20/23 0129     WBC 5.61 K/uL      RBC 4.51 M/uL      Hemoglobin 13.6 g/dL      Hematocrit 40.4 %      MCV 89.6 fL      MCH 30.2 pg      MCHC 33.7 g/dL      RDW 12.3 %      Platelets 224 K/uL      MPV 10.4 fL      Differential Type Auto     nRBC 0.0 %      Immature Granulocytes 0.2 %      Neutrophils 34.5 %      Lymphocytes 46.9 %      Monocytes 10.0 %      Eosinophils 7.7 %      Basophils 0.7 %      Immature Granulocytes, Absolute 0.01 K/uL      Neutrophils, Absolute 1.94 K/uL      Lymphocytes, Absolute 2.63 K/uL      Monocytes, Absolute 0.56 K/uL      Eosinophils, Absolute 0.43 K/uL      Basophils, Absolute 0.04 K/uL           Imaging Results    None         ECG 12 lead   ED Interpretation   NSR rate 63 with PAC          Scoring tools                                  ED Course & MDM   MDM / ED COURSE / CLINICAL IMPRESSION / DISPO     Medical Decision Making  67-year-old male with a history of coronary artery disease on a Holter monitor for palpitations presents complaining of palpitations.    Work-up in ER included an EKG which showed occasional PVCs and PACs but no other acute abnormality.  Work-up included laboratory studies were unremarkable 2 sets of cardiac enzymes are negative there is no evidence for any acute  cardiopulmonary disease.    Patient was advised to continue wearing Holter monitor.  Patient should contact his cardiologist today to review the tape over the weekend to see if there was any abnormal heartbeats or ectopic he which may explain his intermittent palpitations.  No evidence for any acute cardiac emergency at this point.  And patient may be safely discharged home.    Palpitations: acute illness or injury with systemic symptoms  Amount and/or Complexity of Data Reviewed  Independent Historian: spouse  Labs: ordered.  ECG/medicine tests: ordered and independent interpretation performed.             Clinical Impression      Palpitations     _________________     ED Disposition   Discharge                   SUKHDEV Devries MD  02/20/23 0587

## 2023-05-10 ENCOUNTER — APPOINTMENT (OUTPATIENT)
Dept: URBAN - METROPOLITAN AREA CLINIC 203 | Age: 68
Setting detail: DERMATOLOGY
End: 2023-05-22

## 2023-05-10 DIAGNOSIS — L57.0 ACTINIC KERATOSIS: ICD-10-CM

## 2023-05-10 DIAGNOSIS — B07.8 OTHER VIRAL WARTS: ICD-10-CM

## 2023-05-10 PROCEDURE — 17000 DESTRUCT PREMALG LESION: CPT | Mod: 59

## 2023-05-10 PROCEDURE — OTHER LIQUID NITROGEN: OTHER

## 2023-05-10 PROCEDURE — 17003 DESTRUCT PREMALG LES 2-14: CPT | Mod: 59

## 2023-05-10 PROCEDURE — OTHER PRESCRIPTION MEDICATION MANAGEMENT: OTHER

## 2023-05-10 PROCEDURE — 17110 DESTRUCT B9 LESION 1-14: CPT

## 2023-05-10 ASSESSMENT — LOCATION DETAILED DESCRIPTION DERM
LOCATION DETAILED: LEFT AXILLARY VAULT
LOCATION DETAILED: LEFT SUPERIOR MEDIAL FOREHEAD
LOCATION DETAILED: LEFT SUPERIOR PARIETAL SCALP
LOCATION DETAILED: RIGHT SUPERIOR PARIETAL SCALP
LOCATION DETAILED: MID-FRONTAL SCALP
LOCATION DETAILED: RIGHT SUPERIOR OCCIPITAL SCALP
LOCATION DETAILED: LEFT CENTRAL FRONTAL SCALP

## 2023-05-10 ASSESSMENT — PAIN INTENSITY VAS: HOW INTENSE IS YOUR PAIN 0 BEING NO PAIN, 10 BEING THE MOST SEVERE PAIN POSSIBLE?: NO PAIN

## 2023-05-10 ASSESSMENT — LOCATION ZONE DERM
LOCATION ZONE: AXILLAE
LOCATION ZONE: SCALP
LOCATION ZONE: FACE

## 2023-05-10 ASSESSMENT — LOCATION SIMPLE DESCRIPTION DERM
LOCATION SIMPLE: RIGHT OCCIPITAL SCALP
LOCATION SIMPLE: LEFT SCALP
LOCATION SIMPLE: LEFT AXILLARY VAULT
LOCATION SIMPLE: ANTERIOR SCALP
LOCATION SIMPLE: LEFT FOREHEAD
LOCATION SIMPLE: SCALP

## 2023-05-10 NOTE — PROCEDURE: PRESCRIPTION MEDICATION MANAGEMENT
Render In Strict Bullet Format?: No
Plan: Roland in the fall for scalp.
Detail Level: Zone
Defer Treatment (Provide Reason For Deferment In Text Field Below): Treated with cryotherapy. If not resolved, consider shave removal

## 2023-05-10 NOTE — PROCEDURE: LIQUID NITROGEN
Medical Necessity Information: It is in your best interest to select a reason for this procedure from the list below. All of these items fulfill various CMS LCD requirements except the new and changing color options.
Add 52 Modifier (Optional): no
Show Applicator Variable?: Yes
Detail Level: Detailed
Post-Care Instructions: I reviewed with the patient in detail post-care instructions. Patient is to wear sunprotection, and avoid picking at any of the treated lesions. Pt may apply Vaseline to crusted or scabbing areas.
Spray Paint Text: The liquid nitrogen was applied to the skin utilizing a spray paint frosting technique.
Consent: The patient's consent was obtained including but not limited to risks of crusting, scabbing, blistering, scarring, darker or lighter pigmentary change, recurrence, incomplete removal and infection.
Medical Necessity Clause: This procedure was medically necessary because the lesions that were treated were:
Duration Of Freeze Thaw-Cycle (Seconds): 0
Number Of Freeze-Thaw Cycles: 2 freeze-thaw cycles

## 2023-09-29 ENCOUNTER — HOSPITAL ENCOUNTER (OUTPATIENT)
Dept: RADIOLOGY | Facility: CLINIC | Age: 68
Discharge: HOME | End: 2023-09-29
Attending: RADIOLOGY
Payer: MEDICARE

## 2023-09-29 VITALS — WEIGHT: 165 LBS | HEIGHT: 68 IN | BODY MASS INDEX: 25.01 KG/M2

## 2023-09-29 DIAGNOSIS — C61 MALIGNANT NEOPLASM OF PROSTATE (CMS/HCC): ICD-10-CM

## 2023-09-29 RX ORDER — FLUDEOXYGLUCOSE F18 300 MCI/ML
8.86 INJECTION INTRAVENOUS ONCE
Status: DISCONTINUED | OUTPATIENT
Start: 2023-09-29 | End: 2023-09-29

## 2024-06-03 ENCOUNTER — APPOINTMENT (OUTPATIENT)
Dept: URBAN - METROPOLITAN AREA CLINIC 203 | Age: 69
Setting detail: DERMATOLOGY
End: 2024-06-06

## 2024-06-03 DIAGNOSIS — L57.8 OTHER SKIN CHANGES DUE TO CHRONIC EXPOSURE TO NONIONIZING RADIATION: ICD-10-CM

## 2024-06-03 DIAGNOSIS — L82.1 OTHER SEBORRHEIC KERATOSIS: ICD-10-CM

## 2024-06-03 DIAGNOSIS — L82.0 INFLAMED SEBORRHEIC KERATOSIS: ICD-10-CM

## 2024-06-03 DIAGNOSIS — L85.3 XEROSIS CUTIS: ICD-10-CM

## 2024-06-03 DIAGNOSIS — Z71.89 OTHER SPECIFIED COUNSELING: ICD-10-CM

## 2024-06-03 DIAGNOSIS — L81.4 OTHER MELANIN HYPERPIGMENTATION: ICD-10-CM

## 2024-06-03 DIAGNOSIS — D22 MELANOCYTIC NEVI: ICD-10-CM

## 2024-06-03 DIAGNOSIS — D18.0 HEMANGIOMA: ICD-10-CM

## 2024-06-03 PROBLEM — D22.5 MELANOCYTIC NEVI OF TRUNK: Status: ACTIVE | Noted: 2024-06-03

## 2024-06-03 PROBLEM — D18.01 HEMANGIOMA OF SKIN AND SUBCUTANEOUS TISSUE: Status: ACTIVE | Noted: 2024-06-03

## 2024-06-03 PROBLEM — D22.62 MELANOCYTIC NEVI OF LEFT UPPER LIMB, INCLUDING SHOULDER: Status: ACTIVE | Noted: 2024-06-03

## 2024-06-03 PROCEDURE — OTHER LIQUID NITROGEN: OTHER

## 2024-06-03 PROCEDURE — OTHER PRESCRIPTION MEDICATION MANAGEMENT: OTHER

## 2024-06-03 PROCEDURE — 99213 OFFICE O/P EST LOW 20 MIN: CPT | Mod: 25

## 2024-06-03 PROCEDURE — 17110 DESTRUCT B9 LESION 1-14: CPT

## 2024-06-03 PROCEDURE — OTHER SUNSCREEN RECOMMENDATIONS: OTHER

## 2024-06-03 PROCEDURE — OTHER COUNSELING: OTHER

## 2024-06-03 ASSESSMENT — LOCATION SIMPLE DESCRIPTION DERM
LOCATION SIMPLE: RIGHT UPPER BACK
LOCATION SIMPLE: LEFT FOREARM
LOCATION SIMPLE: ABDOMEN
LOCATION SIMPLE: LEFT SHOULDER
LOCATION SIMPLE: RIGHT SHOULDER
LOCATION SIMPLE: RIGHT FOREARM
LOCATION SIMPLE: LEFT UPPER BACK
LOCATION SIMPLE: UPPER BACK
LOCATION SIMPLE: CHEST
LOCATION SIMPLE: LEFT UPPER ARM

## 2024-06-03 ASSESSMENT — LOCATION DETAILED DESCRIPTION DERM
LOCATION DETAILED: LEFT ANTERIOR PROXIMAL UPPER ARM
LOCATION DETAILED: RIGHT PROXIMAL DORSAL FOREARM
LOCATION DETAILED: LEFT MEDIAL UPPER BACK
LOCATION DETAILED: SUPERIOR THORACIC SPINE
LOCATION DETAILED: LEFT MEDIAL INFERIOR CHEST
LOCATION DETAILED: RIGHT MID-UPPER BACK
LOCATION DETAILED: LEFT PROXIMAL DORSAL FOREARM
LOCATION DETAILED: LEFT POSTERIOR SHOULDER
LOCATION DETAILED: EPIGASTRIC SKIN
LOCATION DETAILED: RIGHT POSTERIOR SHOULDER

## 2024-06-03 ASSESSMENT — LOCATION ZONE DERM
LOCATION ZONE: TRUNK
LOCATION ZONE: ARM

## 2024-06-03 ASSESSMENT — PAIN INTENSITY VAS: HOW INTENSE IS YOUR PAIN 0 BEING NO PAIN, 10 BEING THE MOST SEVERE PAIN POSSIBLE?: NO PAIN

## 2024-06-03 NOTE — PROCEDURE: LIQUID NITROGEN
Detail Level: Detailed
Show Topical Anesthesia Variable?: Yes
Consent: The patient's consent was obtained including but not limited to risks of crusting, scabbing, blistering, scarring, darker or lighter pigmentary change, recurrence, incomplete removal and infection.
Render Post-Care Instructions In Note?: no
Post-Care Instructions: I reviewed with the patient in detail post-care instructions. Patient is to wear sunprotection, and avoid picking at any of the treated lesions. Pt may apply Vaseline to crusted or scabbing areas.
Medical Necessity Clause: This procedure was medically necessary because the lesions that were treated were:
Spray Paint Text: The liquid nitrogen was applied to the skin utilizing a spray paint frosting technique.
Number Of Freeze-Thaw Cycles: 3 freeze-thaw cycles
Medical Necessity Information: It is in your best interest to select a reason for this procedure from the list below. All of these items fulfill various CMS LCD requirements except the new and changing color options.

## 2024-06-27 ENCOUNTER — TRANSCRIBE ORDERS (OUTPATIENT)
Dept: REGISTRATION | Facility: REHABILITATION | Age: 69
End: 2024-06-27

## 2024-06-27 DIAGNOSIS — M54.16 RADICULOPATHY, LUMBAR REGION: Primary | ICD-10-CM

## 2024-07-29 ENCOUNTER — HOSPITAL ENCOUNTER (OUTPATIENT)
Dept: PHYSICAL THERAPY | Age: 69
Setting detail: THERAPIES SERIES
Discharge: HOME | End: 2024-07-29
Attending: PHYSICAL MEDICINE & REHABILITATION
Payer: MEDICARE

## 2024-07-29 DIAGNOSIS — M54.16 RADICULOPATHY, LUMBAR REGION: Primary | ICD-10-CM

## 2024-07-29 PROCEDURE — 97161 PT EVAL LOW COMPLEX 20 MIN: CPT | Mod: GP

## 2024-07-29 PROCEDURE — 97530 THERAPEUTIC ACTIVITIES: CPT | Mod: GP

## 2024-07-29 NOTE — PROGRESS NOTES
Physical Therapy Evaluation    [facility] OP Therapy Fax: [phone]    PT EVALUATION FOR OUTPATIENT THERAPY    Patient: Nadeem Morales    MRN: [id number]  : 1955 68 y.o.     Referring Physician: Wilton Hobbs DO  Date of Visit: 2024      Certification Dates:  24 through 24         Recommended Frequency & Duration:  2 times/week for up to 8 weeks     Diagnosis:   1. Radiculopathy, lumbar region        Chief Complaints:   Chief Complaint   Patient presents with    Difficulty Walking    Dec Strength    Pain     Difficulty Performing Work/school Tasks    Decreased recreational/play activity    Poor Symptom Management       Precautions: no known precautions/restrictions  Precautions additional comments:      Past Medical History:   Past Medical History:   Diagnosis Date    Coronary artery disease     Hx if acute MI, s/p stent placement    Lipid disorder     Myocardial infarction (CMS/HCC)        Past Surgical History:   Past Surgical History:   Procedure Laterality Date    CORONARY ANGIOPLASTY WITH STENT PLACEMENT      HERNIA REPAIR      NASAL SEPTUM SURGERY      TONSILLECTOMY      TRIGGER FINGER RELEASE           LEARNING ASSESSMENT    Assessment completed:  Yes    Learner name:  Nadeem Morales     Learner: Patient    Learning Barriers:  Learning barriers: No Barriers    Preferred Language: English     Needed: No    Education Provided:   Method: Discussion, Handout, and Demonstration  Readiness: acceptance  Response: Demonstrated understanding    Welcome letter discussed: Yes Patient provided with Welcome Letter, which includes attendance policy. Provided education regarding cancellation and no-show policy. Education regarding the importance of participation and regular attendance to maximize goal attainment.       OBJECTIVE MEASUREMENTS/DATA:    Time In Session:  Start Time: 1400  Stop Time: 1455  Time Calculation (min): 55 min   Assessment and Plan - 24 8332           Assessment    Plan of Care reviewed and patient/family in agreement Yes     System Pathology/Pathophysiology Noted musculoskeletal     Functional Limitations in Following Categories (PT Eval) home management;community/leisure     Rehab Potential/Prognosis good, to achieve stated therapy goals     Problem List decreased endurance;decreased strength;decreased ROM;pain     Clinical Assessment Fidel is a 69 y/o male with 6 month history of low back and left thigh pain that is increased with standing and walking.  He reports that his symptoms are relieved with sitting and that after 30 minutes of standing or walking her has pain in his low back and eventually left thigh.  He has declined sugery and injections at this time and complete physical therapy for conservative treatment.  Fidel has decrease lumbar spine and hip mobility, segmentally he has decrease motion with PA glides however he is pain free.  Symptoms are relieved with flexion postures and her reports increase in general lumbar soreness with standing and prone extension.  Fidel has good distal strength however bilateral hip strength is decreased into all planes and he is challenged with breath control during lumbar and abdominal stabilization exercises.  Fidel will benefit from therapy 2x a week x 8 weeks utilizing a flexion based program to decrease the frequency and intensity of his symptoms.     Plan and Recommendations Continue with flexion based program     Planned Services CPT 01488 Gait training;CPT 19832 Manual therapy;CPT 21489 Neuromuscular Reeducation;CPT 93333 Therapeutic activities;CPT 97424 Therapeutic exercises                    General Information - 07/29/24 8194          Session Details    Document Type initial evaluation     Mode of Treatment individual therapy        General Information    Referring Physician Wilton Joyner     History of present illness/functional impairment I have been having some neuropathy into my left  leg, all of a sudden I have had sciatica and if I stand for any period of time my left thigh burns.  IT burns so bad that I have to sit down due to the pain. 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  I would prefer therapy or something that can make me feel better not just a shot.  When I walk around my neighborhood about 1.2 miles I start to feel it in my left thigh.  Pain is releived with sitting and only goes to my knee.  It really started about 6 months ago and I was getting cramps into my left leg down to my knee.  Currently retired and used to work as a .     Patient/Family/Caregiver Comments/Observations This morning I was on my feet for about an hour cooking and I felt it.     Existing Precautions/Restrictions no known precautions/restrictions                    Pain/Vitals - 07/29/24 1358          Pain Assessment    Currently in pain Yes     Preferred Pain Scale number (Numeric Rating Pain Scale)     Pain Side/Orientation left     Pain: Body location Leg     Pain Rating (0-10): Pre Activity 0     Pain Rating (0-10): Activity 7     Pain Rating (0-10): Post Activity 0        Pain Intervention    Intervention  Education and exercise     Post Intervention Comments See assessment                    Falls/Food Screening - 07/29/24 1358          Initial Falls Assessment    One or more falls in the last year No        Food Insecurity    Within the past 12 months, you worried that your food would run out before you got the money to buy more. Never true     Within the past 12 months, the food you bought just didn't last and you didn't have money to get more. Never true                    PT - 07/29/24 1359          Physical Therapy    Physical Therapy Specialty Ortho and Sports PT        PT Plan    Frequency of treatment 2 times/week     PT Duration 8 weeks     PT Cert From 07/29/24     PT Cert To 09/27/24     Date PT POC was sent to  provider 07/29/24     Signed PT Plan of Care received?  No                       Sensory Tests    Sensory Testing - 07/29/24 1358          Sensory Assessment    Sensory Assessment sensation intact, lower extremities                   DTR    Deep Tendon Reflexes - 07/29/24 1358          Deep Tendon Reflexes    Left Patellar Reflex 2-->average, normal     Right Patellar Reflex 2-->average, normal     Left Achilles Reflex 2-->average, normal     Right Achilles Reflex 2-->average, normal                   ROM    Range of Motion - 07/29/24 1400          RIGHT: Lower Extremity AROM Assessment    Hip Internal Rotation   30 degrees     Hip External Rotation   35 degrees        LEFT: Lower Extremity AROM Assessment    Hip Internal Rotation   32 degrees     Hip External Rotation   22 degrees        Lumbar AROM    Lumbar Flexion 25     Lumbar Extension 25     Lumbar Left SB 50     Lumbar Right SB 50     Lumbar Left Rotation 50     Lumbar Right Rotation 50                   MMT    Manual Muscle Tests - 07/29/24 1358          RIGHT: Lower Extremity Manual Muscle Test Assessment    Hip Flexion gross movement (4+/5) good plus     Hip Extension gross movement (4+/5) good plus     Hip Abduction gross movement (4+/5) good plus     Hip Internal Rotation gross movement (4+/5) good plus     Hip External Rotation gross movement (4+/5) good plus     Knee Flexion strength (5/5) normal     Knee Extension strength (5/5) normal     Ankle Dorsiflexion gross movement (5/5) normal     Ankle Plantarflexion gross movement (5/5) normal     Ankle Inversion gross movement (5/5) normal     Ankle Eversion gross movement (5/5) normal        LEFT: Lower Extremity Manual Muscle Test Assessment    Hip Flexion gross movement (4+/5) good plus     Hip Extension gross movement (4+/5) good plus     Hip Abduction gross movement (4+/5) good plus     Hip Internal Rotation gross movement (4+/5) good plus     Hip External Rotation gross movement (4+/5) good plus      Knee Flexion strength (5/5) normal     Knee Extension strength (5/5) normal     Ankle Dorsiflexion gross movement (5/5) normal     Ankle Plantarflexion gross movement (5/5) normal                   Palpation    Palpation - 07/29/24 1358          Palpation    Back Palpation  No pain reported with palpation to paraspinals, quadratus, gluteals, or piriformis        Manual Interventions    Manual technique #1 Hypomobile with PA glides lower thoracic and lumbar spine     Manual technique #2 Hypomobile with sacral flexion and extension                   Ortho Special Tests    Orthopedic Special Tests - 07/29/24 1400          Lumbar    Quadrant Test Right - Negative;Left - Negative     Repeated Movement Tests Right - Negative;Left - Negative     Thigh Thrust Right - Negative;Left - Negative        Neurodynamic     Slump Test Right - Negative;Left - Negative     SLR Right - Negative;Left - Negative                   Outcome Measures    PT Outcome Measures - 07/29/24 1358          Subjective Outcome Measures    Oswestry (FRAN) 10%                      Goals          Patient Stated      Patient Goals (pt-stated)       Decrease pain with standing and walking during daily activity.          Other      Mutually agreed upon pain goal       Mutually agreed upon pain goal: 2/10 with prolonged standing and walking        Therapy Goals       Short Term Goals Time Frame Result Progress   Decrease pain to 2/10 with prolonged standing 30 min 4  weeks     Pt will increase lumbar ROM >/= 25%  into flexion, extension, lateral flexion and rotation 4 weeks     Pt will demonstrate improvements in Oswestry >/=  4 % functional improvement 4 weeks  Initial 10   Demonstrate good symptom management strategies with lumbar flexion program  4 weeks       Long Term Goals Time Frame Result Progress   Pt will demonstrate good breath control with abdominal isometric exercise progressions  8  weeks     Pt will demonstrate improvements in Oswestry >/=  8 %  functional improvement 8  weeks  Initial 10   Go to fitness center in community and walking with good symptom management  8  weeks     Stand x 60 minutes to cook without pain  8  weeks                         TREATMENT PLAN:    IE 7/29/24   30 Day 8/29/24   60 Day 9/27/24   Treatment  Current Session Time   Modalities Total Time for Session Not performed   Heat/Ice  CPT 56782    E. Stimulation Manual  CPT 87326    E. Stim Unattended  CPT 73912    Ultrasound  CPT 30315    Manual   CPT 78078 Total Time for Session Not performed   STM/MFR/TPR    Instrument Assisted STM     Mobilizations    ROM/Flexibility Jim Stretch, Prone Quad Stretch    Myofascial Decompression    Ther. Exercise  CPT 31240                   Group Total Time for Session Not performed     Sets Reps Load Comment    Bike                Calf Stretch        Longsit Hamstring        Sciatic Glide         Quadratus Lumborum Stretch         S/L Thoracic Rotation         U/L Iso Hip Flexion        B/L Iso Hip Flexion      Feet on PB    Crunch Hip Add        90/90 Hip Add                Step Up Forward     Prevent lumbar extension    Bandslide                      Seated Lumbar Flexion   Completed through session for symptom management PRN         Neuro Re-Ed  CPT 50219   Group Total Time for Session Not performed     Sets Reps Load Comment                                     Gait  CPT 09972 Group Total Time for Session Not performed                  Ther. Activity  CPT 50113 Group Total Time for Session 8-22 Minutes   Patient Education  Education completed for anatomy using Essential Anatomy Application, symptom management with lumbar flexion, home programming, and goal setting.         Group  CPT 93119 Total Time for Session Not performed          ASSESSMENT:    This 68 y.o. year old male presents to PT with above stated diagnosis. Physical Therapy evaluation reveals decreased endurance, decreased strength, decreased ROM, pain resulting in home  management, community/leisure limitations. Nadeem Morales will benefit from skilled PT services to address limitation, work towards rehab and patient goals and maximize PLOF of chosen ADLs.     Planned Services: The patient's treatment will include CPT 78146 Gait training, CPT 76131 Manual therapy, CPT 68765 Neuromuscular Reeducation, CPT 67735 Therapeutic activities, CPT 49508 Therapeutic exercises, .     Cirilo Woodson, PT

## 2024-07-29 NOTE — OP PT TREATMENT LOG
IE 7/29/24   30 Day 8/29/24   60 Day 9/27/24   Treatment  Current Session Time   Modalities Total Time for Session Not performed   Heat/Ice  CPT 80760    E. Stimulation Manual  CPT 39270    E. Stim Unattended  CPT 13775    Ultrasound  CPT 18968    Manual   CPT 94647 Total Time for Session Not performed   STM/MFR/TPR    Instrument Assisted STM     Mobilizations    ROM/Flexibility Jim Stretch, Prone Quad Stretch    Myofascial Decompression    Ther. Exercise  CPT 73800                   Group Total Time for Session Not performed     Sets Reps Load Comment    Bike                Calf Stretch        Longsit Hamstring        Sciatic Glide         Quadratus Lumborum Stretch         S/L Thoracic Rotation         U/L Iso Hip Flexion        B/L Iso Hip Flexion      Feet on PB    Crunch Hip Add        90/90 Hip Add                Step Up Forward     Prevent lumbar extension    Bandslide                      Seated Lumbar Flexion   Completed through session for symptom management PRN         Neuro Re-Ed  CPT 26961   Group Total Time for Session Not performed     Sets Reps Load Comment                                     Gait  CPT 51456 Group Total Time for Session Not performed                  Ther. Activity  CPT 02030 Group Total Time for Session 8-22 Minutes   Patient Education  Education completed for anatomy using Essential Anatomy Application, symptom management with lumbar flexion, home programming, and goal setting.         Group  CPT 24961 Total Time for Session Not performed

## 2024-07-29 NOTE — LETTER
Dear DR. Hobbs,    Thank you for this referral. Please review the attached notes and plan of care for your approval.  Please contact our department with any questions.     Sincerely,     Cirilo Woodson, PT  [address] [facility] [address]  [address]  [place] [address]  Phone [phone]  Fax  [phone]    By co-signing this Plan of Care (POC) you agree to the following:  I have reviewed the the Plan of Care established by the therapist within this document and certify that the services are skilled and medically necessary. I have reviewed the plan and recommend that these services continue to meet the goals stated in this document.    PHYSICIAN SIGNATURE: __________________________________     DATE: ___________________  TIME: _____________           Physical Therapy Plan of Care 24   Effective from: 2024  Effective to: 2024    Plan ID: [id number]            Participants as of Finalize on 2024    Name Type Comments Contact Info    Wilton Hobbs DO Referring Provider  [phone]    Cirilo Woodson, PT Physical Therapist         Last Progress Notes Note     Author: Cirilo Woodson PT Status: Signed Last edited: 2024  2:00 PM       Physical Therapy Evaluation    [facility] Fax: [phone]    PT EVALUATION FOR OUTPATIENT THERAPY    Patient: Nadeem Morales    MRN: [id number]  : 1955 68 y.o.     Referring Physician: Wilton Hobbs DO  Date of Visit: 2024      Certification Dates:  24 through 24         Recommended Frequency & Duration:  2 times/week for up to 8 weeks     Diagnosis:   1. Radiculopathy, lumbar region        Chief Complaints:   Chief Complaint   Patient presents with   • Difficulty Walking   • Dec Strength   • Pain   •  Difficulty Performing Work/school Tasks   • Decreased recreational/play activity   • Poor Symptom Management       Precautions: no known precautions/restrictions  Precautions additional comments:      Past Medical History:   Past  Medical History:   Diagnosis Date   • Coronary artery disease     Hx if acute MI, s/p stent placement   • Lipid disorder    • Myocardial infarction (CMS/HCC)        Past Surgical History:   Past Surgical History:   Procedure Laterality Date   • CORONARY ANGIOPLASTY WITH STENT PLACEMENT     • HERNIA REPAIR     • NASAL SEPTUM SURGERY     • TONSILLECTOMY     • TRIGGER FINGER RELEASE           LEARNING ASSESSMENT    Assessment completed:  Yes    Learner name:  Nadeem Morales     Learner: Patient    Learning Barriers:  Learning barriers: No Barriers    Preferred Language: English     Needed: No    Education Provided:   Method: Discussion, Handout, and Demonstration  Readiness: acceptance  Response: Demonstrated understanding    Welcome letter discussed: Yes Patient provided with Welcome Letter, which includes attendance policy. Provided education regarding cancellation and no-show policy. Education regarding the importance of participation and regular attendance to maximize goal attainment.       OBJECTIVE MEASUREMENTS/DATA:    Time In Session:  Start Time: 1400  Stop Time: 1455  Time Calculation (min): 55 min   Assessment and Plan - 07/29/24 1358          Assessment    Plan of Care reviewed and patient/family in agreement Yes     System Pathology/Pathophysiology Noted musculoskeletal     Functional Limitations in Following Categories (PT Eval) home management;community/leisure     Rehab Potential/Prognosis good, to achieve stated therapy goals     Problem List decreased endurance;decreased strength;decreased ROM;pain     Clinical Assessment Fidel is a 67 y/o male with 6 month history of low back and left thigh pain that is increased with standing and walking.  He reports that his symptoms are relieved with sitting and that after 30 minutes of standing or walking her has pain in his low back and eventually left thigh.  He has declined sugery and injections at this time and complete physical therapy for  conservative treatment.  Fidel has decrease lumbar spine and hip mobility, segmentally he has decrease motion with PA glides however he is pain free.  Symptoms are relieved with flexion postures and her reports increase in general lumbar soreness with standing and prone extension.  Fidel has good distal strength however bilateral hip strength is decreased into all planes and he is challenged with breath control during lumbar and abdominal stabilization exercises.  Fidel will benefit from therapy 2x a week x 8 weeks utilizing a flexion based program to decrease the frequency and intensity of his symptoms.     Plan and Recommendations Continue with flexion based program     Planned Services CPT 78289 Gait training;CPT 26607 Manual therapy;CPT 54341 Neuromuscular Reeducation;CPT 88414 Therapeutic activities;CPT 85182 Therapeutic exercises                    General Information - 07/29/24 2763          Session Details    Document Type initial evaluation     Mode of Treatment individual therapy        General Information    Referring Physician Wilton Joyner     History of present illness/functional impairment I have been having some neuropathy into my left leg, all of a sudden I have had sciatica and if I stand for any period of time my left thigh burns.  IT burns so bad that I have to sit down due to the pain. 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  I would prefer therapy or something that can make me feel better not just a shot.  When I walk around my neighborhood about 1.2 miles I start to feel it in my left thigh.  Pain is releived with sitting and only goes to my knee.  It really started about 6 months ago and I was getting cramps into my left leg down to my knee.  Currently retired and used to work as a .     Patient/Family/Caregiver Comments/Observations This morning I was on my feet for about an hour cooking and I felt it.      Existing Precautions/Restrictions no known precautions/restrictions                    Pain/Vitals - 07/29/24 1358          Pain Assessment    Currently in pain Yes     Preferred Pain Scale number (Numeric Rating Pain Scale)     Pain Side/Orientation left     Pain: Body location Leg     Pain Rating (0-10): Pre Activity 0     Pain Rating (0-10): Activity 7     Pain Rating (0-10): Post Activity 0        Pain Intervention    Intervention  Education and exercise     Post Intervention Comments See assessment                    Falls/Food Screening - 07/29/24 1358          Initial Falls Assessment    One or more falls in the last year No        Food Insecurity    Within the past 12 months, you worried that your food would run out before you got the money to buy more. Never true     Within the past 12 months, the food you bought just didn't last and you didn't have money to get more. Never true                    PT - 07/29/24 1358          Physical Therapy    Physical Therapy [facility] PT        PT Plan    Frequency of treatment 2 times/week     PT Duration 8 weeks     PT Cert From 07/29/24     PT Cert To 09/27/24     Date PT POC was sent to provider 07/29/24     Signed PT Plan of Care received?  No                       Sensory Tests    Sensory Testing - 07/29/24 1358          Sensory Assessment    Sensory Assessment sensation intact, lower extremities                   DTR    Deep Tendon Reflexes - 07/29/24 1358          Deep Tendon Reflexes    Left Patellar Reflex 2-->average, normal     Right Patellar Reflex 2-->average, normal     Left Achilles Reflex 2-->average, normal     Right Achilles Reflex 2-->average, normal                   ROM    Range of Motion - 07/29/24 1400          RIGHT: Lower Extremity AROM Assessment    Hip Internal Rotation   30 degrees     Hip External Rotation   35 degrees        LEFT: Lower Extremity AROM Assessment    Hip Internal Rotation   32 degrees     Hip External  Rotation   22 degrees        Lumbar AROM    Lumbar Flexion 25     Lumbar Extension 25     Lumbar Left SB 50     Lumbar Right SB 50     Lumbar Left Rotation 50     Lumbar Right Rotation 50                   MMT    Manual Muscle Tests - 07/29/24 1358          RIGHT: Lower Extremity Manual Muscle Test Assessment    Hip Flexion gross movement (4+/5) good plus     Hip Extension gross movement (4+/5) good plus     Hip Abduction gross movement (4+/5) good plus     Hip Internal Rotation gross movement (4+/5) good plus     Hip External Rotation gross movement (4+/5) good plus     Knee Flexion strength (5/5) normal     Knee Extension strength (5/5) normal     Ankle Dorsiflexion gross movement (5/5) normal     Ankle Plantarflexion gross movement (5/5) normal     Ankle Inversion gross movement (5/5) normal     Ankle Eversion gross movement (5/5) normal        LEFT: Lower Extremity Manual Muscle Test Assessment    Hip Flexion gross movement (4+/5) good plus     Hip Extension gross movement (4+/5) good plus     Hip Abduction gross movement (4+/5) good plus     Hip Internal Rotation gross movement (4+/5) good plus     Hip External Rotation gross movement (4+/5) good plus     Knee Flexion strength (5/5) normal     Knee Extension strength (5/5) normal     Ankle Dorsiflexion gross movement (5/5) normal     Ankle Plantarflexion gross movement (5/5) normal                   Palpation    Palpation - 07/29/24 1358          Palpation    Back Palpation  No pain reported with palpation to paraspinals, quadratus, gluteals, or piriformis        Manual Interventions    Manual technique #1 Hypomobile with PA glides lower thoracic and lumbar spine     Manual technique #2 Hypomobile with sacral flexion and extension                   Ortho Special Tests    Orthopedic Special Tests - 07/29/24 1400          Lumbar    Quadrant Test Right - Negative;Left - Negative     Repeated Movement Tests Right - Negative;Left - Negative     Thigh Thrust Right -  Negative;Left - Negative        Neurodynamic     Slump Test Right - Negative;Left - Negative     SLR Right - Negative;Left - Negative                   Outcome Measures    PT Outcome Measures - 07/29/24 1358          Subjective Outcome Measures    Oswestry (FRAN) 10%                      Goals          Patient Stated    •  Patient Goals (pt-stated)       Decrease pain with standing and walking during daily activity.          Other    •  Mutually agreed upon pain goal       Mutually agreed upon pain goal: 2/10 with prolonged standing and walking      •  Therapy Goals       Short Term Goals Time Frame Result Progress   Decrease pain to 2/10 with prolonged standing 30 min 4  weeks     Pt will increase lumbar ROM >/= 25%  into flexion, extension, lateral flexion and rotation 4 weeks     Pt will demonstrate improvements in Oswestry >/=  4 % functional improvement 4 weeks  Initial 10   Demonstrate good symptom management strategies with lumbar flexion program  4 weeks       Long Term Goals Time Frame Result Progress   Pt will demonstrate good breath control with abdominal isometric exercise progressions  8  weeks     Pt will demonstrate improvements in Oswestry >/=  8 % functional improvement 8  weeks  Initial 10   Go to fitness center in community and walking with good symptom management  8  weeks     Stand x 60 minutes to cook without pain  8  weeks                         TREATMENT PLAN:    IE 7/29/24   30 Day 8/29/24   60 Day 9/27/24   Treatment  Current Session Time   Modalities Total Time for Session Not performed   Heat/Ice  CPT 77266    E. Stimulation Manual  CPT 05648    E. Stim Unattended  CPT 33826    Ultrasound  CPT 13826    Manual   CPT 79916 Total Time for Session Not performed   STM/MFR/TPR    Instrument Assisted STM     Mobilizations    ROM/Flexibility Jim Stretch, Prone Quad Stretch    Myofascial Decompression    Ther. Exercise  CPT 47454                   Group Total Time for Session Not performed      Sets Reps Load Comment    Bike                Calf Stretch        Longsit Hamstring        Sciatic Glide         Quadratus Lumborum Stretch         S/L Thoracic Rotation         U/L Iso Hip Flexion        B/L Iso Hip Flexion      Feet on PB    Crunch Hip Add        90/90 Hip Add                Step Up Forward     Prevent lumbar extension    Bandslide                      Seated Lumbar Flexion   Completed through session for symptom management PRN         Neuro Re-Ed  CPT 36414   Group Total Time for Session Not performed     Sets Reps Load Comment                                     Gait  CPT 92734 Group Total Time for Session Not performed                  Ther. Activity  CPT 07306 Group Total Time for Session 8-22 Minutes   Patient Education  Education completed for anatomy using Essential Anatomy Application, symptom management with lumbar flexion, home programming, and goal setting.         Group  CPT 07488 Total Time for Session Not performed          ASSESSMENT:    This 68 y.o. year old male presents to PT with above stated diagnosis. Physical Therapy evaluation reveals decreased endurance, decreased strength, decreased ROM, pain resulting in home management, community/leisure limitations. Nadeem Morales will benefit from skilled PT services to address limitation, work towards rehab and patient goals and maximize PLOF of chosen ADLs.     Planned Services: The patient's treatment will include CPT 87264 Gait training, CPT 82414 Manual therapy, CPT 28784 Neuromuscular Reeducation, CPT 26002 Therapeutic activities, CPT 54698 Therapeutic exercises, .     Cirilo Woodson PT                           Current Participants as of 7/29/2024    Name Type Comments Contact Info    Wilton Hobbs DO Referring Provider  [phone]    Signature pending    Cirilo Woodson PT Physical Therapist      Signature pending

## 2024-08-15 ENCOUNTER — HOSPITAL ENCOUNTER (OUTPATIENT)
Dept: PHYSICAL THERAPY | Age: 69
Setting detail: THERAPIES SERIES
Discharge: HOME | End: 2024-08-15
Attending: PHYSICAL MEDICINE & REHABILITATION
Payer: MEDICARE

## 2024-08-15 DIAGNOSIS — M54.16 RADICULOPATHY, LUMBAR REGION: Primary | ICD-10-CM

## 2024-08-15 PROCEDURE — 97150 GROUP THERAPEUTIC PROCEDURES: CPT | Mod: GP

## 2024-08-15 PROCEDURE — 97110 THERAPEUTIC EXERCISES: CPT | Mod: GP,59

## 2024-08-15 NOTE — OP PT TREATMENT LOG
IE 7/29/24   30 Day 8/29/24   60 Day 9/27/24   Treatment  Current Session Time   Modalities Total Time for Session Not performed   Heat/Ice  CPT 05521    E. Stimulation Manual  CPT 68529    E. Stim Unattended  CPT 27186    Ultrasound  CPT 41122    Manual   CPT 58265 Total Time for Session 0-7 Minutes   STM/MFR/TPR    Instrument Assisted STM     Mobilizations    ROM/Flexibility Jim Stretch end of session   Prone Quad Stretch Not completed 8/15   Myofascial Decompression    Ther. Exercise  CPT 52635                   Group Total Time for Session 23-37 Minutes     Sets Reps Load Comment    Nustep  x 1              Calf Stretch x  3     Longsit Hamstring x  3     Sciatic Glide         Quadratus Lumborum Stretch  x  5     S/L Thoracic Rotation  x  5     U/L Iso Hip Flexion   10     B/L Iso Hip Flexion    10  Feet on PB    Crunch Hip Add   15     90/90 Hip Add                Step Up Forward     Prevent lumbar extension    Bandslide                      Seated Lumbar Flexion  x Completed through session for symptom management PRN         Neuro Re-Ed  CPT 58357   Group Total Time for Session Not performed     Sets Reps Load Comment                                     Gait  CPT 17460 Group Total Time for Session Not performed                  Ther. Activity  CPT 42181 Group Total Time for Session Not performed   Patient Education  Education completed for anatomy using Essential Anatomy Application, symptom management with lumbar flexion, home programming, and goal setting.         Group  CPT 38657 Total Time for Session 23-37 Minutes

## 2024-08-15 NOTE — PROGRESS NOTES
Physical Therapy Visit    PT DAILY NOTE FOR OUTPATIENT THERAPY    Patient: Nadeem Morales MRN: [id number]  : 1955 68 y.o.  Referring Physician: Wilton Hobbs DO  Date of Visit: 8/15/2024    Certification Dates: 24 through 24    Diagnosis:   1. Radiculopathy, lumbar region        Chief Complaints:  Low back with right LE radiculopathy limiting daily activity    Precautions:   Existing Precautions/Restrictions: no known precautions/restrictions      TODAY'S VISIT    Time In Session:  Start Time: 1000  Stop Time: 1058  Time Calculation (min): 58 min   History/Vitals/Pain/Encounter Info - 08/15/24 1000          Injury History/Precautions/Daily Required Info    Document Type daily treatment     Primary Therapist Cirilo Woodson     Chief Complaint/Reason for Visit  Low back with right LE radiculopathy limiting daily activity     Referring Physician Wilton Joyner     Existing Precautions/Restrictions no known precautions/restrictions     History of present illness/functional impairment I have been having some neuropathy into my left leg, all of a sudden I have had sciatica and if I stand for any period of time my left thigh burns.  IT burns so bad that I have to sit down due to the pain. 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  I would prefer therapy or something that can make me feel better not just a shot.  When I walk around my neighborhood about 1.2 miles I start to feel it in my left thigh.  Pain is releived with sitting and only goes to my knee.  It really started about 6 months ago and I was getting cramps into my left leg down to my knee.  Currently retired and used to work as a .     Patient/Family/Caregiver Comments/Observations I had a death in the family and I have not done some of my exercises lately, I have felt better and the burning is less but when I did some remodeling I felt it in my left  thigh.     Patient reported fall since last visit No        Pain Assessment    Currently in pain Yes     Preferred Pain Scale number (Numeric Rating Pain Scale)     Pain Side/Orientation left     Pain: Body location Leg;Buttock     Pain Rating (0-10): Pre Activity 0     Pain Rating (0-10): Activity 5     Pain Rating (0-10): Post Activity 0        Pain Intervention    Intervention  Education and exercise     Post Intervention Comments See assessment                    Daily Treatment Assessment and Plan - 08/15/24 1000          Daily Treatment Assessment and Plan    Progress toward goals Progressing     Daily Outcome Summary Fidel presents to his first follow up session continued left anterior thigh and left superior glute pain.  His pain is decreased however persists, he reports no pain and improved mobility at end of therapy session.  Encouraged daily home programming when he returns to his normal routine and lumbar flexion for symptom management.  Noted decreased in left hip flexor and quadricps mobility during Jim stretch, cues through session for breath control and set/rep count.     Plan and Recommendations Continue with flexion based program                         OBJECTIVE DATA TAKEN TODAY:    None taken    Today's Treatment:    IE 7/29/24   30 Day 8/29/24   60 Day 9/27/24   Treatment  Current Session Time   Modalities Total Time for Session Not performed   Heat/Ice  CPT 81897    E. Stimulation Manual  CPT 53609    E. Stim Unattended  CPT 97666    Ultrasound  CPT 78955    Manual   CPT 73102 Total Time for Session 0-7 Minutes   STM/MFR/TPR    Instrument Assisted STM     Mobilizations    ROM/Flexibility Jim Stretch end of session   Prone Quad Stretch Not completed 8/15   Myofascial Decompression    Ther. Exercise  CPT 37490                   Group Total Time for Session 23-37 Minutes     Sets Reps Load Comment    Nustep  x 1              Calf Stretch x  3     Longsit Hamstring x  3     Sciatic Glide          Quadratus Lumborum Stretch  x  5     S/L Thoracic Rotation  x  5     U/L Iso Hip Flexion   10     B/L Iso Hip Flexion    10  Feet on PB    Crunch Hip Add   15     90/90 Hip Add                Step Up Forward     Prevent lumbar extension    Bandslide                      Seated Lumbar Flexion  x Completed through session for symptom management PRN         Neuro Re-Ed  CPT 36240   Group Total Time for Session Not performed     Sets Reps Load Comment                                     Gait  CPT 05818 Group Total Time for Session Not performed                  Ther. Activity  CPT 09115 Group Total Time for Session Not performed   Patient Education  Education completed for anatomy using Essential Anatomy Application, symptom management with lumbar flexion, home programming, and goal setting.         Group  CPT 45294 Total Time for Session 23-37 Minutes

## 2024-08-20 ENCOUNTER — HOSPITAL ENCOUNTER (OUTPATIENT)
Dept: PHYSICAL THERAPY | Age: 69
Setting detail: THERAPIES SERIES
Discharge: HOME | End: 2024-08-20
Attending: PHYSICAL MEDICINE & REHABILITATION
Payer: MEDICARE

## 2024-08-20 DIAGNOSIS — M54.16 RADICULOPATHY, LUMBAR REGION: Primary | ICD-10-CM

## 2024-08-20 PROCEDURE — 97150 GROUP THERAPEUTIC PROCEDURES: CPT | Mod: GP

## 2024-08-20 PROCEDURE — 97110 THERAPEUTIC EXERCISES: CPT | Mod: GP,59

## 2024-08-20 NOTE — PROGRESS NOTES
Physical Therapy Visit    PT DAILY NOTE FOR OUTPATIENT THERAPY    Patient: Nadeem Morales MRN: [id number]  : 1955 68 y.o.  Referring Physician: Wilton Hobbs DO  Date of Visit: 2024    Certification Dates:   through      Diagnosis:   1. Radiculopathy, lumbar region        Chief Complaints:  Low back with right LE radiculopathy limiting daily activity    Precautions:   Existing Precautions/Restrictions: no known precautions/restrictions      TODAY'S VISIT    Time In Session:  Start Time: 1401  Stop Time: 1459  Time Calculation (min): 58 min   History/Vitals/Pain/Encounter Info - 24 1355          Injury History/Precautions/Daily Required Info    Document Type daily treatment     Primary Therapist Cirilo Woodson     Chief Complaint/Reason for Visit  Low back with right LE radiculopathy limiting daily activity     Referring Physician Wilton Joyner     Existing Precautions/Restrictions no known precautions/restrictions     History of present illness/functional impairment I have been having some neuropathy into my left leg, all of a sudden I have had sciatica and if I stand for any period of time my left thigh burns.  IT burns so bad that I have to sit down due to the pain. 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  I would prefer therapy or something that can make me feel better not just a shot.  When I walk around my neighborhood about 1.2 miles I start to feel it in my left thigh.  Pain is releived with sitting and only goes to my knee.  It really started about 6 months ago and I was getting cramps into my left leg down to my knee.  Currently retired and used to work as a .     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  I did have pain one day when I was standing for a long time.     Patient  reported fall since last visit No        Pain Assessment    Currently in pain No/Denies     Pain: Body location Leg;Buttock     Pain Rating (0-10): Pre Activity 0     Pain Rating (0-10): Activity 5     Pain Rating (0-10): Post Activity 0        Pain Intervention    Intervention  Education and exercise     Post Intervention Comments See assessment                    Daily Treatment Assessment and Plan - 08/20/24 1355          Daily Treatment Assessment and Plan    Progress toward goals Progressing     Daily Outcome Summary Fidel reports decreased frequency of pain into right LE, noted with prolongged standing with education completed for symptom managment using lumbar flexion for opening.  Encouraged increasd completion or lumbar flexion and nerve glides daily if he is not able to complete other home exercises.  Improved breath control this session, cues required to not overwork with exercise progressions.     Plan and Recommendations Continue with flexion based program                         OBJECTIVE DATA TAKEN TODAY:    None taken    Today's Treatment:    IE 7/29/24   30 Day 8/29/24   60 Day 9/27/24   Treatment  Current Session Time   Modalities Total Time for Session Not performed   Heat/Ice  CPT 77514    E. Stimulation Manual  CPT 74075    E. Stim Unattended  CPT 67328    Ultrasound  CPT 41177    Manual   CPT 11690 Total Time for Session 0-7 Minutes   STM/MFR/TPR    Instrument Assisted STM     Mobilizations    ROM/Flexibility Jim Stretch end of session   Prone Quad Stretch Not completed 8/20   Myofascial Decompression    Ther. Exercise  CPT 32692                   Group Total Time for Session 23-37 Minutes     Sets Reps Load Comment    Nustep  x 1  3 7 min            Calf Stretch x  3     Longsit Hamstring x  3     Sciatic Glide  X 2 10      Quadratus Lumborum Stretch  x  3  5 breaths    S/L Thoracic Rotation      5 breaths    U/L Iso Hip Flexion  2 10  Ipsilateral and contralateral press.    B/L Iso Hip  Flexion    15  Feet on PB, hip add   Crunch Hip Add   15  Hip Add    Isometric Abdominal Dead Bug    10  Green PB            Step Up Forward     Prevent lumbar extension    Bandslide                      Seated Lumbar Flexion  x Completed through session for symptom management PRN         Neuro Re-Ed  CPT 45583   Group Total Time for Session Not performed     Sets Reps Load Comment                                     Gait  CPT 10387 Group Total Time for Session Not performed                  Ther. Activity  CPT 15896 Group Total Time for Session Not performed   Patient Education  Education completed for anatomy using Essential Anatomy Application, symptom management with lumbar flexion, home programming, and goal setting.         Group  CPT 14875 Total Time for Session 23-37 Minutes

## 2024-08-20 NOTE — OP PT TREATMENT LOG
IE 7/29/24   30 Day 8/29/24   60 Day 9/27/24   Treatment  Current Session Time   Modalities Total Time for Session Not performed   Heat/Ice  CPT 88552    E. Stimulation Manual  CPT 11137    E. Stim Unattended  CPT 13909    Ultrasound  CPT 80588    Manual   CPT 72691 Total Time for Session 0-7 Minutes   STM/MFR/TPR    Instrument Assisted STM     Mobilizations    ROM/Flexibility Jim Stretch end of session   Prone Quad Stretch Not completed 8/20   Myofascial Decompression    Ther. Exercise  CPT 57636                   Group Total Time for Session 23-37 Minutes     Sets Reps Load Comment    Nustep  x 1  3 7 min            Calf Stretch x  3     Longsit Hamstring x  3     Sciatic Glide  X 2 10      Quadratus Lumborum Stretch  x  3  5 breaths    S/L Thoracic Rotation      5 breaths    U/L Iso Hip Flexion  2 10  Ipsilateral and contralateral press.    B/L Iso Hip Flexion    15  Feet on PB, hip add   Crunch Hip Add   15  Hip Add    Isometric Abdominal Dead Bug    10  Green PB            Step Up Forward     Prevent lumbar extension    Bandslide                      Seated Lumbar Flexion  x Completed through session for symptom management PRN         Neuro Re-Ed  CPT 53932   Group Total Time for Session Not performed     Sets Reps Load Comment                                     Gait  CPT 99809 Group Total Time for Session Not performed                  Ther. Activity  CPT 57974 Group Total Time for Session Not performed   Patient Education  Education completed for anatomy using Essential Anatomy Application, symptom management with lumbar flexion, home programming, and goal setting.         Group  CPT 15419 Total Time for Session 23-37 Minutes

## 2024-08-22 ENCOUNTER — HOSPITAL ENCOUNTER (OUTPATIENT)
Dept: PHYSICAL THERAPY | Age: 69
Setting detail: THERAPIES SERIES
Discharge: HOME | End: 2024-08-22
Attending: PHYSICAL MEDICINE & REHABILITATION
Payer: MEDICARE

## 2024-08-22 DIAGNOSIS — M54.16 RADICULOPATHY, LUMBAR REGION: Primary | ICD-10-CM

## 2024-08-22 PROCEDURE — 97150 GROUP THERAPEUTIC PROCEDURES: CPT | Mod: GP

## 2024-08-22 PROCEDURE — 97110 THERAPEUTIC EXERCISES: CPT | Mod: GP,59

## 2024-08-22 PROCEDURE — 97140 MANUAL THERAPY 1/> REGIONS: CPT | Mod: GP,59

## 2024-08-22 NOTE — PROGRESS NOTES
Physical Therapy Visit    PT DAILY NOTE FOR OUTPATIENT THERAPY    Patient: Nadeem Morales MRN: [id number]  : 1955 68 y.o.  Referring Physician: Wilton Hobbs DO  Date of Visit: 2024    Certification Dates: 24 through 24    Diagnosis:   1. Radiculopathy, lumbar region        Chief Complaints:  Low back with right LE radiculopathy limiting daily activity    Precautions:   Existing Precautions/Restrictions: no known precautions/restrictions      TODAY'S VISIT    Time In Session:  Start Time: 1003  Stop Time: 1105  Time Calculation (min): 62 min   History/Vitals/Pain/Encounter Info - 24 1118          Injury History/Precautions/Daily Required Info    Document Type daily treatment     Primary Therapist Cirilo Woodson     Chief Complaint/Reason for Visit  Low back with right LE radiculopathy limiting daily activity     Referring Physician Wilton Joyner     Existing Precautions/Restrictions no known precautions/restrictions     History of present illness/functional impairment I have been having some neuropathy into my left leg, all of a sudden I have had sciatica and if I stand for any period of time my left thigh burns.  IT burns so bad that I have to sit down due to the pain. 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  I would prefer therapy or something that can make me feel better not just a shot.  When I walk around my neighborhood about 1.2 miles I start to feel it in my left thigh.  Pain is releived with sitting and only goes to my knee.  It really started about 6 months ago and I was getting cramps into my left leg down to my knee.  Currently retired and used to work as a .     Patient/Family/Caregiver Comments/Observations I felt great after last session, doing the stretching really helps but I still feel the tingling in my right high at times.     Patient reported fall since last visit No         Pain Assessment    Currently in pain No/Denies        Pain Intervention    Intervention  Education and manual     Post Intervention Comments See assessment                    Daily Treatment Assessment and Plan - 08/22/24 1118          Daily Treatment Assessment and Plan    Progress toward goals Progressing     Daily Outcome Summary Fidel continues to progress hip and lumbar mobility,  he remains challenged with breath control during core stability progressions.  Fidel reports left sciaitc tension with Jim stretch and left hamstring included, improved tolerance when completed with hip flexion > hamstring stretch, continue next session with hip flexion.  Prone quad stretch completed in neutral and with hip extended.     Plan and Recommendations Continue with hip mobility and core strength- update HEP next session to include current programming.                         OBJECTIVE DATA TAKEN TODAY:    None taken    Today's Treatment:    IE 7/29/24   30 Day 8/29/24   60 Day 9/27/24   Treatment  Current Session Time   Modalities Total Time for Session Not performed   Heat/Ice  CPT 26860    E. Stimulation Manual  CPT 09314    E. Stim Unattended  CPT 20187    Ultrasound  CPT 47799    Manual   CPT 85857 Total Time for Session 8-22 Minutes   STM/MFR/TPR    Instrument Assisted STM     Mobilizations    ROM/Flexibility Jim Stretch end of session   Prone Quad Stretch    Myofascial Decompression    Ther. Exercise  CPT 76108                   Group Total Time for Session 8-22 Minutes     Sets Reps Load Comment    Nustep  x 1  3 7 min            Calf Stretch x  3     Longsit Hamstring x  3     Sciatic Glide  x 2 10      Quadratus Lumborum Stretch  x  3  5 breaths    S/L Thoracic Rotation      5 breaths    U/L Iso Hip Flexion  2 10  Ipsilateral and contralateral press.    B/L Iso Hip Flexion    15  Feet on PB, hip add   Crunch Hip Add   15  Hip Add    Isometric Abdominal Dead Bug    10  Green PB            Step Up Forward      Prevent lumbar extension    Hip Flexion   2 10 Green     Hip Abduction   2 10 Green          Seated Lumbar Flexion  x Completed through session for symptom management PRN         Neuro Re-Ed  CPT 17224   Group Total Time for Session Not performed     Sets Reps Load Comment                                     Gait  CPT 75316 Group Total Time for Session Not performed                  Ther. Activity  CPT 24738 Group Total Time for Session Not performed   Patient Education  Education completed for anatomy using Essential Anatomy Application, symptom management with lumbar flexion, home programming, and goal setting.         Group  CPT 65042 Total Time for Session 23-37 Minutes

## 2024-08-27 ENCOUNTER — HOSPITAL ENCOUNTER (OUTPATIENT)
Dept: PHYSICAL THERAPY | Age: 69
Setting detail: THERAPIES SERIES
Discharge: HOME | End: 2024-08-27
Attending: PHYSICAL MEDICINE & REHABILITATION
Payer: MEDICARE

## 2024-08-27 DIAGNOSIS — M54.16 RADICULOPATHY, LUMBAR REGION: Primary | ICD-10-CM

## 2024-08-27 PROCEDURE — 97110 THERAPEUTIC EXERCISES: CPT | Mod: GP,CQ

## 2024-08-27 NOTE — PROGRESS NOTES
Physical Therapy Visit    PT DAILY NOTE FOR OUTPATIENT THERAPY    Patient: Nadeem Morales MRN: [id number]  : 1955 68 y.o.  Referring Physician: Wilton Hobbs DO  Date of Visit: 2024    Certification Dates: 24 through 24    Diagnosis:   1. Radiculopathy, lumbar region        Chief Complaints:  Low back with right LE radiculopathy limiting daily activity    TODAY'S VISIT    Time In Session:  Start Time: 1103  Stop Time: 1200  Time Calculation (min): 57 min   History/Vitals/Pain/Encounter Info - 24 1101          Injury History/Precautions/Daily Required Info    Document Type daily treatment     Primary Therapist Cirilo Woodson     Chief Complaint/Reason for Visit  Low back with right LE radiculopathy limiting daily activity     Referring Physician Wilton Joyner     Existing Precautions/Restrictions no known precautions/restrictions     History of present illness/functional impairment I have been having some neuropathy into my left leg, all of a sudden I have had sciatica and if I stand for any period of time my left thigh burns.  IT burns so bad that I have to sit down due to the pain. 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  I would prefer therapy or something that can make me feel better not just a shot.  When I walk around my neighborhood about 1.2 miles I start to feel it in my left thigh.  Pain is releived with sitting and only goes to my knee.  It really started about 6 months ago and I was getting cramps into my left leg down to my knee.  Currently retired and used to work as a .     Patient/Family/Caregiver Comments/Observations I am feeling ok today. I think I messed up my knee a little bit while doing my exercises. I just notice some tenderness L Knee. I just left the orthopedic and Lindy decided that i'm going to get an injeciton. I dont have it scheduled till about 3 weeks from now,  but they think it will really help me.     Patient reported fall since last visit No        Pain Assessment    Currently in pain Yes     Preferred Pain Scale number (Numeric Rating Pain Scale)     Pain: Body location Leg;Back;Buttock     Pain Rating (0-10): Pre Activity 3     Pain Rating (0-10): Activity 3     Pain Rating (0-10): Post Activity 3        Pain Intervention    Intervention  TE     Post Intervention Comments See Assessment                    Daily Treatment Assessment and Plan - 08/27/24 1101          Daily Treatment Assessment and Plan    Progress toward goals Progressing     Daily Outcome Summary Nadeem arrived to PT this session after going to see his orthopedist. Patient decided he is going to get an injection that is scheduled for about 3 weeks from now. Will inform PT RA. Continued with exercises to assist in improving hip mobility and strength. Patient tolerated all well and would benefit from continued PT.     Plan and Recommendations Continue with hip mobility and core strength- update HEP next session to include current programming.                     Today's Treatment:    IE 7/29/24   30 Day 8/29/24   60 Day 9/27/24   Treatment  Current Session Time   Modalities Total Time for Session Not performed   Heat/Ice  CPT 08720    E. Stimulation Manual  CPT 18788    E. Stim Unattended  CPT 28101    Ultrasound  CPT 08736    Manual   CPT 60674 Total Time for Session Not performed   STM/MFR/TPR    Instrument Assisted STM     Mobilizations    ROM/Flexibility Jim Stretch end of session   Prone Quad Stretch    Myofascial Decompression    Ther. Exercise  CPT 58026                   Grp Total Time for Session 53-67 Minutes     Sets Reps Load Comment    Nustep   1  3 7 min    PROM Stretching Completed by Therapist     Jim Harvey  Prone Quad Stretch   Calf Stretch   3     Longsit Hamstring   3     Sciatic Glide   2 10      Quadratus Lumborum Stretch    3  5 breaths    S/L Thoracic Rotation      5  breaths    U/L Iso Hip Flexion  2 10  Ipsilateral and contralateral press.    B/L Iso Hip Flexion    15  Feet on PB, hip add   Crunch Hip Add   15  Hip Add    Isometric Abdominal Dead Bug    10  Green PB            Step Up Forward     Prevent lumbar extension    Hip Flexion   2 10 Green     Hip Abduction   2 10 Green          Seated Lumbar Flexion   Completed through session for symptom management PRN         Neuro Re-Ed  CPT 97487   Group Total Time for Session Not performed     Sets Reps Load Comment                                     Gait  CPT 24576 Group Total Time for Session Not performed                  Ther. Activity  CPT 42994 Group Total Time for Session Not performed   Patient Education  Education completed for anatomy using Essential Anatomy Application, symptom management with lumbar flexion, home programming, and goal setting.         Group  CPT 62858 Total Time for Session Not performed

## 2024-08-27 NOTE — OP PT TREATMENT LOG
IE 7/29/24   30 Day 8/29/24   60 Day 9/27/24   Treatment  Current Session Time   Modalities Total Time for Session Not performed   Heat/Ice  CPT 00451    E. Stimulation Manual  CPT 49253    E. Stim Unattended  CPT 65302    Ultrasound  CPT 05066    Manual   CPT 15913 Total Time for Session Not performed   STM/MFR/TPR    Instrument Assisted STM     Mobilizations    ROM/Flexibility Jim Stretch end of session   Prone Quad Stretch    Myofascial Decompression    Ther. Exercise  CPT 28868                   Grp Total Time for Session 53-67 Minutes     Sets Reps Load Comment    Nustep   1  3 7 min    PROM Stretching Completed by Therapist     Jim Stretch  Prone Quad Stretch   Calf Stretch   3     Longsit Hamstring   3     Sciatic Glide   2 10      Quadratus Lumborum Stretch    3  5 breaths    S/L Thoracic Rotation      5 breaths    U/L Iso Hip Flexion  2 10  Ipsilateral and contralateral press.    B/L Iso Hip Flexion    15  Feet on PB, hip add   Crunch Hip Add   15  Hip Add    Isometric Abdominal Dead Bug    10  Green PB            Step Up Forward     Prevent lumbar extension    Hip Flexion   2 10 Green     Hip Abduction   2 10 Green          Seated Lumbar Flexion   Completed through session for symptom management PRN         Neuro Re-Ed  CPT 68624   Group Total Time for Session Not performed     Sets Reps Load Comment                                     Gait  CPT 26478 Group Total Time for Session Not performed                  Ther. Activity  CPT 29431 Group Total Time for Session Not performed   Patient Education  Education completed for anatomy using Essential Anatomy Application, symptom management with lumbar flexion, home programming, and goal setting.         Group  CPT 11104 Total Time for Session Not performed

## 2024-08-29 ENCOUNTER — HOSPITAL ENCOUNTER (OUTPATIENT)
Dept: PHYSICAL THERAPY | Age: 69
Setting detail: THERAPIES SERIES
Discharge: HOME | End: 2024-08-29
Attending: PHYSICAL MEDICINE & REHABILITATION
Payer: MEDICARE

## 2024-08-29 DIAGNOSIS — M54.16 RADICULOPATHY, LUMBAR REGION: Primary | ICD-10-CM

## 2024-08-29 PROCEDURE — 97110 THERAPEUTIC EXERCISES: CPT | Mod: GP

## 2024-08-29 PROCEDURE — 97530 THERAPEUTIC ACTIVITIES: CPT | Mod: GP

## 2024-08-29 NOTE — PROGRESS NOTES
Physical Therapy Discharge      PT DISCHARGE NOTE FOR OUTPATIENT THERAPY    Patient: Nadeem Morales MRN: [id number]  : 1955 68 y.o.  Referring Physician: Wilton Hobbs DO  Date of Visit: 2024      Certification Dates: 24 through 24    Total Visit Count: 6    Diagnosis:   1. Radiculopathy, lumbar region        Chief Complaints:  Chief Complaint   Patient presents with    Dec ROM    Dec Strength    Decreased Mobility    Pain       Precautions:  Existing Precautions/Restrictions: no known precautions/restrictions      TODAY'S VISIT:    Time In Session:  Start Time: 1000  Stop Time: 1054  Time Calculation (min): 54 min   General Information - 24 0955          Session Details    Document Type discharge evaluation     Mode of Treatment individual therapy        General Information    Referring Physician Wilton Joyner     History of present illness/functional impairment I have been having some neuropathy into my left leg, all of a sudden I have had sciatica and if I stand for any period of time my left thigh burns.  IT burns so bad that I have to sit down due to the pain. 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  I would prefer therapy or something that can make me feel better not just a shot.  When I walk around my neighborhood about 1.2 miles I start to feel it in my left thigh.  Pain is releived with sitting and only goes to my knee.  It really started about 6 months ago and I was getting cramps into my left leg down to my knee.  Currently retired and used to work as a .     Patient/Family/Caregiver Comments/Observations I am going to get the injection in 3 weeks.I am going to do the exercises as much as I can when I am at home.     Existing Precautions/Restrictions no known precautions/restrictions                    Daily Falls Screen - 24 0955          Daily Falls Assessment     Patient reported fall since last visit No                    Pain/Vitals - 08/29/24 0933          Pain Assessment    Currently in pain Yes     Preferred Pain Scale number (Numeric Rating Pain Scale)     Pain Rating (0-10): Pre Activity 0     Pain Rating (0-10): Activity 3     Pain Rating (0-10): Post Activity 0        Pain Intervention    Intervention  Education     Post Intervention Comments See assessment                    PT - 08/29/24 0964          Physical Therapy    Physical Therapy [facility] PT        PT Plan    Frequency of treatment 2 times/week     PT Duration 8 weeks     PT Cert From 07/29/24     PT Cert To 09/27/24     Signed PT Plan of Care received?  Yes                    Assessment and Plan - 08/29/24 0928          Assessment    Plan of Care reviewed and patient/family in agreement Yes     Clinical Assessment Fidel reports continued left thigh pain with prolongged standing and walking that comes and goes.  He consulted with orthopedics and has scheduled an injection for 3 weeks from today.  He is independent with his home programming and symptom management strategies.  Fidel has met therapy goals and will discharge to independent management at this time.     Plan and Recommendations Discharge to home programming                     OBJECTIVE MEASUREMENTS/DATA:    ROM and MMT          7/29/2024 8/29/2024   PT LE ROM Measurements   AROM: Right LE Gross Movement  WNL   AROM: Left LE Gross Movement  WNL   AROM: Right Hip IR 30 degrees    AROM: Left Hip IR 32 degrees    AROM: Right Hip ER 35 degrees    AROM: Left Hip ER 22 degrees    PT Cervical/Lumbar/Other ROM Measurements   AROM: Lumbar Flexion 25 50   AROM: Lumar Extension 25 50   AROM: Left Lumbar SB 50 50   AROM: Right Lumbar SB 50 50   AROM: Left Lumbar Rotation 50 75   AROM: Right Lumbar Rotation 50 75   PT LE MMT   Right Hip Flexion (4+/5) good plus (4+/5) good plus   Left Hip Flexion (4+/5) good plus (4+/5) good plus   Right  Hip Extension (4+/5) good plus (4+/5) good plus   Left Hip Extension (4+/5) good plus (4+/5) good plus   Right Hip ABD (4+/5) good plus (4+/5) good plus   Left Hip ABD (4+/5) good plus (4+/5) good plus   Right Hip IR (4+/5) good plus (4+/5) good plus   Left Hip IR (4+/5) good plus (4+/5) good plus   Right Hip ER (4+/5) good plus (4+/5) good plus   Left Hip ER (4+/5) good plus (4+/5) good plus   Right Knee Flexion (5/5) normal (5/5) normal   Left Knee Flexion (5/5) normal (5/5) normal   Right Knee Extension (5/5) normal (5/5) normal   Left Knee Extension (5/5) normal (5/5) normal   Right Ankle DF (5/5) normal (5/5) normal   Left Ankle DF (5/5) normal (5/5) normal   Right Ankle PF (5/5) normal (5/5) normal   Left Ankle PF (5/5) normal (5/5) normal   Right Ankle Inversion (5/5) normal (5/5) normal   Right Ankle Eversion (5/5) normal (5/5) normal     Outcome Measures          7/29/2024    13:58 8/29/2024    09:55   PT SUBJECTIVE Outcome Measures   Oswestry 10% 16%       Today's Treatment:    Education provided:  Yes: See treatment log for details of education provided  Methods: Discussion, Handout, and Demonstration  Readiness: acceptance  Response: Demonstrated understanding    IE 7/29/24   30 Day 8/29/24   60 Day 9/27/24   Treatment  Current Session Time   Modalities Total Time for Session Not performed   Heat/Ice  CPT 17910    E. Stimulation Manual  CPT 63004    E. Stim Unattended  CPT 40556    Ultrasound  CPT 71490    Manual   CPT 30288 Total Time for Session Not performed   STM/MFR/TPR    Instrument Assisted STM     Mobilizations    ROM/Flexibility Jim Stretch end of session   Prone Quad Stretch    Myofascial Decompression    Ther. Exercise  CPT 14802                   Grp Total Time for Session 38-52 Minutes     Sets Reps Load Comment    Nustep   1  5 5 min            Calf Stretch   3     Standing Hamstring Stretch at Steps   3     Bridge Hip Add  2 15     Bridge Hip Abd  2 15     S/L Thoracic Rotation          U/L Iso Hip Flexion  2 10  Ipsilateral and contralateral press.    B/L Iso Hip Flexion    15  Feet on PB, hip add   Crunch Hip Add   20  Hip Add    Isometric Abdominal Dead Bug    10  Green PB            Step Up Forward     Prevent lumbar extension    Hip Flexion   2 10 Green     Hip Abduction   2 10 Green          Seated Lumbar Flexion   Completed through session for symptom management PRN         Neuro Re-Ed  CPT 68912   Group Total Time for Session Not performed     Sets Reps Load Comment                                     Gait  CPT 36776 Group Total Time for Session Not performed                  Ther. Activity  CPT 59193 Group Total Time for Session 8-22 Minutes   Patient Education  Education completed for anatomy using Essential Anatomy Application, symptom management with lumbar flexion, home programming, and goal setting.    Discharge Assessment   Objective and subjective assessment completed .   Group  CPT 66999 Total Time for Session Not performed           Goals Addressed                      This Visit's Progress       Patient Stated      COMPLETED: Patient Goals (pt-stated)   Improving      Decrease pain with standing and walking during daily activity.          Other      COMPLETED: Mutually agreed upon pain goal   On track      Mutually agreed upon pain goal: 2/10 with prolonged standing and walking        COMPLETED: Therapy Goals   Improving      Short Term Goals Time Frame Result Progress   Decrease pain to 2/10 with prolonged standing 30 min 4  weeks Met    Pt will increase lumbar ROM >/= 25%  into flexion, extension, lateral flexion and rotation 4 weeks Met    Pt will demonstrate improvements in Oswestry >/=  4 % functional improvement 4 weeks Not Met  Initial 10  8/29: 16%   Demonstrate good symptom management strategies with lumbar flexion program  4 weeks Met      Long Term Goals Time Frame Result Progress   Pt will demonstrate good breath control with abdominal isometric exercise  progressions  8  weeks Met    Pt will demonstrate improvements in Oswestry >/=  8 % functional improvement 8  weeks Not Met Initial 10   Go to fitness center in community and walking with good symptom management  8  weeks Met    Stand x 60 minutes to cook without pain  8  weeks Not Met

## 2024-08-29 NOTE — OP PT TREATMENT LOG
IE 7/29/24   30 Day 8/29/24   60 Day 9/27/24   Treatment  Current Session Time   Modalities Total Time for Session Not performed   Heat/Ice  CPT 89899    E. Stimulation Manual  CPT 48843    E. Stim Unattended  CPT 42329    Ultrasound  CPT 30921    Manual   CPT 79137 Total Time for Session Not performed   STM/MFR/TPR    Instrument Assisted STM     Mobilizations    ROM/Flexibility Jim Stretch end of session   Prone Quad Stretch    Myofascial Decompression    Ther. Exercise  CPT 34220                   Grp Total Time for Session 38-52 Minutes     Sets Reps Load Comment    Nustep   1  5 5 min            Calf Stretch   3     Standing Hamstring Stretch at Steps   3     Bridge Hip Add  2 15     Bridge Hip Abd  2 15     S/L Thoracic Rotation         U/L Iso Hip Flexion  2 10  Ipsilateral and contralateral press.    B/L Iso Hip Flexion    15  Feet on PB, hip add   Crunch Hip Add   20  Hip Add    Isometric Abdominal Dead Bug    10  Green PB            Step Up Forward     Prevent lumbar extension    Hip Flexion   2 10 Green     Hip Abduction   2 10 Green          Seated Lumbar Flexion   Completed through session for symptom management PRN         Neuro Re-Ed  CPT 49350   Group Total Time for Session Not performed     Sets Reps Load Comment                                     Gait  CPT 03283 Group Total Time for Session Not performed                  Ther. Activity  CPT 05041 Group Total Time for Session 8-22 Minutes   Patient Education  Education completed for anatomy using Essential Anatomy Application, symptom management with lumbar flexion, home programming, and goal setting.    Discharge Assessment   Objective and subjective assessment completed .   Group  CPT 16010 Total Time for Session Not performed

## 2025-03-31 ENCOUNTER — TRANSCRIBE ORDERS (OUTPATIENT)
Dept: SCHEDULING | Age: 70
End: 2025-03-31

## 2025-03-31 DIAGNOSIS — M25.551 PAIN IN RIGHT HIP: Primary | ICD-10-CM

## 2025-04-02 ENCOUNTER — HOSPITAL ENCOUNTER (OUTPATIENT)
Dept: RADIOLOGY | Facility: HOSPITAL | Age: 70
Discharge: HOME | End: 2025-04-02
Attending: PHYSICIAN ASSISTANT
Payer: MEDICARE

## 2025-04-02 DIAGNOSIS — M25.551 PAIN IN RIGHT HIP: ICD-10-CM

## 2025-04-02 PROCEDURE — 73721 MRI JNT OF LWR EXTRE W/O DYE: CPT | Mod: RT

## 2025-05-06 ENCOUNTER — APPOINTMENT (OUTPATIENT)
Dept: URBAN - METROPOLITAN AREA CLINIC 203 | Age: 70
Setting detail: DERMATOLOGY
End: 2025-05-08

## 2025-05-06 DIAGNOSIS — L57.8 OTHER SKIN CHANGES DUE TO CHRONIC EXPOSURE TO NONIONIZING RADIATION: ICD-10-CM

## 2025-05-06 DIAGNOSIS — L81.4 OTHER MELANIN HYPERPIGMENTATION: ICD-10-CM

## 2025-05-06 DIAGNOSIS — D22 MELANOCYTIC NEVI: ICD-10-CM

## 2025-05-06 DIAGNOSIS — Z71.89 OTHER SPECIFIED COUNSELING: ICD-10-CM

## 2025-05-06 DIAGNOSIS — L57.0 ACTINIC KERATOSIS: ICD-10-CM

## 2025-05-06 DIAGNOSIS — L82.1 OTHER SEBORRHEIC KERATOSIS: ICD-10-CM

## 2025-05-06 DIAGNOSIS — D18.0 HEMANGIOMA: ICD-10-CM

## 2025-05-06 PROBLEM — D18.01 HEMANGIOMA OF SKIN AND SUBCUTANEOUS TISSUE: Status: ACTIVE | Noted: 2025-05-06

## 2025-05-06 PROBLEM — D22.62 MELANOCYTIC NEVI OF LEFT UPPER LIMB, INCLUDING SHOULDER: Status: ACTIVE | Noted: 2025-05-06

## 2025-05-06 PROBLEM — D22.5 MELANOCYTIC NEVI OF TRUNK: Status: ACTIVE | Noted: 2025-05-06

## 2025-05-06 PROCEDURE — OTHER SUNSCREEN RECOMMENDATIONS: OTHER

## 2025-05-06 PROCEDURE — OTHER PRESCRIPTION MEDICATION MANAGEMENT: OTHER

## 2025-05-06 PROCEDURE — OTHER COUNSELING: OTHER

## 2025-05-06 PROCEDURE — 99213 OFFICE O/P EST LOW 20 MIN: CPT

## 2025-05-06 ASSESSMENT — LOCATION DETAILED DESCRIPTION DERM
LOCATION DETAILED: LEFT SUPERIOR LATERAL MALAR CHEEK
LOCATION DETAILED: RIGHT POSTERIOR SHOULDER
LOCATION DETAILED: EPIGASTRIC SKIN
LOCATION DETAILED: LEFT ANTERIOR PROXIMAL UPPER ARM
LOCATION DETAILED: RIGHT MEDIAL FRONTAL SCALP
LOCATION DETAILED: RIGHT PROXIMAL POSTERIOR THIGH
LOCATION DETAILED: LEFT PROXIMAL POSTERIOR THIGH
LOCATION DETAILED: RIGHT MEDIAL FOREHEAD
LOCATION DETAILED: LEFT MEDIAL UPPER BACK
LOCATION DETAILED: LEFT POSTERIOR SHOULDER
LOCATION DETAILED: SUPERIOR MID FOREHEAD
LOCATION DETAILED: LEFT MEDIAL INFERIOR CHEST

## 2025-05-06 ASSESSMENT — LOCATION ZONE DERM
LOCATION ZONE: ARM
LOCATION ZONE: LEG
LOCATION ZONE: SCALP
LOCATION ZONE: FACE
LOCATION ZONE: TRUNK

## 2025-05-06 ASSESSMENT — LOCATION SIMPLE DESCRIPTION DERM
LOCATION SIMPLE: LEFT SHOULDER
LOCATION SIMPLE: LEFT POSTERIOR THIGH
LOCATION SIMPLE: ABDOMEN
LOCATION SIMPLE: CHEST
LOCATION SIMPLE: RIGHT SCALP
LOCATION SIMPLE: RIGHT POSTERIOR THIGH
LOCATION SIMPLE: RIGHT SHOULDER
LOCATION SIMPLE: RIGHT FOREHEAD
LOCATION SIMPLE: SUPERIOR FOREHEAD
LOCATION SIMPLE: LEFT UPPER BACK
LOCATION SIMPLE: LEFT CHEEK
LOCATION SIMPLE: LEFT UPPER ARM